# Patient Record
Sex: MALE | Race: WHITE | NOT HISPANIC OR LATINO | Employment: STUDENT | URBAN - METROPOLITAN AREA
[De-identification: names, ages, dates, MRNs, and addresses within clinical notes are randomized per-mention and may not be internally consistent; named-entity substitution may affect disease eponyms.]

---
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
## 2021-04-11 ENCOUNTER — HOSPITAL ENCOUNTER (EMERGENCY)
Facility: HOSPITAL | Age: 21
End: 2021-04-13
Attending: EMERGENCY MEDICINE | Admitting: EMERGENCY MEDICINE
Payer: COMMERCIAL

## 2021-04-11 DIAGNOSIS — F32.A DEPRESSION: Primary | ICD-10-CM

## 2021-04-11 LAB
ALBUMIN SERPL BCP-MCNC: 4.5 G/DL (ref 3.5–5)
ALP SERPL-CCNC: 91 U/L (ref 46–484)
ALT SERPL W P-5'-P-CCNC: 47 U/L (ref 12–78)
AMPHETAMINES SERPL QL SCN: POSITIVE
ANION GAP SERPL CALCULATED.3IONS-SCNC: 8 MMOL/L (ref 4–13)
AST SERPL W P-5'-P-CCNC: 22 U/L (ref 5–45)
BARBITURATES UR QL: NEGATIVE
BASOPHILS # BLD AUTO: 0.04 THOUSANDS/ΜL (ref 0–0.1)
BASOPHILS NFR BLD AUTO: 0 % (ref 0–1)
BENZODIAZ UR QL: NEGATIVE
BILIRUB SERPL-MCNC: 1.77 MG/DL (ref 0.2–1)
BILIRUB UR QL STRIP: ABNORMAL
BUN SERPL-MCNC: 18 MG/DL (ref 5–25)
CALCIUM SERPL-MCNC: 9.3 MG/DL (ref 8.3–10.1)
CHLORIDE SERPL-SCNC: 102 MMOL/L (ref 100–108)
CLARITY UR: CLEAR
CO2 SERPL-SCNC: 27 MMOL/L (ref 21–32)
COCAINE UR QL: NEGATIVE
COLOR UR: YELLOW
CREAT SERPL-MCNC: 1.05 MG/DL (ref 0.6–1.3)
EOSINOPHIL # BLD AUTO: 0.47 THOUSAND/ΜL (ref 0–0.61)
EOSINOPHIL NFR BLD AUTO: 4 % (ref 0–6)
ERYTHROCYTE [DISTWIDTH] IN BLOOD BY AUTOMATED COUNT: 11.9 % (ref 11.6–15.1)
ETHANOL SERPL-MCNC: <3 MG/DL (ref 0–3)
FLUAV RNA RESP QL NAA+PROBE: NEGATIVE
FLUBV RNA RESP QL NAA+PROBE: NEGATIVE
GFR SERPL CREATININE-BSD FRML MDRD: 102 ML/MIN/1.73SQ M
GLUCOSE SERPL-MCNC: 80 MG/DL (ref 65–140)
GLUCOSE UR STRIP-MCNC: NEGATIVE MG/DL
HCT VFR BLD AUTO: 45.5 % (ref 36.5–49.3)
HGB BLD-MCNC: 16.1 G/DL (ref 12–17)
HGB UR QL STRIP.AUTO: NEGATIVE
IMM GRANULOCYTES # BLD AUTO: 0.04 THOUSAND/UL (ref 0–0.2)
IMM GRANULOCYTES NFR BLD AUTO: 0 % (ref 0–2)
KETONES UR STRIP-MCNC: ABNORMAL MG/DL
LEUKOCYTE ESTERASE UR QL STRIP: NEGATIVE
LYMPHOCYTES # BLD AUTO: 2.69 THOUSANDS/ΜL (ref 0.6–4.47)
LYMPHOCYTES NFR BLD AUTO: 24 % (ref 14–44)
MCH RBC QN AUTO: 32 PG (ref 26.8–34.3)
MCHC RBC AUTO-ENTMCNC: 35.4 G/DL (ref 31.4–37.4)
MCV RBC AUTO: 91 FL (ref 82–98)
METHADONE UR QL: NEGATIVE
MONOCYTES # BLD AUTO: 0.74 THOUSAND/ΜL (ref 0.17–1.22)
MONOCYTES NFR BLD AUTO: 7 % (ref 4–12)
NEUTROPHILS # BLD AUTO: 7.34 THOUSANDS/ΜL (ref 1.85–7.62)
NEUTS SEG NFR BLD AUTO: 65 % (ref 43–75)
NITRITE UR QL STRIP: NEGATIVE
NRBC BLD AUTO-RTO: 0 /100 WBCS
OPIATES UR QL SCN: NEGATIVE
OXYCODONE+OXYMORPHONE UR QL SCN: NEGATIVE
PCP UR QL: NEGATIVE
PH UR STRIP.AUTO: 6 [PH]
PLATELET # BLD AUTO: 269 THOUSANDS/UL (ref 149–390)
PMV BLD AUTO: 8.9 FL (ref 8.9–12.7)
POTASSIUM SERPL-SCNC: 3.6 MMOL/L (ref 3.5–5.3)
PROT SERPL-MCNC: 7.5 G/DL (ref 6.4–8.2)
PROT UR STRIP-MCNC: NEGATIVE MG/DL
RBC # BLD AUTO: 5.03 MILLION/UL (ref 3.88–5.62)
RSV RNA RESP QL NAA+PROBE: NEGATIVE
SARS-COV-2 RNA RESP QL NAA+PROBE: NEGATIVE
SODIUM SERPL-SCNC: 137 MMOL/L (ref 136–145)
SP GR UR STRIP.AUTO: >=1.03 (ref 1–1.03)
THC UR QL: POSITIVE
UROBILINOGEN UR QL STRIP.AUTO: 0.2 E.U./DL
WBC # BLD AUTO: 11.32 THOUSAND/UL (ref 4.31–10.16)

## 2021-04-11 PROCEDURE — 85025 COMPLETE CBC W/AUTO DIFF WBC: CPT | Performed by: EMERGENCY MEDICINE

## 2021-04-11 PROCEDURE — 81003 URINALYSIS AUTO W/O SCOPE: CPT | Performed by: EMERGENCY MEDICINE

## 2021-04-11 PROCEDURE — 93005 ELECTROCARDIOGRAM TRACING: CPT

## 2021-04-11 PROCEDURE — 80053 COMPREHEN METABOLIC PANEL: CPT | Performed by: EMERGENCY MEDICINE

## 2021-04-11 PROCEDURE — 99285 EMERGENCY DEPT VISIT HI MDM: CPT | Performed by: EMERGENCY MEDICINE

## 2021-04-11 PROCEDURE — 99285 EMERGENCY DEPT VISIT HI MDM: CPT

## 2021-04-11 PROCEDURE — 82077 ASSAY SPEC XCP UR&BREATH IA: CPT | Performed by: EMERGENCY MEDICINE

## 2021-04-11 PROCEDURE — 36415 COLL VENOUS BLD VENIPUNCTURE: CPT | Performed by: EMERGENCY MEDICINE

## 2021-04-11 PROCEDURE — 0241U HB NFCT DS VIR RESP RNA 4 TRGT: CPT | Performed by: EMERGENCY MEDICINE

## 2021-04-11 PROCEDURE — 80307 DRUG TEST PRSMV CHEM ANLYZR: CPT | Performed by: EMERGENCY MEDICINE

## 2021-04-11 RX ORDER — DEXTROAMPHETAMINE SACCHARATE, AMPHETAMINE ASPARTATE MONOHYDRATE, DEXTROAMPHETAMINE SULFATE AND AMPHETAMINE SULFATE 7.5; 7.5; 7.5; 7.5 MG/1; MG/1; MG/1; MG/1
30 CAPSULE, EXTENDED RELEASE ORAL EVERY MORNING
Status: DISCONTINUED | OUTPATIENT
Start: 2021-04-12 | End: 2021-04-13 | Stop reason: HOSPADM

## 2021-04-11 RX ORDER — ARIPIPRAZOLE 2 MG/1
2 TABLET ORAL
Status: DISCONTINUED | OUTPATIENT
Start: 2021-04-11 | End: 2021-04-13 | Stop reason: HOSPADM

## 2021-04-11 RX ORDER — FLUVOXAMINE MALEATE 50 MG/1
50 TABLET, COATED ORAL
Status: DISCONTINUED | OUTPATIENT
Start: 2021-04-11 | End: 2021-04-13 | Stop reason: HOSPADM

## 2021-04-11 RX ADMIN — ARIPIPRAZOLE 2 MG: 2 TABLET ORAL at 21:49

## 2021-04-11 RX ADMIN — FLUVOXAMINE MALEATE 50 MG: 50 TABLET ORAL at 21:49
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
## 2021-04-11 NOTE — ED NOTES
Roberto Nguyen came with police  Requested to have all lab work done  Pt to be screened and telepsych      Lori Lee

## 2021-04-11 NOTE — ED NOTES
Received report from previous nurse  Patient to be medically cleared then screened by crisis or FG  Patient denied SI/HI at arrival   Patient currently in bed, no distress noted  Will continue to monitor  Continual observation maintained  a     Chico Murphy RN  04/11/21 1930

## 2021-04-12 LAB
ATRIAL RATE: 63 BPM
P AXIS: 14 DEGREES
PR INTERVAL: 136 MS
QRS AXIS: 49 DEGREES
QRSD INTERVAL: 90 MS
QT INTERVAL: 362 MS
QTC INTERVAL: 370 MS
T WAVE AXIS: 26 DEGREES
VENTRICULAR RATE: 63 BPM

## 2021-04-12 PROCEDURE — 93010 ELECTROCARDIOGRAM REPORT: CPT | Performed by: INTERNAL MEDICINE

## 2021-04-12 RX ORDER — LANOLIN ALCOHOL/MO/W.PET/CERES
3 CREAM (GRAM) TOPICAL
Status: DISCONTINUED | OUTPATIENT
Start: 2021-04-12 | End: 2021-04-13 | Stop reason: HOSPADM

## 2021-04-12 RX ADMIN — ARIPIPRAZOLE 2 MG: 2 TABLET ORAL at 23:15

## 2021-04-12 RX ADMIN — MELATONIN TAB 3 MG 3 MG: 3 TAB at 23:15

## 2021-04-12 NOTE — ED NOTES
MARGARITA faxed all the lab to [facility] except UDS  Charge nurse advised to fax UDS when available and then request for screening  PES explained the screening process to pt and he verbalized understanding       Reji Blackman

## 2021-04-12 NOTE — ED NOTES
Pt reports adderall only taken when pt needs it to focus, "like at school"    Pt reports not taking zoloft" for a long time now"     Jyothi Powers, DANY  04/12/21 3761

## 2021-04-12 NOTE — ED NOTES
Patient observed sleeping and repositioning self without difficulty  Will continue to monitor       Earl Pacheco RN  04/12/21 3243

## 2021-04-12 NOTE — ED NOTES
Patient resting comfortably  No distress noted at this time  Continual observation maintained       Chico Murphy RN  04/12/21 9701

## 2021-04-12 NOTE — ED NOTES
[place]mo covid form faxed to [facility] @ 14:30 - original placed on chart  PC; screening document and [facility] assessment all received for Epic and placed on the chart  [facility] notified PES patient was accepted @ [facility] about 15:45 by Dr Jovan Sprague; Rn erport to call [phone] [facility])  Mustapha Nuñez / Claudene Hopes called for transport about 15:50 - they will call back with a time  Called [facility] @ 20:30 - they are still working on it

## 2021-04-12 NOTE — ED NOTES
Pt noted tearful at times  Anxious, walking in room  No distress noted        Ezio Concepcion RN  04/12/21 3859

## 2021-04-12 NOTE — ED NOTES
Pt resting quietly  No distress noted  Resp easy and unlabored         Santana Baron RN  04/12/21 1958

## 2021-04-12 NOTE — ED NOTES
As per FG, patient to be tele-psyched  Will continue to monitor       Rex Alvarado, DANY  04/11/21 0029

## 2021-04-12 NOTE — ED NOTES
Pt resting quietly  No distress noted  Pharmacy cvalled for Adderall med  Pharmacy does not carry XR of med    Will notify MD Amber Salmeron, RN  04/12/21 5779

## 2021-04-12 NOTE — ED NOTES
Pt noted standing in room, talking on phone, tearful with person on phone at times         Noa Hooks, DANY  04/12/21 1300

## 2021-04-12 NOTE — EMTALA/ACUTE CARE TRANSFER
[address] [address]  Dept: [phone]      EMTALA TRANSFER CONSENT    NAME Fredis Steele                                         2000                              MRN [id number]    I have been informed of my rights regarding examination, treatment, and transfer   by Dr Josiah Frias DO    Benefits: Specialized equipment and/or services available at the receiving facility (Include comment)________________________    Risks: Potential for delay in receiving treatment, Potential deterioration of medical condition, Increased discomfort during transfer, Possible worsening of condition or death during transfer      Consent for Transfer:  I acknowledge that my medical condition has been evaluated and explained to me by the emergency department physician or other qualified medical person and/or my attending physician, who has recommended that I be transferred to the service of  Accepting Physician: Dr Nikolai Lundberg at [facility] Name, [address] : [place]  The above potential benefits of such transfer, the potential risks associated with such transfer, and the probable risks of not being transferred have been explained to me, and I fully understand them  The doctor has explained that, in my case, the benefits of transfer outweigh the risks  I agree to be transferred  I authorize the performance of emergency medical procedures and treatments upon me in both transit and upon arrival at the receiving facility  Additionally, I authorize the release of any and all medical records to the receiving facility and request they be transported with me, if possible  I understand that the safest mode of transportation during a medical emergency is an ambulance and that the Hospital advocates the use of this mode of transport   Risks of traveling to the receiving facility by car, including absence of medical control, life sustaining equipment, such as oxygen, and medical personnel has been explained to me and I fully understand them  (LISANDRA CORRECT BOX BELOW)  [  ]  I consent to the stated transfer and to be transported by ambulance/helicopter  [  ]  I consent to the stated transfer, but refuse transportation by ambulance and accept full responsibility for my transportation by car  I understand the risks of non-ambulance transfers and I exonerate the Hospital and its staff from any deterioration in my condition that results from this refusal     X___________________________________________    DATE  21  TIME________  Signature of patient or legally responsible individual signing on patient behalf           RELATIONSHIP TO PATIENT_________________________          Provider Certification    NAME Elizabeth Lima                                         2000                              MRN [id number]    A medical screening exam was performed on the above named patient  Based on the examination:    Condition Necessitating Transfer There were no encounter diagnoses      Patient Condition: The patient has been stabilized such that within reasonable medical probability, no material deterioration of the patient condition or the condition of the unborn child(sebas) is likely to result from the transfer    Reason for Transfer: Level of Care needed not available at this facility    Transfer Requirements: [address]   · Space available and qualified personnel available for treatment as acknowledged by    · Agreed to accept transfer and to provide appropriate medical treatment as acknowledged by       Dr Joaquim Pinto  · Appropriate medical records of the examination and treatment of the patient are provided at the time of transfer   [address], [address] _______  · Transfer will be performed by qualified personnel from    and appropriate transfer equipment as required, including the use of necessary and appropriate life support measures  Provider Certification: I have examined the patient and explained the following risks and benefits of being transferred/refusing transfer to the patient/family:  General risk, such as traffic hazards, adverse weather conditions, rough terrain or turbulence, possible failure of equipment (including vehicle or aircraft), or consequences of actions of persons outside the control of the transport personnel      Based on these reasonable risks and benefits to the patient and/or the unborn child(sebas), and based upon the information available at the time of the patients examination, I certify that the medical benefits reasonably to be expected from the provision of appropriate medical treatments at another medical facility outweigh the increasing risks, if any, to the individuals medical condition, and in the case of labor to the unborn child, from effecting the transfer      X____________________________________________ DATE 04/12/21        TIME_______      ORIGINAL - SEND TO MEDICAL RECORDS   COPY - SEND WITH PATIENT DURING TRANSFER

## 2021-04-12 NOTE — ED CARE HANDOFF
Emergency Department Sign Out Note        Sign out and transfer of care from Dr Izabella Solorzano  See Separate Emergency Department note  The patient, Thomas Burn, was evaluated by the previous provider for psychiatric evaluation  ED Course as of Apr 12 1849   Mon Apr 12, 2021   6500 Patient care signed out from Dr Izabella Solorzano  Clary Lee is a  24year old M who presented with HI  Patient has been medically cleared by prior provider  Is awaiting psychiatric evaluation  1711 Patient care signed out to Dr Reynold Jean  Pending placement  Procedures  MDM    Disposition  Final diagnoses:   None     ED Disposition     ED Disposition Condition Date/Time Comment    Transfer to [facility] Apr 11, 2021  6:29 PM Thomas Burn should be transferred out to [facility] and has been medically cleared  Follow-up Information    None       Patient's Medications   Discharge Prescriptions    No medications on file     No discharge procedures on file         ED Provider  Electronically Signed by     Madison Cha DO  04/12/21 9811

## 2021-04-12 NOTE — ED NOTES
Patient on hospital portable phone speaking loudly and using profanity  This nurse asked patient to keep voice down or phone would be taken away  Patient verbalized understanding  Will continue to monitor       Claudy Brennan RN  04/11/21 8213

## 2021-04-12 NOTE — ED NOTES
4/12/21 @ 0728:  [facility] reports that telepsych is set up for 0740; PES set up computer  [address] [facility], [address] [address]: Telepsych completed with Monique Hayes and psychiatrist; patient is committed  Patient didn't take news very well and became extremely anxious and agitated, asking for his mother  Patient was crying and screaming  PES will continue to monitor  MS Rona    0915: PES met with patient , who was tearful and agitated, but was easily redirected  Patient is presenting extremely anxious and desperate  He has been calling his mother, trying to "get out of this; is there any way?"  PES discussed situation with patient in an attempt to calm him, and he did calm down significantly  As soon as PES left the room, patient became increasingly anxious, pacing and deep breathing  Patient says, "I can't miss anymore school; I was out due to [place], and I'm trying to make up the work; If I miss more time, I will have to drop out and start over; this is going to make me worse; I need to go home!!"  Patient has been begging his mother to visit  PES will continue to monitor  [facility], [address]: Patient's mother came to visit and was updated  PES will continue to monitor  [facility], [address]: Patient's mother left; visit was productive and positive  PES provided positive reinforcement    [address], [place]

## 2021-04-13 VITALS
BODY MASS INDEX: 30.38 KG/M2 | RESPIRATION RATE: 18 BRPM | TEMPERATURE: 98.7 F | OXYGEN SATURATION: 97 % | HEART RATE: 76 BPM | WEIGHT: 182.32 LBS | SYSTOLIC BLOOD PRESSURE: 126 MMHG | HEIGHT: 65 IN | DIASTOLIC BLOOD PRESSURE: 61 MMHG

## 2021-04-13 RX ADMIN — FLUVOXAMINE MALEATE 50 MG: 50 TABLET ORAL at 08:44

## 2021-04-13 NOTE — ED NOTES
4/13/21 @ 24 [id number]:  PES received call from Kevin Leon at [facility]; still trying to set up transport; earliest through [facility] is 1430; PES confirmed that [facility] would be appropriate; will call back  [facility] [address]:  PES met with patient and provided phone to call his mother  PES provided positive reinforcement  While talking to patient, RN notified PES that patient was being picked up by [facility] NOW  Patient calling his mother, and PES provided RN with nurse to nurse information  MS Rona    0945: [facility] transported patient to [place]; patient was calm and cooperative; belongings went with him    [address] [place]

## 2021-04-13 NOTE — ED NOTES
Patient sleeping respirations even and unlabored, constant observation maintained     Michelle Mane RN  04/13/21 0725

## 2021-04-13 NOTE — ED NOTES
Patient resting on stretcher, no distress at this time, constant observation maintained     Zaid Rocha, DANY  04/13/21 0121

## 2023-04-25 ENCOUNTER — HOSPITAL ENCOUNTER (EMERGENCY)
Facility: HOSPITAL | Age: 23
Discharge: HOME | End: 2023-04-25
Attending: EMERGENCY MEDICINE | Admitting: EMERGENCY MEDICINE

## 2023-04-25 VITALS
DIASTOLIC BLOOD PRESSURE: 65 MMHG | OXYGEN SATURATION: 98 % | TEMPERATURE: 98.7 F | RESPIRATION RATE: 18 BRPM | HEART RATE: 68 BPM | SYSTOLIC BLOOD PRESSURE: 122 MMHG

## 2023-04-25 DIAGNOSIS — G43.909 MIGRAINE HEADACHE: Primary | ICD-10-CM

## 2023-04-25 RX ORDER — DIPHENHYDRAMINE HYDROCHLORIDE 50 MG/ML
50 INJECTION INTRAMUSCULAR; INTRAVENOUS ONCE
Status: COMPLETED | OUTPATIENT
Start: 2023-04-25 | End: 2023-04-25

## 2023-04-25 RX ORDER — METOCLOPRAMIDE HYDROCHLORIDE 5 MG/ML
10 INJECTION INTRAMUSCULAR; INTRAVENOUS ONCE
Status: COMPLETED | OUTPATIENT
Start: 2023-04-25 | End: 2023-04-25

## 2023-04-25 RX ORDER — MAGNESIUM SULFATE HEPTAHYDRATE 40 MG/ML
2 INJECTION, SOLUTION INTRAVENOUS ONCE
Status: COMPLETED | OUTPATIENT
Start: 2023-04-25 | End: 2023-04-25

## 2023-04-25 RX ORDER — IBUPROFEN 600 MG/1
600 TABLET ORAL EVERY 6 HOURS PRN
Qty: 30 TABLET | Refills: 0 | Status: SHIPPED | OUTPATIENT
Start: 2023-04-25

## 2023-04-25 RX ORDER — DEXAMETHASONE SODIUM PHOSPHATE 4 MG/ML
10 INJECTION, SOLUTION INTRA-ARTICULAR; INTRALESIONAL; INTRAMUSCULAR; INTRAVENOUS; SOFT TISSUE ONCE
Status: COMPLETED | OUTPATIENT
Start: 2023-04-25 | End: 2023-04-25

## 2023-04-25 RX ADMIN — DIPHENHYDRAMINE HYDROCHLORIDE 50 MG: 50 INJECTION, SOLUTION INTRAMUSCULAR; INTRAVENOUS at 01:35

## 2023-04-25 RX ADMIN — METOCLOPRAMIDE 10 MG: 5 INJECTION, SOLUTION INTRAMUSCULAR; INTRAVENOUS at 01:40

## 2023-04-25 RX ADMIN — DEXAMETHASONE SODIUM PHOSPHATE 10 MG: 4 INJECTION, SOLUTION INTRAMUSCULAR; INTRAVENOUS at 01:42

## 2023-04-25 RX ADMIN — MAGNESIUM SULFATE HEPTAHYDRATE 2 G: 40 INJECTION, SOLUTION INTRAVENOUS at 01:46

## 2023-04-25 RX ADMIN — SODIUM CHLORIDE 1000 ML: 0.9 INJECTION, SOLUTION INTRAVENOUS at 01:33

## 2023-04-25 NOTE — ED NOTES
Pt  Ambulated to bathroom, pt  reports feeling better, no pain at this time       Carlo Randolph RN  04/25/23 0208

## 2023-04-25 NOTE — ED PROVIDER NOTES
History  Chief Complaint   Patient presents with   • Migraine     Pt  C/o painful migraine, and lots of pressure in head  Started getting migraines since march  Took exced  at 9pm last     24 yo M with past history of migraine presents to the ED for evaluation of migraine headache  Patient states that he started to have headaches on a recurrent basis since the beginning of this year  Patient was evaluated by an outside hospital last month where he underwent CT head that did not show any acute abnormalities  Patient was treated with migraine cocktail and discharged home with migraine medication  Patient states that he cannot recall the name of the migraine medication  Patient started to have headache again last week  Patient tried taking the migraine medicine that was prescribed to him however it did not help much  Patient continued to have intermittent headache that has increased in intensity over the past day  Patient now has associated nausea however denies any vomiting  Patient came to the ED for further evaluation  Patient denies any other focal neurodeficits  Patient states that he does get intermittent blurry vision with his headaches  History provided by:  Patient  Migraine  Associated symptoms: headaches    Associated symptoms: no abdominal pain, no chest pain, no cough, no ear pain, no fever, no rash, no shortness of breath, no sore throat and no vomiting        Prior to Admission Medications   Prescriptions Last Dose Informant Patient Reported? Taking?    ARIPiprazole (ABILIFY) 2 mg tablet   Yes No   Sig: Take 2 mg by mouth daily at bedtime   acetaminophen (TYLENOL) 325 mg tablet   No No   Sig: Take 3 tablets (975 mg total) by mouth every 8 (eight) hours   amphetamine-dextroamphetamine (ADDERALL XR) 30 MG 24 hr capsule   Yes No   Sig: Take 30 mg by mouth every morning   fluvoxaMINE (LUVOX) 50 mg tablet   Yes No   Sig: Take 50 mg by mouth daily at bedtime At bedtime   gabapentin (NEURONTIN) 100 mg capsule   No No   Sig: Take 1 capsule (100 mg total) by mouth 3 (three) times a day   methocarbamol (ROBAXIN) 500 mg tablet   No No   Sig: Take 1 tablet (500 mg total) by mouth every 6 (six) hours   sertraline (ZOLOFT) 100 mg tablet   Yes No   Sig: Take 100 mg by mouth daily  Facility-Administered Medications: None       Past Medical History:   Diagnosis Date   • ADHD    • ADHD (attention deficit hyperactivity disorder)    • Anxiety    • Depression    • Depression        Past Surgical History:   Procedure Laterality Date   • ADENOIDECTOMY     • TONSILLECTOMY     • TYMPANOSTOMY TUBE PLACEMENT      removed       Family History   Problem Relation Age of Onset   • No Known Problems Mother    • No Known Problems Father      I have reviewed and agree with the history as documented  E-Cigarette/Vaping   • E-Cigarette Use Never User      E-Cigarette/Vaping Substances     Social History     Tobacco Use   • Smoking status: Never   • Smokeless tobacco: Never   Vaping Use   • Vaping Use: Never used   Substance Use Topics   • Alcohol use: Not Currently   • Drug use: Not Currently     Types: Marijuana       Review of Systems   Constitutional: Negative for chills and fever  HENT: Negative for ear pain and sore throat  Eyes: Negative for pain and visual disturbance  Respiratory: Negative for cough and shortness of breath  Cardiovascular: Negative for chest pain and palpitations  Gastrointestinal: Negative for abdominal pain and vomiting  Genitourinary: Negative for dysuria and hematuria  Musculoskeletal: Negative for arthralgias and back pain  Skin: Negative for color change and rash  Neurological: Positive for headaches  Negative for seizures and syncope  All other systems reviewed and are negative  Physical Exam  Physical Exam  Vitals and nursing note reviewed  Constitutional:       General: He is not in acute distress  Appearance: He is well-developed     HENT:      Head: Normocephalic and atraumatic  Eyes:      Conjunctiva/sclera: Conjunctivae normal    Cardiovascular:      Rate and Rhythm: Normal rate and regular rhythm  Heart sounds: No murmur heard  Pulmonary:      Effort: Pulmonary effort is normal  No respiratory distress  Breath sounds: Normal breath sounds  Abdominal:      Palpations: Abdomen is soft  Tenderness: There is no abdominal tenderness  Musculoskeletal:         General: No swelling  Cervical back: Neck supple  Skin:     General: Skin is warm and dry  Capillary Refill: Capillary refill takes less than 2 seconds  Neurological:      General: No focal deficit present  Mental Status: He is alert and oriented to person, place, and time     Psychiatric:         Mood and Affect: Mood normal          Vital Signs  ED Triage Vitals   Temperature Pulse Respirations Blood Pressure SpO2   04/25/23 0114 04/25/23 0114 04/25/23 0114 04/25/23 0118 04/25/23 0114   98 7 °F (37 1 °C) 60 20 122/65 97 %      Temp Source Heart Rate Source Patient Position - Orthostatic VS BP Location FiO2 (%)   04/25/23 0114 04/25/23 0114 04/25/23 0114 04/25/23 0114 --   Oral Monitor Sitting Left arm       Pain Score       04/25/23 0114       6           Vitals:    04/25/23 0118 04/25/23 0200 04/25/23 0215 04/25/23 0230   BP: 122/65      Pulse:  66 70 68   Patient Position - Orthostatic VS:             Visual Acuity      ED Medications  Medications   sodium chloride 0 9 % bolus 1,000 mL (0 mL Intravenous Stopped 4/25/23 0233)   dexamethasone (DECADRON) injection 10 mg (10 mg Intravenous Given 4/25/23 0142)   diphenhydrAMINE (BENADRYL) injection 50 mg (50 mg Intravenous Given 4/25/23 0135)   metoclopramide (REGLAN) injection 10 mg (10 mg Intravenous Given 4/25/23 0140)   magnesium sulfate 2 g/50 mL IVPB (premix) 2 g (0 g Intravenous Stopped 4/25/23 0216)       Diagnostic Studies  Results Reviewed     None                 No orders to display Procedures  Procedures         ED Course  ED Course as of 04/25/23 0435   Tue Apr 25, 2023   0251 Patient reexamined at bedside  Headache is resolved  Patient is requesting to go home  SBIRT 20yo+    Flowsheet Row Most Recent Value   Initial Alcohol Screen: US AUDIT-C     1  How often do you have a drink containing alcohol? 0 Filed at: 04/25/2023 0117   Audit-C Score 0 Filed at: 04/25/2023 0117   USMAN: How many times in the past year have you    Used an illegal drug or used a prescription medication for non-medical reasons? Never Filed at: 04/25/2023 0117                    Medical Decision Making  Give IV fluids, migraine cocktail and continue to monitor patient for any worsening symptoms  Patient's headache almost completely resolved with migraine cocktail  Patient no longer has blurry vision  Patient feels significantly better  At this time patient symptoms today is most consistent with exacerbation of his migraine headache  Patient is discharged home with ibuprofen as needed headache and follow-up to neurology for further evaluation and management  Close return instructions given to return to the ER for any worsening symptoms  Patient agrees with discharge plan  Patient well appearing at time of discharge  Please Note: Fluency Direct voice recognition software may have been used in the creation of this document  Wrong words or sound a like substitutions may have occurred due to the inherent limitations of the voice software  Risk  Prescription drug management            Disposition  Final diagnoses:   Migraine headache     Time reflects when diagnosis was documented in both MDM as applicable and the Disposition within this note     Time User Action Codes Description Comment    4/25/2023  3:13 AM Rajan Taylor Add [G43 [id number]] Migraine headache       ED Disposition     ED Disposition   Discharge    Condition   Stable    Date/Time   Tue Apr 25, 2023  3:13 AM    Comment   [address] discharge to home/self care  Follow-up Information     Follow up With Specialties Details Why Contact Jl Watson MD Neurology Schedule an appointment as soon as possible for a visit   [address]  [phone]            Discharge Medication List as of 4/25/2023  3:13 AM      START taking these medications    Details   ibuprofen (MOTRIN) 600 mg tablet Take 1 tablet (600 mg total) by mouth every 6 (six) hours as needed for headaches, Starting Tue 4/25/2023, Normal         CONTINUE these medications which have NOT CHANGED    Details   acetaminophen (TYLENOL) 325 mg tablet Take 3 tablets (975 mg total) by mouth every 8 (eight) hours, Starting Thu 7/16/2020, Normal      amphetamine-dextroamphetamine (ADDERALL XR) 30 MG 24 hr capsule Take 30 mg by mouth every morning, Historical Med      ARIPiprazole (ABILIFY) 2 mg tablet Take 2 mg by mouth daily at bedtime, Historical Med      fluvoxaMINE (LUVOX) 50 mg tablet Take 50 mg by mouth daily at bedtime At bedtime, Historical Med      gabapentin (NEURONTIN) 100 mg capsule Take 1 capsule (100 mg total) by mouth 3 (three) times a day, Starting Mon 6/29/2020, Normal      methocarbamol (ROBAXIN) 500 mg tablet Take 1 tablet (500 mg total) by mouth every 6 (six) hours, Starting Thu 7/16/2020, Normal      sertraline (ZOLOFT) 100 mg tablet Take 100 mg by mouth daily  , Until Discontinued, Historical Med             No discharge procedures on file      PDMP Review     None          ED Provider  Electronically Signed by           Leslie Guevara DO  04/25/23 0737

## 2023-04-25 NOTE — DISCHARGE INSTRUCTIONS
[facility]  Dermatology Progress Note      S:    +pruritus       Past Medical History   has a past medical history of Anemia, Atrial fibrillation (CMS/[facility]), Blood clot associated with vein wall inflammation, Charcot's joint, Chronic kidney disease, Congestive cardiac failure (CMS/[facility]), COPD (chronic obstructive pulmonary disease) (CMS/[facility]), Diabetes (CMS/[facility]), ESRD (end stage renal disease) on dialysis (CMS/[facility]), Gout, Heart disease, Hyperlipidemia, Hypertension, Hypothyroid, Knee pain, Long term (current) use of anticoagulants (12/7/2021), Long term (current) use of anticoagulants (12/7/2021), Migraines, Myocardial infarction (CMS/[facility]), Neuropathy, On home oxygen therapy, Open wound of left great toe, Peripheral arterial disease (CMS/[facility]), Shoulder pain, Sleep apnea, and Thyroid condition.    He has no past medical history of Difficult intubation, Failed moderate sedation during procedure, Malignant hyperthermia, Motion sickness, PONV (postoperative nausea and vomiting), or Spinal headache.    Surgical History   has a past surgical history that includes Coronary artery bypass graft (2016); AV fistula placement (Left); AV fistula repair; and Permacath (Right).     Social History   reports that he quit smoking about 7 years ago. He has never used smokeless tobacco. He reports that he does not drink alcohol and does not use drugs.    Immunizations    Most Recent Immunizations   Administered Date(s) Administered   • COVID-19 18Y+ [employer] 03/23/2021   • Dtap, Unspecified Formulation 12/01/2019   • Influenza, Unspecified Formulation 09/28/2020   • Influenza, injectable, quadrivalent, preservative-free 10/21/2021   • Influenza, seasonal, injectable, trivalent 10/25/2020   • Pneumococcal polysaccharide, adult, 23 valent 02/06/2016           Allergies  ALLERGIES:  Adhesive   (environmental) and Codeine    Medications  Medications Prior to Admission   Medication Sig Dispense Refill  Please take a list of all of your medications and discharge paperwork with you to all of your follow-up medical visits  Please take all of your medications as directed  Please call your family doctor or return to the ER if you have increased shortness of breath, chest pain, fevers, chills, nausea, vomiting, diarrhea, or any other worsening symptoms    • oxycodone (ROXICODONE) 30 MG immediate release tablet Take 1 tablet by mouth every 6 hours as needed (Knee and shoulder pain). 120 tablet 0   • pregabalin (LYRICA) 75 MG capsule Take 1 capsule by mouth daily, take 1 additional dose on dialysis days. 90 capsule 1   • topiramate (TOPAMAX) 25 MG tablet TAKE 1 TABLET BY MOUTH TWICE DAILY 60 tablet 3   • midodrine (PROAMATINE) 5 MG tablet Take 2 tablets by mouth 3 days a week. 12 tablet 0   • levothyroxine 50 MCG tablet Take Levothyroxine 50 micrograms  one pill every day except on Saturday and Sunday take 2 pills a day 36 tablet 3   • budesonide-formoterol (SYMBICORT) 160-4.5 MCG/ACT inhaler Inhale 2 puffs into the lungs two times daily. 10.2 g 3   • warfarin (COUMADIN) 5 MG tablet      • fexofenadine (ALLEGRA) 60 MG tablet TAKE 1 TABLET BY MOUTH DAILY 30 tablet 3   • sertraline (ZOLOFT) 50 MG tablet Take 1 tablet by mouth daily. 30 tablet 3   • insulin glargine (Basaglar KwikPen) 100 UNIT/ML pen-injector Inject 40 Units into the skin nightly. Prime 2 units before each dose. 45 mL 1   • OneTouch Verio test strip TEST THREE TIMES DAILY AS DIRECTED 300 strip 11   • clopidogrel (Plavix) 75 MG tablet Take 1 tablet by mouth daily. 90 tablet 3   • Insulin Lispro, 1 Unit Dial, (Admelog SoloStar) 100 UNIT/ML pen-injector Inject into the skin 3 times daily as needed for High Blood Sugar (before each meal). Prime 2 units before each dose. Sliding scale     • cinacalcet (SENSIPAR) 30 MG tablet Take 30 mg by mouth daily.     • warfarin (COUMADIN) 10 MG tablet Take 12.5 mg by mouth daily.      • ASPIRIN PO Take 81 mg by mouth daily.     • torsemide (DEMADEX) 100 MG tablet Take 100 mg by mouth 2 times daily.      • triamcinolone (ARISTOCORT) 0.1 % cream Apply topically 2 times daily. 30 g 0   • Wound Dressings ([facility] Wound/Burn Dressing) Gel as directed     • simvastatin (Zocor) 20 MG tablet Take 0.5 tablets by mouth nightly. 45 tablet 3   • Incruse Ellipta 62.5 MCG/INH  inhaler Inhale 1 puff into the lungs daily. 30 each 3   • MAGnesium-Oxide 400 (241.3 Mg) MG Tab Take 1 tablet by mouth daily.     • famotidine (PEPCID) 40 MG tablet Take 1 tablet by mouth daily. 30 tablet 3   • dexamethasone (DECADRON) 1 MG tablet Take 1 mg once at 11 pm 1 tablet 0   • hydroCORTisone (CORTIZONE) 1 % cream Apply topically 3 times daily. 85.2 g 2   • ketoconazole (NIZORAL) 2 % shampoo As needed for dandruff 120 mL 0   • allopurinol (ZYLOPRIM) 300 MG tablet TAKE 1 TABLET BY MOUTH TWICE DAILY 180 tablet 3   • Promethazine HCl 6.25 MG/5ML Solution TAKE 5ML BY MOUTH AT BEDTIME 560 mL 3   • simethicone (MYLICON) 125 MG chewable tablet Chew 1 tablet by mouth every 6 hours as needed for Flatulence. 20 tablet 0   • rOPINIRole (REQUIP) 0.5 MG tablet Take 1 tablet by mouth daily. 60 tablet 2   • naLOXone (NARCAN) 4 MG/0.1ML nasal spray Spray the content of 1 device into 1 nostril. Call 911. May repeat with 2nd device in alternate nostril if no response in 2-3 minutes. Use in case of suspected opioid overdose. 2 each 1   • B complex-vitamin C-folic acid (NEPHRO-SUN) 0.8 MG Tab Take 0.8 mg by mouth daily.     • docusate sodium (COLACE) 100 MG capsule Take 100 mg by mouth daily as needed.      • betamethasone dipropionate augmented (DIPROLENE AF) 0.05 % cream Apply 0.05 application topically 2 times daily.     • Velphoro 500 MG chewable tablet 1,000 mg by Other route 3 times daily (with meals).      • nitroGLYcerin (NITROSTAT) 0.4 MG sublingual tablet Place under the tongue every 5 minutes as needed.           Physical Exam    General: no acute distress  Head: normocephalic  Eyes: no scleral icterus  Nose: +bleeding   Neck: supple  CV: symmetrical pulses  Lungs: normal pulmonary effort  GI: soft abdomen  Extremities: no peripheral edema  Neuro: no focal deficits  Skin: + linear excoriations, pink plaques and nodules     Last Recorded Vitals    VITAL SIGNS:     Vital Last Value 24 Hour Range   Temperature 97.9 °F  (36.6 °C) (02/20/22 0844) Temp  Min: 97.9 °F (36.6 °C)  Max: 97.9 °F (36.6 °C)   Pulse 78 (02/20/22 1540) Pulse  Min: 64  Max: 88   Respiratory 16 (02/20/22 1540) Resp  Min: 16  Max: 16   Non-Invasive  Blood Pressure 103/69 (02/20/22 1540) BP  Min: 102/52  Max: 128/72   Pulse Oximetry 96 % (02/20/22 1540) SpO2  Min: 96 %  Max: 100 %     Vital Today Admitted   Weight 135.1 kg (297 lb 13.5 oz) (02/20/22 0700) Weight: (!) 139.6 kg (307 lb 12.2 oz) (02/18/22 0300)   Height N/A Height: 5' 11\" (180.3 cm) (02/18/22 0214)   Body Mass Index N/A BMI (Calculated): 42.94 (02/18/22 0300)     INTAKE/OUTPUT:      Intake/Output Summary (Last 24 hours) at 2/20/2022 1649  Last data filed at 2/20/2022 0600  Gross per 24 hour   Intake --   Output 1 ml   Net -1 ml         LABORATORY DATA:      WBC (K/mcL)   Date Value   02/20/2022 9.2     RBC (mil/mcL)   Date Value   02/20/2022 3.20 (L)     HCT (%)   Date Value   02/20/2022 32.4 (L)     HGB (g/dL)   Date Value   02/20/2022 10.1 (L)     PLT (K/mcL)   Date Value   02/20/2022 162       Assessment & Plan     Principal Problem:    Epistaxis      (R04.0) Epistaxis     64 year old male PMH A. fib on warfarin, coronary artery disease (totally occluded LAD, circumflex with 80% stenosis,) hypertension, ESRD (Monday Wednesday Friday), congestive heart failure, COPD on 4 L nasal cannula at baseline, diabetes mellitus, hyperlipidemia and hypothyroidism here with epistaxis     Skin lesions - favor LSC/prurigo nodularis - the ddx includes less likely perforating collagenosis  Triamcinolone 0.1% BID     Pruritus  Already on lyrica  gabapentin 100mg TID - can increase to 200mg TID if still bothersome     Epistaxis due to warfarin for afib/supratherapeutic INR   Primary/ENT/cardiology  Rate control  Anticoagulation     Chronic diastolic heart failure/HTN/pulm HTN/PAD/CAD s/p CABG 2016  Cardiology  Simvastatin  Midodrine  torsemide  Warfarin/plavix/aspirin pending bleeding     COPD  Ellipta   -Budesonide  formoterol      IDDM  -Lispro   -Lantus     neuropathy  -Pregabalin      #ESRD on HD  -Cinalcet 30 mg once daily  -nephrology on consult      #Depressive disorder  -Sertraline      #Hypothyroidism  -levothyroxine     #Migraines  -Topiramate     #Pituitary Adenoma  monitor     Kingsley Thoams MD

## 2023-04-26 ENCOUNTER — OFFICE VISIT (OUTPATIENT)
Dept: URGENT CARE | Facility: CLINIC | Age: 23
End: 2023-04-26

## 2023-04-26 VITALS
SYSTOLIC BLOOD PRESSURE: 115 MMHG | RESPIRATION RATE: 18 BRPM | OXYGEN SATURATION: 97 % | HEART RATE: 64 BPM | DIASTOLIC BLOOD PRESSURE: 56 MMHG | BODY MASS INDEX: 31.59 KG/M2 | WEIGHT: 189.6 LBS | HEIGHT: 65 IN | TEMPERATURE: 98.2 F

## 2023-04-26 DIAGNOSIS — R51.9 PRESSURE IN HEAD: Primary | ICD-10-CM

## 2023-04-26 NOTE — PROGRESS NOTES
"  [facility]        NAME: Christy Murray is a 25 y o  male  : 2000    MRN: [id number]  DATE: 2023  TIME: 3:22 PM    Assessment and Plan   Pressure in head [R51 9]  1  Pressure in head  Ambulatory Referral to Neurology        Discussed strict return to care precautions as well as red flag symptoms which should prompt immediate ED referral  Pt verbalized understanding and is in agreement with plan  Please follow up with your primary care provider within the next week  Please remember that your visit today was with an urgent care provider and should not replace follow up with your primary care provider for chronic medical issues or annual physicals  Patient Instructions       Follow up with PCP in 3-5 days  Proceed to  ER if symptoms worsen  Chief Complaint     Chief Complaint   Patient presents with   • Headache     2 months- head pressure- double vision, confusion, chest tightness, sob, neck and back pain, skin is irritated, constipation  OTC Excedrin helps with the headache but not the head pressure  PCP diagnosis was migraines but it has become worse          History of Present Illness       Patient is a 52-year-old male with past medical history of migraines, depression presenting with head pressure and chest tightness x 2 months  Seen in ER yesterday and PCP earlier, both think they are related to migraines  Also had full work up 2 months ago in ER when symptoms began with CXR, CT head which was reportedly all normal  He states it is different from migraines because it is \"pressure\" rather than pain and is associated with double vision, confusion, forgetfulness, dizziness  Pressure is bitemporal  Normally excedrin works for his migraines but neither this nor ibuprofen have helped with the pressure  Given migraine cocktail in ED yesterday and he stated that it took away the pain but not the pressure   Also endorses nausea, fatigue, constipation, dry itchy skin, and neck " soreness intermittently  Denies fevers or URI symptoms  Endorses intermittent difficulty urinating but no pain with urination, urgency, or frequency  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  Had neuro referral placed in ER yesterday but he did not schedule the appointment yet  Mom states that the reason they are here today because he woke up with an erythematous rash which resolved on its own promptly after developing  Does have psych history and had previously been on Abilify, Adderall, Luvox, Neurontin, and Zoloft  He is not taking any daily medications currently  Review of Systems   Review of Systems   Constitutional: Positive for fatigue  Eyes: Positive for visual disturbance  Respiratory: Positive for chest tightness  Gastrointestinal: Positive for constipation and nausea  Genitourinary: Positive for difficulty urinating  Skin: Positive for rash  Neurological: Positive for dizziness and headaches  Psychiatric/Behavioral: Positive for confusion           Current Medications       Current Outpatient Medications:   •  acetaminophen (TYLENOL) 325 mg tablet, Take 3 tablets (975 mg total) by mouth every 8 (eight) hours (Patient not taking: Reported on 4/26/2023), Disp: 60 tablet, Rfl: 0  •  amphetamine-dextroamphetamine (ADDERALL XR) 30 MG 24 hr capsule, Take 30 mg by mouth every morning (Patient not taking: Reported on 4/26/2023), Disp: , Rfl:   •  ARIPiprazole (ABILIFY) 2 mg tablet, Take 2 mg by mouth daily at bedtime (Patient not taking: Reported on 4/26/2023), Disp: , Rfl:   •  fluvoxaMINE (LUVOX) 50 mg tablet, Take 50 mg by mouth daily at bedtime At bedtime (Patient not taking: Reported on 4/26/2023), Disp: , Rfl:   •  gabapentin (NEURONTIN) 100 mg capsule, Take 1 capsule (100 mg total) by mouth 3 (three) times a day (Patient not taking: Reported on 4/26/2023), Disp: 40 capsule, Rfl: 0  •  ibuprofen (MOTRIN) 600 mg tablet, Take 1 tablet (600 mg total) by mouth every 6 (six) hours as needed for "headaches (Patient not taking: Reported on 4/26/2023), Disp: 30 tablet, Rfl: 0  •  methocarbamol (ROBAXIN) 500 mg tablet, Take 1 tablet (500 mg total) by mouth every 6 (six) hours (Patient not taking: Reported on 4/26/2023), Disp: 40 tablet, Rfl: 0  •  sertraline (ZOLOFT) 100 mg tablet, Take 100 mg by mouth daily  (Patient not taking: Reported on 4/26/2023), Disp: , Rfl:     Current Allergies     Allergies as of 04/26/2023   • (No Known Allergies)            The following portions of the patient's history were reviewed and updated as appropriate: allergies, current medications, past family history, past medical history, past social history, past surgical history and problem list      Past Medical History:   Diagnosis Date   • ADHD    • ADHD (attention deficit hyperactivity disorder)    • Anxiety    • Depression    • Depression        Past Surgical History:   Procedure Laterality Date   • ADENOIDECTOMY     • TONSILLECTOMY     • TYMPANOSTOMY TUBE PLACEMENT      removed       Family History   Problem Relation Age of Onset   • No Known Problems Mother    • No Known Problems Father          Medications have been verified  Objective   /56   Pulse 64   Temp 98 2 °F (36 8 °C)   Resp 18   Ht 5' 5\" (1 651 m)   Wt 86 kg (189 lb 9 6 oz)   SpO2 97%   BMI 31 55 kg/m²        Physical Exam     Physical Exam  Vitals and nursing note reviewed  Constitutional:       General: He is not in acute distress  Appearance: Normal appearance  He is not ill-appearing, toxic-appearing or diaphoretic  HENT:      Head: Normocephalic and atraumatic  Right Ear: Tympanic membrane, ear canal and external ear normal       Left Ear: Tympanic membrane, ear canal and external ear normal       Nose: Nose normal       Mouth/Throat:      Mouth: Mucous membranes are moist       Pharynx: Oropharynx is clear  Eyes:      Extraocular Movements: Extraocular movements intact        Conjunctiva/sclera: Conjunctivae normal       " Pupils: Pupils are equal, round, and reactive to light  Cardiovascular:      Rate and Rhythm: Normal rate and regular rhythm  Heart sounds: Normal heart sounds  Pulmonary:      Effort: Pulmonary effort is normal       Breath sounds: Normal breath sounds  Abdominal:      General: Abdomen is flat  Palpations: Abdomen is soft  Musculoskeletal:         General: Normal range of motion  Cervical back: Normal range of motion and neck supple  Skin:     General: Skin is warm and dry  Capillary Refill: Capillary refill takes less than 2 seconds  Neurological:      General: No focal deficit present  Mental Status: He is alert and oriented to person, place, and time  Mental status is at baseline  Psychiatric:         Mood and Affect: Affect is flat

## 2023-09-05 ENCOUNTER — OFFICE VISIT (OUTPATIENT)
Dept: URGENT CARE | Facility: CLINIC | Age: 23
End: 2023-09-05
Payer: COMMERCIAL

## 2023-09-05 VITALS
DIASTOLIC BLOOD PRESSURE: 64 MMHG | TEMPERATURE: 97.7 F | SYSTOLIC BLOOD PRESSURE: 102 MMHG | BODY MASS INDEX: 28 KG/M2 | HEIGHT: 64 IN | WEIGHT: 164 LBS | RESPIRATION RATE: 18 BRPM | OXYGEN SATURATION: 100 % | HEART RATE: 73 BPM

## 2023-09-05 DIAGNOSIS — J06.9 ACUTE URI: Primary | ICD-10-CM

## 2023-09-05 PROBLEM — R91.1 PULMONARY NODULE: Status: ACTIVE | Noted: 2018-07-09

## 2023-09-05 LAB
SARS-COV-2 AG UPPER RESP QL IA: NEGATIVE
VALID CONTROL: NORMAL

## 2023-09-05 PROCEDURE — 87811 SARS-COV-2 COVID19 W/OPTIC: CPT | Performed by: FAMILY MEDICINE

## 2023-09-05 PROCEDURE — 99213 OFFICE O/P EST LOW 20 MIN: CPT | Performed by: FAMILY MEDICINE

## 2023-09-05 RX ORDER — FLUTICASONE PROPIONATE 50 MCG
1 SPRAY, SUSPENSION (ML) NASAL DAILY
Qty: 9.9 ML | Refills: 0 | Status: SHIPPED | OUTPATIENT
Start: 2023-09-05

## 2023-09-05 RX ORDER — BENZONATATE 200 MG/1
200 CAPSULE ORAL 3 TIMES DAILY PRN
Qty: 20 CAPSULE | Refills: 0 | Status: SHIPPED | OUTPATIENT
Start: 2023-09-05

## 2023-09-05 NOTE — PATIENT INSTRUCTIONS
Acute viral URI (common cold)  - COVID-19 test performed in office is negative   - rest and drink plenty of fluids  - take Tylenol or Motrin as needed   - advised warm salt water gargles and throat lozenges as needed   - drink warm tea w/ lemon and honey   - run a humidifier at home   - Flonase nasal spray prescribed, use as directed   - Tessalon pearls prescribed to be taken as needed for cough  - if symptoms persist despite treatment, worsen, or any new symptoms present, should be seen by PCP for re-check.

## 2023-09-05 NOTE — PROGRESS NOTES
[facility]        NAME: Carlos Rome is a 26 y.o. male  : 2000    MRN: [id number]  DATE: 2023  TIME: 10:29 AM    Assessment and Plan   Acute URI [J06.9]  1. Acute URI  Poct Covid 19 Rapid Antigen Test    benzonatate (TESSALON) 200 MG capsule    fluticasone (FLONASE) 50 mcg/act nasal spray            Patient Instructions     Patient Instructions   Acute viral URI (common cold)  - COVID-19 test performed in office is negative   - rest and drink plenty of fluids  - take Tylenol or Motrin as needed   - advised warm salt water gargles and throat lozenges as needed   - drink warm tea w/ lemon and honey   - run a humidifier at home   - Flonase nasal spray prescribed, use as directed   - Tessalon pearls prescribed to be taken as needed for cough  - if symptoms persist despite treatment, worsen, or any new symptoms present, should be seen by PCP for re-check. Follow up with PCP in 3-5 days. Proceed to  ER if symptoms worsen. Chief Complaint     Chief Complaint   Patient presents with   • Sore Throat     Pt here ill x 2 days, pt states sore throat, congestion, cough, fever 101 on . Pt used Advil. Pt is vaxed. No Home Covid test done. History of Present Illness       27 yo male presents c/o headache, nasal congestion, rhinorrhea, post-nasal drip, sore throat, and cough. He has been ill x 3 days. He had a fever of 101 two days ago, no fever since. No chest pain, SOB, or wheezing. Non-smoker. No GI sx. No skin rashes. No loss of taste or smell. No recent travel or known exposure to anyone with COVID-19. Patient has received the COVID-19 vaccine. He has no known allergies. He has taken Advil for the symptoms. Rapid COVID-19 test performed in office is negative. Review of Systems   Review of Systems   Constitutional:        As noted in HPI   HENT:        As noted in HPI   Eyes: Negative. Respiratory:        As noted in HPI   Cardiovascular: Negative. Gastrointestinal: Negative. Musculoskeletal: Negative. Skin: Negative. Allergic/Immunologic: Negative. Neurological: Negative. Hematological: Negative. Current Medications       Current Outpatient Medications:   •  benzonatate (TESSALON) 200 MG capsule, Take 1 capsule (200 mg total) by mouth 3 (three) times a day as needed for cough, Disp: 20 capsule, Rfl: 0  •  fluticasone (FLONASE) 50 mcg/act nasal spray, 1 spray into each nostril daily, Disp: 9.9 mL, Rfl: 0    Current Allergies     Allergies as of 09/05/2023   • (No Known Allergies)            The following portions of the patient's history were reviewed and updated as appropriate: allergies, current medications, past family history, past medical history, past social history, past surgical history and problem list.     Past Medical History:   Diagnosis Date   • ADHD    • ADHD (attention deficit hyperactivity disorder)    • Anxiety    • Depression    • Depression        Past Surgical History:   Procedure Laterality Date   • ADENOIDECTOMY     • TONSILLECTOMY     • TYMPANOSTOMY TUBE PLACEMENT      removed       Family History   Problem Relation Age of Onset   • No Known Problems Mother    • Diabetes Father          Medications have been verified. Objective   /64   Pulse 73   Temp 97.7 °F (36.5 °C)   Resp 18   Ht 5' 4" (1.626 m)   Wt 74.4 kg (164 lb)   SpO2 100%   BMI 28.15 kg/m²   No LMP for male patient. Physical Exam     Physical Exam  Vitals and nursing note reviewed. Constitutional:       General: He is awake. He is not in acute distress. Appearance: Normal appearance. He is well-developed and well-groomed. He is not ill-appearing, toxic-appearing or diaphoretic. HENT:      Head: Normocephalic and atraumatic. Right Ear: Tympanic membrane, ear canal and external ear normal.      Left Ear: Tympanic membrane, ear canal and external ear normal.      Nose: Mucosal edema and congestion present. Mouth/Throat:      Lips: Pink. No lesions. Mouth: Mucous membranes are moist.      Pharynx: Uvula midline. Posterior oropharyngeal erythema present. No pharyngeal swelling, oropharyngeal exudate or uvula swelling. Tonsils: No tonsillar exudate or tonsillar abscesses. Eyes:      General: Lids are normal.      Conjunctiva/sclera: Conjunctivae normal.   Neck:      Trachea: Trachea and phonation normal.   Cardiovascular:      Rate and Rhythm: Normal rate and regular rhythm. Pulses: Normal pulses. Heart sounds: Normal heart sounds. Pulmonary:      Effort: Pulmonary effort is normal. No tachypnea, accessory muscle usage or respiratory distress. Breath sounds: Normal breath sounds and air entry. Musculoskeletal:      Cervical back: Neck supple. No edema, erythema, rigidity or tenderness. Lymphadenopathy:      Cervical: No cervical adenopathy. Skin:     General: Skin is warm and dry. Capillary Refill: Capillary refill takes less than 2 seconds. Coloration: Skin is not pale. Neurological:      Mental Status: He is alert and oriented to person, place, and time. Mental status is at baseline. Psychiatric:         Mood and Affect: Mood normal.         Behavior: Behavior normal. Behavior is cooperative. Thought Content:  Thought content normal.         Judgment: Judgment normal.

## 2023-10-10 ENCOUNTER — TELEPHONE (OUTPATIENT)
Dept: NEUROLOGY | Facility: CLINIC | Age: 23
End: 2023-10-10

## 2023-10-23 ENCOUNTER — CONSULT (OUTPATIENT)
Dept: NEUROLOGY | Facility: CLINIC | Age: 23
End: 2023-10-23
Payer: COMMERCIAL

## 2023-10-23 VITALS
HEIGHT: 64 IN | SYSTOLIC BLOOD PRESSURE: 110 MMHG | OXYGEN SATURATION: 98 % | BODY MASS INDEX: 26.8 KG/M2 | DIASTOLIC BLOOD PRESSURE: 66 MMHG | WEIGHT: 157 LBS | HEART RATE: 74 BPM | TEMPERATURE: 97.4 F

## 2023-10-23 DIAGNOSIS — R51.9 PRESSURE IN HEAD: ICD-10-CM

## 2023-10-23 DIAGNOSIS — K90.41 GLUTEN INTOLERANCE: ICD-10-CM

## 2023-10-23 DIAGNOSIS — R51.9 NEW ONSET OF HEADACHES: Primary | ICD-10-CM

## 2023-10-23 PROCEDURE — 99205 OFFICE O/P NEW HI 60 MIN: CPT | Performed by: PSYCHIATRY & NEUROLOGY

## 2023-10-23 RX ORDER — MAGNESIUM OXIDE 400 MG/1
400 TABLET ORAL DAILY
Qty: 30 TABLET | Refills: 3 | Status: SHIPPED | OUTPATIENT
Start: 2023-10-23

## 2023-10-23 NOTE — PROGRESS NOTES
Outpatient Neurology History and Physical  Carlos Rome  [id number]  26 y.o.  2000          Consult: Yes    Lizeth Mckeon MD      Chief Complaint   Patient presents with   • Pressure in head           History Obtained from: Patient    HPI:     Carlos Rome is a 25 yo M that presents to discuss his headaches. He states that sometime 1.5 years ago, he started getting headaches. It starts off as odd feeling in head, then intense pressure, disoriented, difficulty focusing, sharp pain in head. Location is mainly left parietal region and left occipital region. Denies associated nausea, vomiting. Bright lights can affect them. He doesn't think noise worsens his headache. He reports associated brain fog, blurred vision during period of headache. He can't focus for 2-3 hours. At times it can last until he can go to bed. He describes pulsating and sharp type of pain. Lately, his headache is almost daily. He thinks that high carb food can trigger them. Denies any h/o head trauma. Denies any recreation drug use. He drinks alcohol only socially. He's had normal CT brain. He was adopted so doesn't know. His sister who is twin used to get migraines. He works at 2 separate liquor stores. Past Medical History:   Diagnosis Date   • ADHD    • ADHD (attention deficit hyperactivity disorder)    • Anxiety    • Anxiety    • Depression    • Depression                Current Outpatient Medications on File Prior to Visit   Medication Sig Dispense Refill   • benzonatate (TESSALON) 200 MG capsule Take 1 capsule (200 mg total) by mouth 3 (three) times a day as needed for cough (Patient not taking: Reported on 10/23/2023) 20 capsule 0   • fluticasone (FLONASE) 50 mcg/act nasal spray 1 spray into each nostril daily (Patient not taking: Reported on 10/23/2023) 9.9 mL 0     No current facility-administered medications on file prior to visit.        No Known Allergies      Family History   Problem Relation Age of Onset • No Known Problems Mother    • Diabetes Father                 Past Surgical History:   Procedure Laterality Date   • ADENOIDECTOMY     • TONSILLECTOMY     • TYMPANOSTOMY TUBE PLACEMENT      removed           Social History     Socioeconomic History   • Marital status: Single     Spouse name: Not on file   • Number of children: Not on file   • Years of education: Not on file   • Highest education level: Not on file   Occupational History   • Not on file   Tobacco Use   • Smoking status: Never   • Smokeless tobacco: Never   Vaping Use   • Vaping Use: Never used   Substance and Sexual Activity   • Alcohol use:  Yes   • Drug use: Not Currently     Types: Marijuana   • Sexual activity: Not on file   Other Topics Concern   • Not on file   Social History Narrative    ** Merged History Encounter **          Social Determinants of Health     Financial Resource Strain: Not on file   Food Insecurity: Not on file   Transportation Needs: Not on file   Physical Activity: Not on file   Stress: Not on file   Social Connections: Not on file   Intimate Partner Violence: Not on file   Housing Stability: Not on file       Review of Systems  Refer to positive review of systems in HPI  Constitutional- No fever  Eyes- No visual change  ENT- Hearing normal  CV- No chest pain  Resp- No Shortness of breath  GI- No diarrhea  - Bladder normal  MS- No Arthritis   Skin- No rash  Psych- No depression  Endo- No DM  Heme- No nodes    PHYSICAL EXAM:    Vitals:    10/23/23 1320   BP: 110/66   BP Location: Left arm   Patient Position: Sitting   Cuff Size: Standard   Pulse: 74   Temp: (!) 97.4 °F (36.3 °C)   TempSrc: Tympanic   SpO2: 98%   Weight: 71.2 kg (157 lb)   Height: 5' 4" (1.626 m)         Appearance: No Acute Distress  Ophthalmoscopic: Disc Flat, Normal fundus  Carotid/Heart/Peripheral Vascular: No Bruits, RRR  Orientation: Awake, Alert, and Oriented x 3  Mental status:  Memory: Registation 3/3 Recall 3/3  Attention: Normal  Knowledge: Appropriate  Language: No aphasia  Speech: No dysarthria  Cranial Nerves:  2 No Visual Defect on Confrontation; Pupils round, equal, reactive to light  3,4,6 Extraocular Movements Intact; no nystagmus  5 Facial Sensation Intact  7 No facial asymmetry  8 Intact hearing  9,10 Palate symmetric, normal gag  11 Good shoulder shrug  12 Tongue Midline  Gait: Stable, No ataxia, can perform tandem walking  Coordination: No ataxia with finger to nose testing and heel to shin testing  Sensory: Intact, Symmetric to Pinprick, Light Touch, Vibration, and Joint Position  Muscle Tone: Normal  Muscle exam  Arm Right Left Leg Right Left   Deltoid 5/5 5/5 Iliopsoas 5/5 5/5   Biceps 5/5 5/5 Quads 5/5 5/5   Triceps 5/5 5/5 Hamstrings 5/5 5/5   Wrist Extension 5/5 5/5 Ankle Dorsi Flexion 5/5 5/5   Wrist Flexion 5/5 5/5 Ankle Plantar Flexion 5/5 5/5   Interossei 5/5 5/5 Ankle Eversion 5/5 5/5   APB 5/5 5/5 Ankle Inversion 5/5 5/5       Reflexes   RJ BJ TJ KJ AJ Plantars Wheeler's   Right 2+ 2+ 2+ 2+  Downgoing Not present   Left 2+ 2+ 2+ 2+  Downgoing Not present           Personal review of         Ct brain:   Negative    Assessment/Plan:     1. New onset of headaches  MRI brain with and without contrast    magnesium oxide (MAG-OX) 400 mg tablet      2. Pressure in head  Ambulatory Referral to Neurology    magnesium oxide (MAG-OX) 400 mg tablet      3. Gluten intolerance  Gluten Sensitivity Screen    Gluten Sensitivity Screen        Patient has been getting headaches for past 1.5 years. He will need one time MRI brain to r/o any underlying structural etiology. Asked him to avoid gluten containing diet to and keep a diary to see if it makes a difference. Will screen for it as well. Will place him on mag ox for prevention. If no improvement in next 2 months, then we will place him on a prescription preventive agent.                  Counseling Documentation:  The patient and/or patient's family were  counseled regarding diagnostic results. Instructions for management,risk factor reductions,prognosis of disease were discussed. Patient and family were educated regarding impressions,risks and benefits of treatment options,importance of compliance with treatment. Total time of encounter: 60 min   More than 50% of time was spent in counseling and coordination of care of patient. Rafita Gabriel M.D.   [facility]  [address] [place] [address]

## 2023-10-23 NOTE — LETTER
October 23, 2023     AY[place] [id number]    Patient: Eder Oneill   YOB: 2000   Date of Visit: 10/23/2023       Dear Dr. Yary Sen: Thank you for referring Gurinder Rojas to me for evaluation. Below are my notes for this consultation. If you have questions, please do not hesitate to call me. I look forward to following your patient along with you. Sincerely,        Carlyle Noel MD        CC: No Recipients    Carlyle Noel MD  10/23/2023  2:06 PM  Incomplete  Outpatient Neurology History and Physical  Eder Oneill  [id number]  26 y.o.  2000          Consult: Yes    Jericho Berman MD      Chief Complaint   Patient presents with   • Pressure in head           History Obtained from: Patient    HPI:     Eder Oneill is a 27 yo M that presents to discuss his headaches. He states that sometime 1.5 years ago, he started getting headaches. It starts off as odd feeling in head, then intense pressure, disoriented, difficulty focusing, sharp pain in head. Location is mainly left parietal region and left occipital region. Denies associated nausea, vomiting. Bright lights can affect them. He doesn't think noise worsens his headache. He reports associated brain fog, blurred vision during period of headache. He can't focus for 2-3 hours. At times it can last until he can go to bed. He describes pulsating and sharp type of pain. Lately, his headache is almost daily. He thinks that high carb food can trigger them. Denies any h/o head trauma. Denies any recreation drug use. He drinks alcohol only socially. He's had normal CT brain. He was adopted so doesn't know. His sister who is twin used to get migraines. He works at 2 separate liquor stores.        Past Medical History:   Diagnosis Date   • ADHD    • ADHD (attention deficit hyperactivity disorder)    • Anxiety    • Anxiety    • Depression    • Depression                Current Outpatient Medications on File Prior to Visit   Medication Sig Dispense Refill   • benzonatate (TESSALON) 200 MG capsule Take 1 capsule (200 mg total) by mouth 3 (three) times a day as needed for cough (Patient not taking: Reported on 10/23/2023) 20 capsule 0   • fluticasone (FLONASE) 50 mcg/act nasal spray 1 spray into each nostril daily (Patient not taking: Reported on 10/23/2023) 9.9 mL 0     No current facility-administered medications on file prior to visit. No Known Allergies      Family History   Problem Relation Age of Onset   • No Known Problems Mother    • Diabetes Father                 Past Surgical History:   Procedure Laterality Date   • ADENOIDECTOMY     • TONSILLECTOMY     • TYMPANOSTOMY TUBE PLACEMENT      removed           Social History     Socioeconomic History   • Marital status: Single     Spouse name: Not on file   • Number of children: Not on file   • Years of education: Not on file   • Highest education level: Not on file   Occupational History   • Not on file   Tobacco Use   • Smoking status: Never   • Smokeless tobacco: Never   Vaping Use   • Vaping Use: Never used   Substance and Sexual Activity   • Alcohol use:  Yes   • Drug use: Not Currently     Types: Marijuana   • Sexual activity: Not on file   Other Topics Concern   • Not on file   Social History Narrative    ** Merged History Encounter **          Social Determinants of Health     Financial Resource Strain: Not on file   Food Insecurity: Not on file   Transportation Needs: Not on file   Physical Activity: Not on file   Stress: Not on file   Social Connections: Not on file   Intimate Partner Violence: Not on file   Housing Stability: Not on file       Review of Systems  Refer to positive review of systems in HPI  Constitutional- No fever  Eyes- No visual change  ENT- Hearing normal  CV- No chest pain  Resp- No Shortness of breath  GI- No diarrhea  - Bladder normal  MS- No Arthritis   Skin- No rash  Psych- No depression  Endo- No DM  Heme- No nodes    PHYSICAL EXAM:    Vitals:    10/23/23 1320   BP: 110/66   BP Location: Left arm   Patient Position: Sitting   Cuff Size: Standard   Pulse: 74   Temp: (!) 97.4 °F (36.3 °C)   TempSrc: Tympanic   SpO2: 98%   Weight: 71.2 kg (157 lb)   Height: 5' 4" (1.626 m)         Appearance: No Acute Distress  Ophthalmoscopic: Disc Flat, Normal fundus  Carotid/Heart/Peripheral Vascular: No Bruits, RRR  Orientation: Awake, Alert, and Oriented x 3  Mental status:  Memory: Registation 3/3 Recall 3/3  Attention: Normal  Knowledge: Appropriate  Language: No aphasia  Speech: No dysarthria  Cranial Nerves:  2 No Visual Defect on Confrontation; Pupils round, equal, reactive to light  3,4,6 Extraocular Movements Intact; no nystagmus  5 Facial Sensation Intact  7 No facial asymmetry  8 Intact hearing  9,10 Palate symmetric, normal gag  11 Good shoulder shrug  12 Tongue Midline  Gait: Stable, No ataxia, can perform tandem walking  Coordination: No ataxia with finger to nose testing and heel to shin testing  Sensory: Intact, Symmetric to Pinprick, Light Touch, Vibration, and Joint Position  Muscle Tone: Normal  Muscle exam  Arm Right Left Leg Right Left   Deltoid 5/5 5/5 Iliopsoas 5/5 5/5   Biceps 5/5 5/5 Quads 5/5 5/5   Triceps 5/5 5/5 Hamstrings 5/5 5/5   Wrist Extension 5/5 5/5 Ankle Dorsi Flexion 5/5 5/5   Wrist Flexion 5/5 5/5 Ankle Plantar Flexion 5/5 5/5   Interossei 5/5 5/5 Ankle Eversion 5/5 5/5   APB 5/5 5/5 Ankle Inversion 5/5 5/5       Reflexes   RJ BJ TJ KJ AJ Plantars Wheeler's   Right 2+ 2+ 2+ 2+  Downgoing Not present   Left 2+ 2+ 2+ 2+  Downgoing Not present           Personal review of         Ct brain:   Negative    Assessment/Plan:     1. New onset of headaches  MRI brain with and without contrast    magnesium oxide (MAG-OX) 400 mg tablet      2. Pressure in head  Ambulatory Referral to Neurology    magnesium oxide (MAG-OX) 400 mg tablet      3.  Gluten intolerance  Gluten Sensitivity Screen Gluten Sensitivity Screen        Patient has been getting headaches for past 1.5 years. He will need one time MRI brain to r/o any underlying structural etiology. Asked him to avoid gluten containing diet to and keep a diary to see if it makes a difference. Will screen for it as well. Will place him on mag ox for prevention. If no improvement in next 2 months, then we will place him on a prescription preventive agent. Counseling Documentation:  The patient and/or patient's family were  counseled regarding diagnostic results. Instructions for management,risk factor reductions,prognosis of disease were discussed. Patient and family were educated regarding impressions,risks and benefits of treatment options,importance of compliance with treatment. Total time of encounter: 60 min   More than 50% of time was spent in counseling and coordination of care of patient. Saad Woodard M.D.   [facility]  [address]

## 2024-02-07 ENCOUNTER — HOSPITAL ENCOUNTER (OUTPATIENT)
Dept: RADIOLOGY | Facility: HOSPITAL | Age: 24
Discharge: HOME | End: 2024-02-07
Attending: PSYCHIATRY & NEUROLOGY
Payer: COMMERCIAL

## 2024-02-07 DIAGNOSIS — R51.9 NEW ONSET OF HEADACHES: ICD-10-CM

## 2024-02-07 PROCEDURE — G1004 CDSM NDSC: HCPCS

## 2024-02-07 PROCEDURE — A9585 GADOBUTROL INJECTION: HCPCS | Performed by: PSYCHIATRY & NEUROLOGY

## 2024-02-07 PROCEDURE — 70553 MRI BRAIN STEM W/O & W/DYE: CPT

## 2024-02-07 RX ORDER — GADOBUTROL 604.72 MG/ML
7 INJECTION INTRAVENOUS
Status: COMPLETED | OUTPATIENT
Start: 2024-02-07 | End: 2024-02-07

## 2024-02-07 RX ADMIN — GADOBUTROL 7 ML: 604.72 INJECTION INTRAVENOUS at 16:51
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
## 2024-02-26 ENCOUNTER — TELEPHONE (OUTPATIENT)
Dept: NEUROLOGY | Facility: CLINIC | Age: 24
End: 2024-02-26

## 2024-03-08 ENCOUNTER — TELEPHONE (OUTPATIENT)
Dept: NEUROLOGY | Facility: CLINIC | Age: 24
End: 2024-03-08

## 2024-03-11 ENCOUNTER — OFFICE VISIT (OUTPATIENT)
Dept: NEUROLOGY | Facility: CLINIC | Age: 24
End: 2024-03-11
Payer: COMMERCIAL

## 2024-03-11 VITALS
HEART RATE: 80 BPM | SYSTOLIC BLOOD PRESSURE: 106 MMHG | OXYGEN SATURATION: 98 % | DIASTOLIC BLOOD PRESSURE: 72 MMHG | HEIGHT: 65 IN | TEMPERATURE: 97.1 F | WEIGHT: 154 LBS | BODY MASS INDEX: 25.66 KG/M2

## 2024-03-11 DIAGNOSIS — R51.9 EPISODIC HEADACHE: Primary | ICD-10-CM

## 2024-03-11 DIAGNOSIS — K90.0 TRANSIENT GLUTEN SENSITIVITY: ICD-10-CM

## 2024-03-11 PROCEDURE — 99214 OFFICE O/P EST MOD 30 MIN: CPT | Performed by: PSYCHIATRY & NEUROLOGY

## 2024-03-11 NOTE — PROGRESS NOTES
Return NeuroOutpatient Note        Jay Ortiz  [id number]  23 y.o.  2000       Gluten intolerance , Pressure in head , and New onset of headaches         History obtained from:  Patient     HPI/Subjective:    Jay Ortiz is a 24 yo M with PMH of headaches presents as f/u. Per my previous history, he had reported headaches for past 1.5 yrs. It would start off  as odd feeling in head, then intense pressure, disoriented, difficulty focusing, sharp pain in head. Location is mainly left parietal region and left occipital region. Denies associated nausea, vomiting. Bright lights can affect them. He doesn't think noise worsens his headache. He reports associated brain fog, blurred vision during period of headache. He can't focus for 2-3 hours.   We had placed him on mag supplements. His MRI brain ordered was normal.   There was concern for gluten sensitivity and he has been avoiding it per instruction and now he only reports 2-3 headaches a month.   He says excedrin works as abortive well.     He was adopted so doesn't know. His sister who is twin used to get migraines.   He works at 2 separate liquor stores.       Past Medical History:   Diagnosis Date   • ADHD    • ADHD (attention deficit hyperactivity disorder)    • Anxiety    • Anxiety    • Depression    • Depression      Social History     Socioeconomic History   • Marital status: Single     Spouse name: Not on file   • Number of children: Not on file   • Years of education: Not on file   • Highest education level: Not on file   Occupational History   • Not on file   Tobacco Use   • Smoking status: Never   • Smokeless tobacco: Never   Vaping Use   • Vaping status: Never Used   Substance and Sexual Activity   • Alcohol use: Yes   • Drug use: Not Currently     Types: Marijuana   • Sexual activity: Not on file   Other Topics Concern   • Not on file   Social History Narrative    ** Merged History Encounter **          Social Determinants of Health  "    Financial Resource Strain: Not on file   Food Insecurity: Not on file   Transportation Needs: Not on file   Physical Activity: Not on file   Stress: Not on file   Social Connections: Not on file   Intimate Partner Violence: Not on file   Housing Stability: Not on file     Family History   Problem Relation Age of Onset   • No Known Problems Mother    • Diabetes Father      No Known Allergies  Current Outpatient Medications on File Prior to Visit   Medication Sig Dispense Refill   • magnesium oxide (MAG-OX) 400 mg tablet Take 1 tablet (400 mg total) by mouth daily 30 tablet 3   • benzonatate (TESSALON) 200 MG capsule Take 1 capsule (200 mg total) by mouth 3 (three) times a day as needed for cough (Patient not taking: Reported on 10/23/2023) 20 capsule 0   • fluticasone (FLONASE) 50 mcg/act nasal spray 1 spray into each nostril daily (Patient not taking: Reported on 10/23/2023) 9.9 mL 0     No current facility-administered medications on file prior to visit.         Review of Systems   Refer to positive review of systems in HPI.   Review of Systems    Constitutional- No fever  Eyes- No visual change  ENT- Hearing normal  CV- No chest pain  Resp- No Shortness of breath  GI- No diarrhea  - Bladder normal  MS- No Arthritis   Skin- No rash  Psych- No depression  Endo- No DM  Heme- No nodes    Vitals:    03/11/24 1017   BP: 106/72   BP Location: Left arm   Patient Position: Sitting   Cuff Size: Standard   Pulse: 80   Temp: (!) 97.1 °F (36.2 °C)   TempSrc: Tympanic   SpO2: 98%   Weight: 69.9 kg (154 lb)   Height: 5' 5\" (1.651 m)       PHYSICAL EXAM:  Appearance: No Acute Distress  Ophthalmoscopic: Disc Flat, Normal fundus  Mental status:  Orientation: Awake, Alert, and Orientedx3  Memory: Registation 3/3 Recall 3/3  Attention: normal  Knowledge: good  Language: No aphasia  Speech: No dysarthria  Cranial Nerves:  2 No Visual Defect on Confrontation, Pupils round, equal, reactive to light  3,4,6 Extraocular Movements " Intact, no nystagmus  5 Facial Sensation Intact  7 No facial asymmetry  8 Intact hearing  9,10 Palate symmetric, normal gag  11 Good shoulder shrug  12 Tongue Midline  Gait: Stable  Coordination: No ataxia with finger to nose testing, and heel to shin  Sensory: Intact, Symmetric to pinprick, light touch, vibration, and joint position  Muscle Tone: Normal              Muscle exam:  Arm Right Left Leg Right Left   Deltoid 5/5 5/5 Iliopsoas 5/5 5/5   Biceps 5/5 5/5 Quads 5/5 5/5   Triceps 5/5 5/5 Hamstrings 5/5 5/5   Wrist Extension 5/5 5/5 Ankle Dorsi Flexion 5/5 5/5   Wrist Flexion 5/5 5/5 Ankle Plantar Flexion 5/5 5/5   Interossei 5/5 5/5 Ankle Eversion 5/5 5/5   APB 5/5 5/5 Ankle Inversion 5/5 5/5       Reflexes   RJ BJ TJ KJ AJ Plantars Wheeler's   Right 2+ 2+ 2+ 2+ 2+ Downgoing Not present   Left 2+ 2+ 2+ 2+ 2+ Downgoing Not present     Personal review of  Labs:                  Diagnoses and all orders for this visit:      1. Episodic headache        2. Transient gluten sensitivity  Gluten Sensitivity Screen    Gluten IgE          Patient has been getting better since last visit. Now he only gets episodic pressure type headaches 2-3x/month for which he take excedrin.   Will continue mag supplements.   He will resume avoiding gluten containing food. He hasn't gone for lab testing yet.               Total time of encounter:  30 min  More than 50% of the time was used in counseling and/or coordination of care  Extent of counseling and/or coordination of care        Hemarcia Pittman MD  [facility]  [address] [place] [id number] [phone]

## 2024-06-07 ENCOUNTER — OFFICE VISIT (OUTPATIENT)
Dept: FAMILY MEDICINE CLINIC | Facility: CLINIC | Age: 24
End: 2024-06-07
Payer: COMMERCIAL

## 2024-06-07 VITALS
HEIGHT: 66 IN | WEIGHT: 146 LBS | HEART RATE: 84 BPM | RESPIRATION RATE: 16 BRPM | BODY MASS INDEX: 23.46 KG/M2 | DIASTOLIC BLOOD PRESSURE: 80 MMHG | SYSTOLIC BLOOD PRESSURE: 102 MMHG | TEMPERATURE: 98.4 F | OXYGEN SATURATION: 97 %

## 2024-06-07 DIAGNOSIS — Z13.29 THYROID DISORDER SCREENING: ICD-10-CM

## 2024-06-07 DIAGNOSIS — R29.898 UPPER EXTREMITY WEAKNESS: ICD-10-CM

## 2024-06-07 DIAGNOSIS — Z13.1 DIABETES MELLITUS SCREENING: ICD-10-CM

## 2024-06-07 DIAGNOSIS — Z13.0 SCREENING, IRON DEFICIENCY ANEMIA: ICD-10-CM

## 2024-06-07 DIAGNOSIS — K59.00 CONSTIPATION, UNSPECIFIED CONSTIPATION TYPE: Primary | ICD-10-CM

## 2024-06-07 DIAGNOSIS — Z13.220 SCREENING CHOLESTEROL LEVEL: ICD-10-CM

## 2024-06-07 DIAGNOSIS — K58.1 IRRITABLE BOWEL SYNDROME WITH CONSTIPATION: ICD-10-CM

## 2024-06-07 DIAGNOSIS — R51.9 NONINTRACTABLE EPISODIC HEADACHE, UNSPECIFIED HEADACHE TYPE: ICD-10-CM

## 2024-06-07 DIAGNOSIS — J93.9 PNEUMOTHORAX ON RIGHT: ICD-10-CM

## 2024-06-07 DIAGNOSIS — E87.8 ELECTROLYTE ABNORMALITY: ICD-10-CM

## 2024-06-07 PROBLEM — R91.1 PULMONARY NODULE: Status: RESOLVED | Noted: 2018-07-09 | Resolved: 2024-06-07

## 2024-06-07 PROBLEM — S27.321A RIGHT PULMONARY CONTUSION: Status: RESOLVED | Noted: 2020-06-29 | Resolved: 2024-06-07

## 2024-06-07 PROCEDURE — 99204 OFFICE O/P NEW MOD 45 MIN: CPT | Performed by: STUDENT IN AN ORGANIZED HEALTH CARE EDUCATION/TRAINING PROGRAM

## 2024-06-07 RX ORDER — POLYETHYLENE GLYCOL 3350 17 G/17G
17 POWDER, FOR SOLUTION ORAL 2 TIMES DAILY
Qty: 100 EACH | Refills: 1 | Status: SHIPPED | OUTPATIENT
Start: 2024-06-07

## 2024-06-07 NOTE — PROGRESS NOTES
Clinic Visit Note  Jay Ortiz 23 y.o. male   MRN: [id number]    Assessment and Plan      Diagnoses and all orders for this visit:    Irritable bowel syndrome with constipation  Constipation, unspecified constipation type  Constipation symptoms, concern for IBS, recommend MiraLAX twice daily for appropriate bowel regimen, follow-up with gastroenterology, likely will benefit from endoscopic evaluation.  -     polyethylene glycol (MIRALAX) 17 g packet; Take 17 g by mouth 2 (two) times a day  -     Ambulatory Referral to Gastroenterology; Future    Pneumothorax on right  Lungs clear to auscultation bilaterally, history of small pneumothorax reviewed on imaging    Nonintractable episodic headache, unspecified headache type  Tension headache component, continue stretching/strengthening exercises, heat to area as tolerated, reviewed previous MRI unremarkable with neurology    Thyroid disorder screening  -     TSH, 3rd generation with Free T4 reflex; Future  -     TSH, 3rd generation with Free T4 reflex    Screening cholesterol level  -     Lipid panel; Future  -     Lipid panel    Electrolyte abnormality  -     Comprehensive metabolic panel; Future  -     Comprehensive metabolic panel    Diabetes mellitus screening  -     Hemoglobin A1C; Future  -     Hemoglobin A1C    Screening, iron deficiency anemia  -     CBC and differential; Future  -     CBC and differential    Upper extremity weakness  Upper extremity weakness especially with full abduction, concern for thoracic outlet syndrome, patient notes of facial redness with upper extremity elevation, follow-up imaging thoracic outlet arterial duplex assessment for next visit.  -      VAS THORACIC OUTLET ARTERIAL DUPLEX ASSESSMENT; Future    Follow-up in 3 weeks recommended.  Annual physical.    My impressions and treatment recommendations were discussed in detail with the patient who verbalized understanding and had no further questions.  Discharge instructions were  provided.    Subjective     Chief Complaint: ACV    History of Present Illness:    Patient is a pleasant 23-year-old male coming in for new patient evaluation, medications reviewed, medical history reviewed, please see assessment/plan above for further evaluation.    The following portions of the patient's history were reviewed and updated as appropriate: allergies, current medications, past family history, past medical history, past social history, past surgical history and problem list.    REVIEW OF SYSTEMS:  A complete 12-point review of systems is negative other than that noted in the HPI.    Review of Systems   Constitutional:  Negative for chills, fatigue and fever.   HENT:  Negative for congestion and sore throat.    Eyes:  Negative for pain and visual disturbance.   Respiratory:  Negative for shortness of breath and wheezing.    Cardiovascular:  Negative for chest pain and palpitations.   Gastrointestinal:  Positive for constipation. Negative for abdominal pain, diarrhea, nausea and vomiting.   Genitourinary:  Negative for dysuria and frequency.   Musculoskeletal:  Negative for back pain and neck pain.   Skin:  Negative for color change and rash.   Neurological:  Negative for dizziness and headaches.   Psychiatric/Behavioral:  Negative for agitation and confusion.    All other systems reviewed and are negative.        Current Outpatient Medications:   •  polyethylene glycol (MIRALAX) 17 g packet, Take 17 g by mouth 2 (two) times a day, Disp: 100 each, Rfl: 1  Past Medical History:   Diagnosis Date   • ADHD    • ADHD (attention deficit hyperactivity disorder)    • Anxiety    • Anxiety    • Depression    • Depression      Past Surgical History:   Procedure Laterality Date   • ADENOIDECTOMY     • TONSILLECTOMY     • TYMPANOSTOMY TUBE PLACEMENT      removed     Social History     Socioeconomic History   • Marital status: Single     Spouse name: Not on file   • Number of children: Not on file   • Years of  "education: Not on file   • Highest education level: Not on file   Occupational History   • Not on file   Tobacco Use   • Smoking status: Never   • Smokeless tobacco: Never   Vaping Use   • Vaping status: Never Used   Substance and Sexual Activity   • Alcohol use: Yes   • Drug use: Not Currently     Types: Marijuana   • Sexual activity: Not on file   Other Topics Concern   • Not on file   Social History Narrative    ** Merged History Encounter **          Social Determinants of Health     Financial Resource Strain: Not on file   Food Insecurity: Not on file   Transportation Needs: Not on file   Physical Activity: Not on file   Stress: Not on file   Social Connections: Not on file   Intimate Partner Violence: Not on file   Housing Stability: Not on file     Family History   Problem Relation Age of Onset   • No Known Problems Mother    • Diabetes Father      No Known Allergies    Objective     Vitals:    06/07/24 1005   BP: 102/80   BP Location: Left arm   Patient Position: Sitting   Cuff Size: Standard   Pulse: 84   Resp: 16   Temp: 98.4 °F (36.9 °C)   SpO2: 97%   Weight: 66.2 kg (146 lb)   Height: 5' 5.5\" (1.664 m)       Physical Exam:     GENERAL: NAD, pleasant   HEENT:  NC/AT, PERRL, EOMI, no scleral icterus  CARDIAC:  RRR, +S1/S2, no S3/S4 appreciated, no m/g/r  PULMONARY:  CTA B/L, no wheezing/rales/rhonci, non-labored breathing  ABDOMEN:  Soft, NT/ND, no rebound/guarding/rigidity  Extremities:. No edema, cyanosis, or clubbing  Musculoskeletal:  Full range of motion intact in all extremities   NEUROLOGIC: Grossly intact, no focal deficits  SKIN:  No rashes or erythema noted on exposed skin  Psych: Normal affect, mood stable    ==  PLEASE NOTE:  This encounter was completed utilizing the [employer] Direct Speech Voice Recognition Software. Grammatical errors, random word insertions, pronoun errors and incomplete sentences are occasional consequences of the system due to software limitations, ambient noise " and hardware issues.These may be missed by proof reading prior to affixing electronic signature. Any questions or concerns about the content, text or information contained within the body of this dictation should be directly addressed to the physician for clarification. Please do not hesitate to call me directly if you have any any questions or concerns.    Low Zaidi,   [facility]   [facility]

## 2024-06-24 ENCOUNTER — OFFICE VISIT (OUTPATIENT)
Dept: FAMILY MEDICINE CLINIC | Facility: CLINIC | Age: 24
End: 2024-06-24
Payer: COMMERCIAL

## 2024-06-24 VITALS
DIASTOLIC BLOOD PRESSURE: 70 MMHG | OXYGEN SATURATION: 98 % | SYSTOLIC BLOOD PRESSURE: 120 MMHG | RESPIRATION RATE: 14 BRPM | HEART RATE: 100 BPM | BODY MASS INDEX: 22.82 KG/M2 | WEIGHT: 142 LBS | HEIGHT: 66 IN | TEMPERATURE: 98.7 F

## 2024-06-24 DIAGNOSIS — J93.9 PNEUMOTHORAX ON RIGHT: ICD-10-CM

## 2024-06-24 DIAGNOSIS — Z00.00 ANNUAL PHYSICAL EXAM: Primary | ICD-10-CM

## 2024-06-24 DIAGNOSIS — R29.898 UPPER EXTREMITY WEAKNESS: ICD-10-CM

## 2024-06-24 DIAGNOSIS — G44.219 EPISODIC TENSION-TYPE HEADACHE, NOT INTRACTABLE: ICD-10-CM

## 2024-06-24 DIAGNOSIS — K58.1 IRRITABLE BOWEL SYNDROME WITH CONSTIPATION: ICD-10-CM

## 2024-06-24 PROBLEM — K59.00 CONSTIPATION: Status: RESOLVED | Noted: 2024-06-07 | Resolved: 2024-06-24

## 2024-06-24 PROCEDURE — 99395 PREV VISIT EST AGE 18-39: CPT | Performed by: STUDENT IN AN ORGANIZED HEALTH CARE EDUCATION/TRAINING PROGRAM

## 2024-06-24 PROCEDURE — 99214 OFFICE O/P EST MOD 30 MIN: CPT | Performed by: STUDENT IN AN ORGANIZED HEALTH CARE EDUCATION/TRAINING PROGRAM

## 2024-06-24 NOTE — PROGRESS NOTES
Clinic Visit Note  Jay Ortiz 23 y.o. male   MRN: [id number]    Assessment and Plan      Diagnoses and all orders for this visit:    Annual physical exam  Physical exam questions up-to-date in chart, no visual, hearing, dental concerns at this time.  Patient will follow-up with yearly blood work, recommend fall follow-up.    Irritable bowel syndrome with constipation  Patient will follow-up with gastroenterology for colonoscopy, continue to monitor trigger foods, likely has gluten sensitivity    Episodic tension-type headache, not intractable  Stretching/strengthening cervical spine    Pneumothorax on right  History right pneumothorax, asymptomatic on exam    Upper extremity weakness  Follow-up thoracic outlet syndrome study planned for Thursday    My impressions and treatment recommendations were discussed in detail with the patient who verbalized understanding and had no further questions.  Discharge instructions were provided.    Subjective     Chief Complaint: AP, F/U    History of Present Illness:    Patient is a pleasant 23-year-old male coming in for follow-up after emergency room, follow-up chronic disease management.    The following portions of the patient's history were reviewed and updated as appropriate: allergies, current medications, past family history, past medical history, past social history, past surgical history and problem list.    REVIEW OF SYSTEMS:  A complete 12-point review of systems is negative other than that noted in the HPI.    Review of Systems   Constitutional:  Negative for chills, fatigue and fever.   HENT:  Negative for congestion and sore throat.    Eyes:  Negative for pain and visual disturbance.   Respiratory:  Negative for shortness of breath and wheezing.    Cardiovascular:  Negative for chest pain and palpitations.   Gastrointestinal:  Positive for constipation and diarrhea. Negative for abdominal pain, nausea and vomiting.   Genitourinary:  Negative for dysuria and  frequency.   Musculoskeletal:  Negative for back pain and neck pain.   Skin:  Negative for color change and rash.   Neurological:  Negative for dizziness and headaches.   Psychiatric/Behavioral:  Negative for agitation and confusion.    All other systems reviewed and are negative.        Current Outpatient Medications:   •  polyethylene glycol (MIRALAX) 17 g packet, Take 17 g by mouth 2 (two) times a day, Disp: 100 each, Rfl: 1  Past Medical History:   Diagnosis Date   • ADHD    • ADHD (attention deficit hyperactivity disorder)    • Anxiety    • Anxiety    • Depression    • Depression      Past Surgical History:   Procedure Laterality Date   • ADENOIDECTOMY     • TONSILLECTOMY     • TYMPANOSTOMY TUBE PLACEMENT      removed     Social History     Socioeconomic History   • Marital status: Single     Spouse name: Not on file   • Number of children: Not on file   • Years of education: Not on file   • Highest education level: Not on file   Occupational History   • Not on file   Tobacco Use   • Smoking status: Never   • Smokeless tobacco: Never   Vaping Use   • Vaping status: Never Used   Substance and Sexual Activity   • Alcohol use: Yes   • Drug use: Not Currently     Types: Marijuana   • Sexual activity: Not on file   Other Topics Concern   • Not on file   Social History Narrative    ** Merged History Encounter **          Social Determinants of Health     Financial Resource Strain: Not on file   Food Insecurity: Not on file   Transportation Needs: Not on file   Physical Activity: Not on file   Stress: Not on file   Social Connections: Not on file   Intimate Partner Violence: Not on file   Housing Stability: Not on file     Family History   Problem Relation Age of Onset   • No Known Problems Mother    • Diabetes Father      No Known Allergies    Objective     Vitals:    06/24/24 1232   BP: 120/70   BP Location: Left arm   Patient Position: Sitting   Cuff Size: Standard   Pulse: 100   Resp: 14   Temp: 98.7 °F (37.1 °C)  "  TempSrc: Temporal   SpO2: 98%   Weight: 64.4 kg (142 lb)   Height: 5' 5.5\" (1.664 m)       Physical Exam:     GENERAL: NAD, pleasant   HEENT:  NC/AT, PERRL, EOMI, no scleral icterus  CARDIAC:  RRR, +S1/S2, no S3/S4 appreciated, no m/g/r  PULMONARY:  CTA B/L, no wheezing/rales/rhonci, non-labored breathing  ABDOMEN:  Soft, NT/ND, no rebound/guarding/rigidity  Extremities:. No edema, cyanosis, or clubbing  Musculoskeletal:  Full range of motion intact in all extremities   NEUROLOGIC: Grossly intact, no focal deficits  SKIN:  No rashes or erythema noted on exposed skin  Psych: Normal affect, mood stable    ==  PLEASE NOTE:  This encounter was completed utilizing the [employer] Direct Speech Voice Recognition Software. Grammatical errors, random word insertions, pronoun errors and incomplete sentences are occasional consequences of the system due to software limitations, ambient noise and hardware issues.These may be missed by proof reading prior to affixing electronic signature. Any questions or concerns about the content, text or information contained within the body of this dictation should be directly addressed to the physician for clarification. Please do not hesitate to call me directly if you have any any questions or concerns.    Low Zaidi, DO  [facility]   [facility]     "

## 2024-06-27 ENCOUNTER — HOSPITAL ENCOUNTER (OUTPATIENT)
Dept: RADIOLOGY | Facility: HOSPITAL | Age: 24
Discharge: HOME | End: 2024-06-27
Attending: STUDENT IN AN ORGANIZED HEALTH CARE EDUCATION/TRAINING PROGRAM
Payer: COMMERCIAL

## 2024-06-27 DIAGNOSIS — R29.898 UPPER EXTREMITY WEAKNESS: ICD-10-CM

## 2024-06-27 PROCEDURE — 93923 UPR/LXTR ART STDY 3+ LVLS: CPT

## 2024-06-27 PROCEDURE — 93923 UPR/LXTR ART STDY 3+ LVLS: CPT | Performed by: SURGERY

## 2024-06-28 ENCOUNTER — TELEPHONE (OUTPATIENT)
Dept: FAMILY MEDICINE CLINIC | Facility: CLINIC | Age: 24
End: 2024-06-28

## 2024-06-28 DIAGNOSIS — G54.0 THORACIC OUTLET SYNDROME: Primary | ICD-10-CM

## 2024-06-28 NOTE — TELEPHONE ENCOUNTER
Dr. Zaidi:    Patient called asking for his results vascular study.  He received a call from surgeon to schedule an appointment but would like to know his results?  Please call back to discuss further.

## 2024-07-02 ENCOUNTER — TELEPHONE (OUTPATIENT)
Dept: FAMILY MEDICINE CLINIC | Facility: CLINIC | Age: 24
End: 2024-07-02

## 2024-07-02 NOTE — TELEPHONE ENCOUNTER
----- Message from Low Zaidi DO sent at 6/28/2024  6:43 AM EDT -----  Patient is tested positive for thoracic outlet surgery, recommend following up with thoracic surgery team to see if there is a procedure needed to help with symptoms.
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
## 2024-07-15 ENCOUNTER — OFFICE VISIT (OUTPATIENT)
Dept: CARDIAC SURGERY | Facility: CLINIC | Age: 24
End: 2024-07-15
Payer: COMMERCIAL

## 2024-07-15 VITALS
RESPIRATION RATE: 14 BRPM | TEMPERATURE: 97.9 F | WEIGHT: 137.35 LBS | BODY MASS INDEX: 22.07 KG/M2 | OXYGEN SATURATION: 98 % | HEIGHT: 66 IN | DIASTOLIC BLOOD PRESSURE: 62 MMHG | SYSTOLIC BLOOD PRESSURE: 105 MMHG | HEART RATE: 67 BPM

## 2024-07-15 DIAGNOSIS — G54.0 THORACIC OUTLET SYNDROME: ICD-10-CM

## 2024-07-15 PROCEDURE — 99245 OFF/OP CONSLTJ NEW/EST HI 55: CPT | Performed by: SURGERY
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
## 2024-07-26 ENCOUNTER — OFFICE VISIT (OUTPATIENT)
Dept: FAMILY MEDICINE CLINIC | Facility: CLINIC | Age: 24
End: 2024-07-26
Payer: COMMERCIAL

## 2024-07-26 VITALS
SYSTOLIC BLOOD PRESSURE: 92 MMHG | DIASTOLIC BLOOD PRESSURE: 60 MMHG | WEIGHT: 136 LBS | HEIGHT: 66 IN | HEART RATE: 78 BPM | RESPIRATION RATE: 14 BRPM | BODY MASS INDEX: 21.86 KG/M2 | TEMPERATURE: 98 F

## 2024-07-26 DIAGNOSIS — G44.219 EPISODIC TENSION-TYPE HEADACHE, NOT INTRACTABLE: ICD-10-CM

## 2024-07-26 DIAGNOSIS — G54.0 THORACIC OUTLET SYNDROME: ICD-10-CM

## 2024-07-26 DIAGNOSIS — R29.898 UPPER EXTREMITY WEAKNESS: ICD-10-CM

## 2024-07-26 DIAGNOSIS — R17 ELEVATED BILIRUBIN: ICD-10-CM
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
## 2024-07-26 DIAGNOSIS — S70.361A TICK BITE OF RIGHT THIGH, INITIAL ENCOUNTER: ICD-10-CM

## 2024-07-26 DIAGNOSIS — K58.1 IRRITABLE BOWEL SYNDROME WITH CONSTIPATION: Primary | ICD-10-CM

## 2024-07-26 PROCEDURE — 99214 OFFICE O/P EST MOD 30 MIN: CPT | Performed by: STUDENT IN AN ORGANIZED HEALTH CARE EDUCATION/TRAINING PROGRAM

## 2024-07-26 NOTE — PROGRESS NOTES
Clinic Visit Note  Jay Ortiz 23 y.o. male   MRN: [id number]    Assessment and Plan      Diagnoses and all orders for this visit:    Irritable bowel syndrome with constipation  Patient will follow-up with gastroenterology on August 2, appreciate recommendations, continue to monitor triggers    Thoracic outlet syndrome  Patient will be starting physical therapy, reevaluation with thoracic surgery afterwards    Episodic tension-type headache, not intractable    Upper extremity weakness    Elevated bilirubin  -     US right upper quadrant; Future    Tick bite of right thigh, initial encounter  -     Lyme Total AB W Reflex to IGM/IGG; Future  -     Lyme Total AB W Reflex to IGM/IGG    My impressions and treatment recommendations were discussed in detail with the patient who verbalized understanding and had no further questions.  Discharge instructions were provided.    Subjective     Chief Complaint: F/U    History of Present Illness:    Patient is a pleasant 23-year-old male coming in for follow-up chronic disease management.    The following portions of the patient's history were reviewed and updated as appropriate: allergies, current medications, past family history, past medical history, past social history, past surgical history and problem list.    REVIEW OF SYSTEMS:  A complete 12-point review of systems is negative other than that noted in the HPI.    Review of Systems   Constitutional:  Negative for chills, fatigue and fever.   HENT:  Negative for congestion and sore throat.    Eyes:  Negative for pain and visual disturbance.   Respiratory:  Negative for shortness of breath and wheezing.    Cardiovascular:  Negative for chest pain and palpitations.   Gastrointestinal:  Positive for constipation and diarrhea. Negative for abdominal pain, nausea and vomiting.   Genitourinary:  Negative for dysuria and frequency.   Musculoskeletal:  Negative for back pain and neck pain.   Skin:  Negative for color change  "and rash.   Neurological:  Negative for dizziness and headaches.   Psychiatric/Behavioral:  Negative for agitation and confusion.    All other systems reviewed and are negative.      No current outpatient medications on file.  Past Medical History:   Diagnosis Date   • ADHD    • ADHD (attention deficit hyperactivity disorder)    • Anxiety    • Anxiety    • Depression    • Depression      Past Surgical History:   Procedure Laterality Date   • ADENOIDECTOMY     • TONSILLECTOMY     • TYMPANOSTOMY TUBE PLACEMENT      removed     Social History     Socioeconomic History   • Marital status: Single     Spouse name: Not on file   • Number of children: Not on file   • Years of education: Not on file   • Highest education level: Not on file   Occupational History   • Not on file   Tobacco Use   • Smoking status: Never   • Smokeless tobacco: Never   Vaping Use   • Vaping status: Never Used   Substance and Sexual Activity   • Alcohol use: Yes   • Drug use: Not Currently     Types: Marijuana   • Sexual activity: Not on file   Other Topics Concern   • Not on file   Social History Narrative    ** Merged History Encounter **          Social Determinants of Health     Financial Resource Strain: Not on file   Food Insecurity: Not on file   Transportation Needs: Not on file   Physical Activity: Not on file   Stress: Not on file   Social Connections: Not on file   Intimate Partner Violence: Not on file   Housing Stability: Not on file     Family History   Problem Relation Age of Onset   • No Known Problems Mother    • Diabetes Father      No Known Allergies    Objective     Vitals:    07/26/24 0812   BP: 92/60   BP Location: Left arm   Patient Position: Sitting   Cuff Size: Standard   Pulse: 78   Resp: 14   Temp: 98 °F (36.7 °C)   TempSrc: Temporal   Weight: 61.7 kg (136 lb)   Height: 5' 5.5\" (1.664 m)       Physical Exam:     GENERAL: NAD, pleasant   HEENT:  NC/AT, PERRL, EOMI, no scleral icterus  CARDIAC:  RRR, +S1/S2, no S3/S4 " appreciated, no m/g/r  PULMONARY:  CTA B/L, no wheezing/rales/rhonci, non-labored breathing  ABDOMEN:  Soft, NT/ND, no rebound/guarding/rigidity  Extremities:. No edema, cyanosis, or clubbing  Musculoskeletal:  Full range of motion intact in all extremities   NEUROLOGIC: Grossly intact, no focal deficits  SKIN:  No rashes or erythema noted on exposed skin  Psych: Normal affect, mood stable    ==  PLEASE NOTE:  This encounter was completed utilizing the [employer] Direct Speech Voice Recognition Software. Grammatical errors, random word insertions, pronoun errors and incomplete sentences are occasional consequences of the system due to software limitations, ambient noise and hardware issues.These may be missed by proof reading prior to affixing electronic signature. Any questions or concerns about the content, text or information contained within the body of this dictation should be directly addressed to the physician for clarification. Please do not hesitate to call me directly if you have any any questions or concerns.    Low Zaidi, DO  [facility]   [facility]

## 2024-07-27 ENCOUNTER — HOSPITAL ENCOUNTER (OUTPATIENT)
Dept: RADIOLOGY | Facility: HOSPITAL | Age: 24
Discharge: HOME | End: 2024-07-27
Attending: STUDENT IN AN ORGANIZED HEALTH CARE EDUCATION/TRAINING PROGRAM
Payer: COMMERCIAL

## 2024-07-27 DIAGNOSIS — R17 ELEVATED BILIRUBIN: ICD-10-CM

## 2024-07-27 PROCEDURE — 76705 ECHO EXAM OF ABDOMEN: CPT

## 2024-07-29 ENCOUNTER — TELEPHONE (OUTPATIENT)
Age: 24
End: 2024-07-29

## 2024-07-29 ENCOUNTER — EVALUATION (OUTPATIENT)
Dept: PHYSICAL THERAPY | Facility: CLINIC | Age: 24
End: 2024-07-29
Payer: COMMERCIAL

## 2024-07-29 DIAGNOSIS — G44.86 CERVICOGENIC HEADACHE: Primary | ICD-10-CM

## 2024-07-29 DIAGNOSIS — G54.0 THORACIC OUTLET SYNDROME: ICD-10-CM

## 2024-07-29 DIAGNOSIS — R20.1 IMPAIRED SENSATION: ICD-10-CM

## 2024-07-29 PROCEDURE — 97162 PT EVAL MOD COMPLEX 30 MIN: CPT

## 2024-07-29 NOTE — TELEPHONE ENCOUNTER
Patient called asking for results of ultrasound. I explained his results were still in process. He is asking for a return phone call once results are final and PCP has advised on them please.     I routed to clerical by mistake. My apologies.
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
## 2024-08-01 ENCOUNTER — TELEPHONE (OUTPATIENT)
Dept: PHYSICAL THERAPY | Facility: CLINIC | Age: 24
End: 2024-08-01

## 2024-08-01 ENCOUNTER — APPOINTMENT (OUTPATIENT)
Dept: PHYSICAL THERAPY | Facility: CLINIC | Age: 24
End: 2024-08-01
Payer: COMMERCIAL

## 2024-08-01 NOTE — TELEPHONE ENCOUNTER
Left Message - Followed up regarding HEP, changes in symptoms, and rescheduling for earlier appointment.

## 2024-08-02 ENCOUNTER — TELEPHONE (OUTPATIENT)
Age: 24
End: 2024-08-02

## 2024-08-02 ENCOUNTER — CONSULT (OUTPATIENT)
Dept: GASTROENTEROLOGY | Facility: CLINIC | Age: 24
End: 2024-08-02
Payer: COMMERCIAL

## 2024-08-02 VITALS
SYSTOLIC BLOOD PRESSURE: 111 MMHG | WEIGHT: 135.6 LBS | DIASTOLIC BLOOD PRESSURE: 68 MMHG | BODY MASS INDEX: 21.79 KG/M2 | HEIGHT: 66 IN | HEART RATE: 78 BPM

## 2024-08-02 DIAGNOSIS — R17 ELEVATED BILIRUBIN: ICD-10-CM

## 2024-08-02 DIAGNOSIS — K59.00 CONSTIPATION, UNSPECIFIED CONSTIPATION TYPE: Primary | ICD-10-CM

## 2024-08-02 DIAGNOSIS — K62.5 BRIGHT RED BLOOD PER RECTUM: ICD-10-CM

## 2024-08-02 PROCEDURE — 99204 OFFICE O/P NEW MOD 45 MIN: CPT | Performed by: PHYSICIAN ASSISTANT

## 2024-08-02 RX ORDER — HYDROCORTISONE ACETATE PRAMOXINE HCL 2.5; 1 G/100G; G/100G
CREAM TOPICAL 3 TIMES DAILY
Qty: 28 G | Refills: 1 | Status: SHIPPED | OUTPATIENT
Start: 2024-08-02

## 2024-08-02 RX ORDER — AMOXICILLIN 500 MG/1
500 CAPSULE ORAL EVERY 8 HOURS SCHEDULED
COMMUNITY

## 2024-08-02 NOTE — TELEPHONE ENCOUNTER
PA for analpram pa SUBMITTED     via    []CMM-KEY   [x]Inez-Case ID # [id number]:[facility] DELIVERYPhone:[phone]:[phone]   []Faxed to plan   []Other website   []Phone call Case ID #     Office notes sent, clinical questions answered. Awaiting determination    Turnaround time for your insurance to make a decision on your Prior Authorization can take 7-21 business days.            LMP

## 2024-08-02 NOTE — LETTER
August 2, 2024       No Recipients    Patient: Jay Ortiz   YOB: 2000   Date of Visit: 8/2/2024       Dear Dr. Zaidi:    Thank you for referring Jay Ortiz to me for evaluation. Below are my notes for this consultation.    If you have questions, please do not hesitate to call me. I look forward to following your patient along with you.         Sincerely,        Yoseph Gupta PA-C        CC:   No Recipients    Yoseph Gupta PA-C  8/2/2024  2:08 PM  Incomplete  [facility] - Outpatient Consultation  Jay Ortiz 23 y.o. male MRN: [id number]  Encounter: [id number]          ASSESSMENT AND PLAN:      1. Constipation, unspecified constipation type  -Recommend good daily fluids and fiber  -Plan x-ray to assess stool burden  - start miralax    2. Bright red blood per rectum  -Likely hemorrhoidal however need to rule out underlying inflammatory bowel disease or neoplasm  - Plan colonoscopy  - treat for likely hemorrhoids    3. Elevated bilirubin  - 6/2024 Bilai 2.1, 5/2023 1.7  - US 7/27/24 normal liver, gall bladder and biliary tree  - likely [place] dz  - get Hepatic function panel to fractionate bilirubin  ______________________________________________________________________    Chief Complaint: Constipation and rectal bleeding    HPI: This is a 23-year-old white male with past medical history of ADHD, anxiety depression who presents with chief concerns for constipation and rectal bleeding.        Constipation  Years of issues - months  Number of bowel movements weekly - 2-3  Straining - Yes  Sense of incomplete bowel movements - Yes  History of hemorrhoids - no that he has ever been told  History of rectal bleeding - yes  Current Opioid use - No  Current medications - none  OTC meds in past - dietary fiber  Rx meds in past - None  Last colonoscopy -none previously      Endoscopy History:  EGD -none previously  Colonoscopy -none  "previously    REVIEW OF SYSTEMS:    CONSTITUTIONAL: Denies any fever, chills, rigors, and weight loss.  HEENT: Denies hearing loss or visual disturbances.  CARDIOVASCULAR: No chest pain or palpitations.   RESPIRATORY: Denies any cough, hemoptysis, shortness of breath or dyspnea on exertion.  GASTROINTESTINAL: As noted in the History of Present Illness.   GENITOURINARY: No problems with urination. Denies any hematuria or dysuria.  NEUROLOGIC: No dizziness or vertigo, denies headaches.   MUSCULOSKELETAL: Denies any muscle or joint pain.   SKIN: Denies skin rashes or itching.   ENDOCRINE: Denies excessive thirst. Denies intolerance to heat or cold.  PSYCHOSOCIAL: Denies depression or anxiety. Denies any recent memory loss.       Historical Information  Past Medical History:   Diagnosis Date   • ADHD    • ADHD (attention deficit hyperactivity disorder)    • Anxiety    • Anxiety    • Depression    • Depression      Past Surgical History:   Procedure Laterality Date   • ADENOIDECTOMY     • TONSILLECTOMY     • TYMPANOSTOMY TUBE PLACEMENT      removed     Social History  Social History     Substance and Sexual Activity   Alcohol Use Not Currently     Social History     Substance and Sexual Activity   Drug Use Not Currently   • Types: Marijuana     Social History     Tobacco Use   Smoking Status Never   Smokeless Tobacco Never     Family History   Problem Relation Age of Onset   • No Known Problems Mother    • Diabetes Father        Meds/Allergies      Current Outpatient Medications:   •  amoxicillin (AMOXIL) 500 mg capsule    No Known Allergies        Objective    Blood pressure 111/68, pulse 78, height 5' 5.5\" (1.664 m), weight 61.5 kg (135 lb 9.6 oz). Body mass index is 22.22 kg/m².        PHYSICAL EXAM:      General Appearance:   Alert, cooperative, no distress, appears stated age   HEENT:   Normocephalic, atraumatic, anicteric.     Neck:  Supple, symmetrical   Lungs:   Clear to auscultation bilaterally; no rales, " rhonchi or wheezing; respirations unlabored    Heart::   Regular rate and rhythm; no murmur, rub, or gallop.   Abdomen:   Soft, non-tender, non-distended; normal bowel sounds; no masses, no organomegaly    Genitalia:   Deferred    Rectal:   Deferred    Extremities:  No cyanosis, clubbing or edema    Pulses:  Not assessed   Skin:  No jaundice, rashes, or lesions    Lymph nodes:  Not assessed       Lab Results:   No visits with results within 1 Day(s) from this visit.   Latest known visit with results is:   Office Visit on 09/05/2023   Component Date Value   • POCT SARS-CoV-2 Ag 09/05/2023 Negative    • VALID CONTROL 09/05/2023 Valid          Radiology Results:   US right upper quadrant    Result Date: 7/29/2024  Narrative: RIGHT UPPER QUADRANT ULTRASOUND INDICATION: R17: Unspecified jaundice. Patient reports intermittent right upper quadrant pain for several months. As per review of electronic medical record, elevated bilirubin on labs from 4/11/2021 and 8/20/2020. COMPARISON: CT of the chest, abdomen and pelvis from 6/28/2020. TECHNIQUE:  Real-time ultrasound of the right upper quadrant was performed with a curvilinear transducer with both volumetric sweeps and still imaging techniques. FINDINGS: PANCREAS:  The visualized portions of the pancreas are within normal limits. AORTA AND IVC:  Visualized portions are normal for patient age. LIVER: Size:  Within normal range.  The liver measures 16.4 cm in the midclavicular line. Contour:  Surface contour is smooth. Parenchyma:  Echogenicity and echotexture are within normal limits. No evidence of suspicious mass. Limited imaging of the main portal vein shows it to be patent and demonstrating hepatopetal flow. BILIARY: No gallbladder wall thickening or pericholecystic fluid. No gallstones or sludge identified. The sonographic Perry's sign was negative. No intrahepatic biliary dilatation. CBD measures 3.0 mm, which is within normal limits. No choledocholithiasis. RIGHT  KIDNEY: The right kidney measures 11.0 x 4.5 x 5.4 cm. No hydronephrosis or shadowing calculi. ASCITES:  No right upper quadrant ascites.     Impression: Unremarkable right upper quadrant ultrasound. In particular, no evidence of biliary ductal dilatation. Workstation performed: [id number]       Yoseph Gupta PA-C  8/2/2024 12:52 PM  Sign when Signing Visit  [facility] - Outpatient Consultation  Jay Ortiz 23 y.o. male MRN: [id number]  Encounter: [id number]          ASSESSMENT AND PLAN:      1. Constipation, unspecified constipation type  -Recommend good daily fluids and fiber  -Plan x-ray to assess stool burden    2. Bright red blood per rectum  -Likely hemorrhoidal however need to rule out underlying inflammatory bowel disease or neoplasm    ______________________________________________________________________    Chief Complaint: Constipation and rectal bleeding    HPI: This is a 23-year-old white male with past medical history of ADHD, anxiety depression who presents with chief concerns for constipation and rectal bleeding.    Constipation  Years of issues -   Number of bowel movements weekly -   Straining - Yes  Sense of incomplete bowel movements - Yes  History of hemorrhoids -   History of rectal bleeding -   Current Opioid use - No  Current medications -   OTC meds in past -   Rx meds in past -   Last colonoscopy -none previously      Endoscopy History:  EGD -none previously  Colonoscopy -none previously    REVIEW OF SYSTEMS:    CONSTITUTIONAL: Denies any fever, chills, rigors, and weight loss.  HEENT: Denies hearing loss or visual disturbances.  CARDIOVASCULAR: No chest pain or palpitations.   RESPIRATORY: Denies any cough, hemoptysis, shortness of breath or dyspnea on exertion.  GASTROINTESTINAL: As noted in the History of Present Illness.   GENITOURINARY: No problems with urination. Denies any hematuria or dysuria.  NEUROLOGIC: No  dizziness or vertigo, denies headaches.   MUSCULOSKELETAL: Denies any muscle or joint pain.   SKIN: Denies skin rashes or itching.   ENDOCRINE: Denies excessive thirst. Denies intolerance to heat or cold.  PSYCHOSOCIAL: Denies depression or anxiety. Denies any recent memory loss.       Historical Information  Past Medical History:   Diagnosis Date   • ADHD    • ADHD (attention deficit hyperactivity disorder)    • Anxiety    • Anxiety    • Depression    • Depression      Past Surgical History:   Procedure Laterality Date   • ADENOIDECTOMY     • TONSILLECTOMY     • TYMPANOSTOMY TUBE PLACEMENT      removed     Social History  Social History     Substance and Sexual Activity   Alcohol Use Yes     Social History     Substance and Sexual Activity   Drug Use Not Currently   • Types: Marijuana     Social History     Tobacco Use   Smoking Status Never   Smokeless Tobacco Never     Family History   Problem Relation Age of Onset   • No Known Problems Mother    • Diabetes Father        Meds/Allergies    No current outpatient medications on file.    No Known Allergies        Objective    There were no vitals taken for this visit. There is no height or weight on file to calculate BMI.        PHYSICAL EXAM:      General Appearance:   Alert, cooperative, no distress, appears stated age   HEENT:   Normocephalic, atraumatic, anicteric.     Neck:  Supple, symmetrical   Lungs:   Clear to auscultation bilaterally; no rales, rhonchi or wheezing; respirations unlabored    Heart::   Regular rate and rhythm; no murmur, rub, or gallop.   Abdomen:   Soft, non-tender, non-distended; normal bowel sounds; no masses, no organomegaly    Genitalia:   Deferred    Rectal:   Deferred    Extremities:  No cyanosis, clubbing or edema    Pulses:  Not assessed   Skin:  No jaundice, rashes, or lesions    Lymph nodes:  Not assessed       Lab Results:   No visits with results within 1 Day(s) from this visit.   Latest known visit with results is:   Office  Visit on 09/05/2023   Component Date Value   • POCT SARS-CoV-2 Ag 09/05/2023 Negative    • VALID CONTROL 09/05/2023 Valid          Radiology Results:   US right upper quadrant    Result Date: 7/29/2024  Narrative: RIGHT UPPER QUADRANT ULTRASOUND INDICATION: R17: Unspecified jaundice. Patient reports intermittent right upper quadrant pain for several months. As per review of electronic medical record, elevated bilirubin on labs from 4/11/2021 and 8/20/2020. COMPARISON: CT of the chest, abdomen and pelvis from 6/28/2020. TECHNIQUE:  Real-time ultrasound of the right upper quadrant was performed with a curvilinear transducer with both volumetric sweeps and still imaging techniques. FINDINGS: PANCREAS:  The visualized portions of the pancreas are within normal limits. AORTA AND IVC:  Visualized portions are normal for patient age. LIVER: Size:  Within normal range.  The liver measures 16.4 cm in the midclavicular line. Contour:  Surface contour is smooth. Parenchyma:  Echogenicity and echotexture are within normal limits. No evidence of suspicious mass. Limited imaging of the main portal vein shows it to be patent and demonstrating hepatopetal flow. BILIARY: No gallbladder wall thickening or pericholecystic fluid. No gallstones or sludge identified. The sonographic Perry's sign was negative. No intrahepatic biliary dilatation. CBD measures 3.0 mm, which is within normal limits. No choledocholithiasis. RIGHT KIDNEY: The right kidney measures 11.0 x 4.5 x 5.4 cm. No hydronephrosis or shadowing calculi. ASCITES:  No right upper quadrant ascites.     Impression: Unremarkable right upper quadrant ultrasound. In particular, no evidence of biliary ductal dilatation. Workstation performed: [id number]       Yoseph Gupta PA-C

## 2024-08-02 NOTE — PROGRESS NOTES
[facility] - Outpatient Consultation  Jay Ortiz 23 y.o. male MRN: [id number]  Encounter: [id number]          ASSESSMENT AND PLAN:      1. Constipation, unspecified constipation type  -Recommend good daily fluids and fiber  -Plan x-ray to assess stool burden  - start miralax    2. Bright red blood per rectum  -Likely hemorrhoidal however need to rule out underlying inflammatory bowel disease or neoplasm  - Plan colonoscopy  - treat for likely hemorrhoids  - I have reviewed the risks of endoscopy which include but are not limited to; anesthesia complications, injury/perforation of the bowel, infection and bleeding.  Patient given the opportunity to review the full consent form.'    3. Elevated bilirubin  - 6/2024 Bilai 2.1, 5/2023 1.7  - US 7/27/24 normal liver, gall bladder and biliary tree  - likely [place] dz  - get Hepatic function panel to fractionate bilirubin  ______________________________________________________________________    Chief Complaint: Constipation and rectal bleeding    HPI: This is a 23-year-old white male with past medical history of ADHD, anxiety depression who presents with chief concerns for constipation and rectal bleeding.    Constipation  Years of issues - months  Number of bowel movements weekly - 2-3  Straining - Yes  Sense of incomplete bowel movements - Yes  History of hemorrhoids - no that he has ever been told  History of rectal bleeding - yes  Current Opioid use - No  Current medications - none  OTC meds in past - dietary fiber  Rx meds in past - None  Last colonoscopy -none previously    Symptoms have been present over the past few months.  He reports straining and incomplete bowel movements.  Has been trying dietary fiber to help.  Notices stools can be gray to dark at times.  Also reports history of elevated bilirubin in the past.  June 2024 AST mildly elevated at 39 bilirubin 2.1.  Denies any alcohol use.  Back in May 2023 bilirubin was  1.7.    Denies family history of colorectal cancer or inflammatory bowel disease.  No pacemaker or defibrillator implanted.   No chronic kidney disease or solitary kidney.  Not on any supplemental oxygen at night.  Not on any antiplatelet or anticoagulants.        Endoscopy History:  EGD -none previously  Colonoscopy -none previously    REVIEW OF SYSTEMS:    CONSTITUTIONAL: Denies any fever, chills, rigors, and weight loss.  HEENT: Denies hearing loss or visual disturbances.  CARDIOVASCULAR: No chest pain or palpitations.   RESPIRATORY: Denies any cough, hemoptysis, shortness of breath or dyspnea on exertion.  GASTROINTESTINAL: As noted in the History of Present Illness.   GENITOURINARY: No problems with urination. Denies any hematuria or dysuria.  NEUROLOGIC: No dizziness or vertigo, denies headaches.   MUSCULOSKELETAL: Denies any muscle or joint pain.   SKIN: Denies skin rashes or itching.   ENDOCRINE: Denies excessive thirst. Denies intolerance to heat or cold.  PSYCHOSOCIAL: Denies depression or anxiety. Denies any recent memory loss.       Historical Information   Past Medical History:   Diagnosis Date    ADHD     ADHD (attention deficit hyperactivity disorder)     Anxiety     Anxiety     Depression     Depression      Past Surgical History:   Procedure Laterality Date    ADENOIDECTOMY      TONSILLECTOMY      TYMPANOSTOMY TUBE PLACEMENT      removed     Social History   Social History     Substance and Sexual Activity   Alcohol Use Not Currently     Social History     Substance and Sexual Activity   Drug Use Not Currently    Types: Marijuana     Social History     Tobacco Use   Smoking Status Never   Smokeless Tobacco Never     Family History   Problem Relation Age of Onset    No Known Problems Mother     Diabetes Father        Meds/Allergies       Current Outpatient Medications:     amoxicillin (AMOXIL) 500 mg capsule    hydrocortisone-pramoxine (ANALPRAM-HC) 2.5-1 % rectal cream    polyethylene glycol  "(GOLYTELY) 4000 mL solution    No Known Allergies        Objective     Blood pressure 111/68, pulse 78, height 5' 5.5\" (1.664 m), weight 61.5 kg (135 lb 9.6 oz). Body mass index is 22.22 kg/m².        PHYSICAL EXAM:      General Appearance:   Alert, cooperative, no distress, appears stated age   HEENT:   Normocephalic, atraumatic, anicteric.     Neck:  Supple, symmetrical   Lungs:   Clear to auscultation bilaterally; no rales, rhonchi or wheezing; respirations unlabored    Heart::   Regular rate and rhythm; no murmur, rub, or gallop.   Abdomen:   Soft, non-tender, non-distended; normal bowel sounds; no masses, no organomegaly    Genitalia:   Deferred    Rectal:   Deferred    Extremities:  No cyanosis, clubbing or edema    Pulses:  Not assessed   Skin:  No jaundice, rashes, or lesions    Lymph nodes:  Not assessed       Lab Results:   No visits with results within 1 Day(s) from this visit.   Latest known visit with results is:   Office Visit on 09/05/2023   Component Date Value    POCT SARS-CoV-2 Ag 09/05/2023 Negative     VALID CONTROL 09/05/2023 Valid          Radiology Results:   US right upper quadrant    Result Date: 7/29/2024  Narrative: RIGHT UPPER QUADRANT ULTRASOUND INDICATION: R17: Unspecified jaundice. Patient reports intermittent right upper quadrant pain for several months. As per review of electronic medical record, elevated bilirubin on labs from 4/11/2021 and 8/20/2020. COMPARISON: CT of the chest, abdomen and pelvis from 6/28/2020. TECHNIQUE:  Real-time ultrasound of the right upper quadrant was performed with a curvilinear transducer with both volumetric sweeps and still imaging techniques. FINDINGS: PANCREAS:  The visualized portions of the pancreas are within normal limits. AORTA AND IVC:  Visualized portions are normal for patient age. LIVER: Size:  Within normal range.  The liver measures 16.4 cm in the midclavicular line. Contour:  Surface contour is smooth. Parenchyma:  Echogenicity and " echotexture are within normal limits. No evidence of suspicious mass. Limited imaging of the main portal vein shows it to be patent and demonstrating hepatopetal flow. BILIARY: No gallbladder wall thickening or pericholecystic fluid. No gallstones or sludge identified. The sonographic Perry's sign was negative. No intrahepatic biliary dilatation. CBD measures 3.0 mm, which is within normal limits. No choledocholithiasis. RIGHT KIDNEY: The right kidney measures 11.0 x 4.5 x 5.4 cm. No hydronephrosis or shadowing calculi. ASCITES:  No right upper quadrant ascites.     Impression: Unremarkable right upper quadrant ultrasound. In particular, no evidence of biliary ductal dilatation. Workstation performed: [id number]       Yoseph Gupta PA-C

## 2024-08-05 NOTE — TELEPHONE ENCOUNTER
Approved    Prior authorization approved  Payer: [employer] Case ID: [id number]    [phone]    [phone]  Note from payer: CaseId:[id number];Status:Approved;Review Type:Prior Auth;Coverage Start Date:07/03/2024;Coverage End Date:08/02/2025;  Approval Details    Authorized from July 3, 2024 to August 2, 2025  Electronic appeal: Not supported  View History  Medication Being Authorized    hydrocortisone-pramoxine (ANALPRAM-HC) 2.5-1 % rectal cream  Apply topically 3 (three) times a day  Dispense: 28 g Refills: 1   Start: 8/2/2024   Class: Normal Diagnoses: Bright red blood per rectum   This order has been released to its destination.  To be filled at: [facility] [address] for  hydrocortisone-pramoxine (ANALPRAM-HC) 2.5-1 % rectal cream  Approved    Date(s) approved July 3, 2024 to August 2, 2025    Case #[id number]    Patient advised by          [] MyChart Message  [x] Phone call , voiced understanding  []LMOM  []L/M to call office as no active Communication consent on file  []Unable to leave detailed message as VM not approved on Communication consent       Pharmacy advised by    [x]Fax  []Phone call    Approval letter NOT scanned into Media No letter at time of approval

## 2024-08-08 ENCOUNTER — APPOINTMENT (OUTPATIENT)
Dept: PHYSICAL THERAPY | Facility: CLINIC | Age: 24
End: 2024-08-08
Payer: COMMERCIAL

## 2024-08-13 ENCOUNTER — TELEPHONE (OUTPATIENT)
Age: 24
End: 2024-08-13

## 2024-08-13 ENCOUNTER — OFFICE VISIT (OUTPATIENT)
Dept: PHYSICAL THERAPY | Facility: CLINIC | Age: 24
End: 2024-08-13
Payer: COMMERCIAL

## 2024-08-13 DIAGNOSIS — G54.0 THORACIC OUTLET SYNDROME: Primary | ICD-10-CM

## 2024-08-13 DIAGNOSIS — R20.1 IMPAIRED SENSATION: ICD-10-CM

## 2024-08-13 DIAGNOSIS — G44.86 CERVICOGENIC HEADACHE: ICD-10-CM

## 2024-08-13 PROCEDURE — 97110 THERAPEUTIC EXERCISES: CPT

## 2024-08-13 NOTE — TELEPHONE ENCOUNTER
Ruby at [facility] # [phone]  stated she will fax a medical clearance for surgery.         Please have PCP complete for asap as pt's surgery is on Friday 8/16/24.         Thank you for your assistance.

## 2024-08-13 NOTE — PROGRESS NOTES
Daily Note     Today's date: 2024  Patient name: Jay Ortiz  : 2000  MRN: [id number]  Referring provider: Dc Gibson*  Dx:   Encounter Diagnosis     ICD-10-CM    1. Thoracic outlet syndrome  G54.0       2. Cervicogenic headache  G44.86       3. Impaired sensation  R20.1           Start Time: 1015  Stop Time: 1100  Total time in clinic (min): 45 minutes    Subjective: Jay reports he is feeling about the same overall since evaluation, however he has noticed his headaches have been improving.      Objective: See treatment diary below     Strength   L: 25, 30, 35   R: 65, 65, 50    Sensory   Light touch: diminished/altered L UE   Sharp dull: in tact       MMT  MMT       Left Right Left Right   Shoulder flex 4 4+     Shoulder abd  4 4+     Elbow flex 4 4+     Elbow ext 3+ 4+     Wrist flex 3+ 4+     Wrist ext 3+ 4+     Thumb abd  4 4+       DTRs   Left  C5/C6 Bicep: 2+  C6 Brachioradialis: 2+   C7 Tricep: 1+  L4 Patellar: 2+  S1 Achilles: 2+     Right   C5/C6 Bicep: 2+  C6 Brachioradialis: 2+  C7 Tricep: 2+  L4 Patellar: 2+  S1 Achilles: 2+    Special Tests  Adson's: R positive - pulse diminished     Orthostatic testing  Seated: 114/76 HR 84, 124/81 (after supine)  Supine: 112/68 HR 71  Standin/69, HR 94    Muscle Tone  No clonus noted   Slight increase in tone/guarding with L UE when testing bicep     Assessment: Tolerated treatment well. Completed further testing today to assess neurological impairments, special tests, and strength deficits. Upon TOS special tests, pt demonstrated significant diminishment of radial pulse on L UE. He demonstrated decreased  strength, decreased L UE strength, diminished sensation, and mild hypoactive reflex at C7 region. Completed orthostatic testing to determine if dizziness/SOB/brain fog upon change in position was due to BP changes; results did not suggest he is currently experiencing orthostatic hypotension, however will  continue to monitor and re-assess as appropriate. Discussed pursuing further imaging on cervical and thoracic spine to rule out spinal cord involvement with pt in agreement. Instructed to continue performing cervical retraction as it has helped reduce his headaches, however instructed to terminate if UE symptoms begin to worsen with repeated movement. Patient would benefit from continued PT      Plan: Continue per plan of care.  Progress treatment as tolerated.       Precautions: frequent SOB and lightheadedness with laying flat        Insurance:  AMA/CMS Eval/ Re-eval Auth #/ Referral # Total units or visits Start date  Expiration date KX? Visit limitation?  PT only or  PT+OT? Co-Insurance   CMS 7/29/24 Not required        30% coinsurance                  POC Start Date POC Expiration Date Signed POC?   7/29/2024 9/23/2024 pending      Date               Visits/Units:  Used               Authed:  Remaining                    Access Code: [email or web address]  URL: [email or web address]  Date: 07/29/2024  Prepared by: Katarzyna Tristan    Exercises  - Seated Scapular Retraction  - 1 x daily - 7 x weekly - 2 sets - 10 reps - 5sec hold   - Seated Cervical Retraction  - 1 x daily - 7 x weekly - 2 sets - 10 reps - 5sec hold  - Seated Thoracic Extension with Hands Behind Neck  - 1 x daily - 7 x weekly - 2 sets - 10 reps - 5sec hold (HOLD)    date   8/13 7/29   Visit number     1   Manuals                                   Neuro Re-Ed       Scap retraction     +postural cues 5x                                              Ther Ex       HEP   Cervical retraction w/ instruction Initiated     Education & management    Neurological testing completed, POC  Posture, TOS, possible MD referral    Cervical retraction    10x improved    Thoracic extension    5x                                Ther Activity                     Gait Training                     Modalities

## 2024-08-14 NOTE — TELEPHONE ENCOUNTER
Form received and placed in Dr Zaidi's folder. Called Florinda at their office and advised Dr Gabriel is out of office today and will address when he returns.

## 2024-08-14 NOTE — TELEPHONE ENCOUNTER
Florinda called from from oral surgeon's office. She advised patient is having 4 wisdom teeth extracted 8/16 under sedation. They are faxing pre op forms again as they had wrong #. Please advise if you need to see patient for preop appointment. Patient was seen 7/26/24 for different matter.

## 2024-08-15 ENCOUNTER — HOSPITAL ENCOUNTER (OUTPATIENT)
Dept: RADIOLOGY | Facility: HOSPITAL | Age: 24
Discharge: HOME | End: 2024-08-15
Payer: COMMERCIAL

## 2024-08-15 ENCOUNTER — APPOINTMENT (OUTPATIENT)
Dept: LAB | Facility: HOSPITAL | Age: 24
End: 2024-08-15
Attending: PSYCHIATRY & NEUROLOGY
Payer: COMMERCIAL
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
## 2024-08-15 DIAGNOSIS — Z13.0 SCREENING, IRON DEFICIENCY ANEMIA: ICD-10-CM

## 2024-08-15 DIAGNOSIS — Z13.220 SCREENING CHOLESTEROL LEVEL: ICD-10-CM

## 2024-08-15 DIAGNOSIS — R17 ELEVATED BILIRUBIN: ICD-10-CM

## 2024-08-15 DIAGNOSIS — S70.361A TICK BITE OF RIGHT THIGH, INITIAL ENCOUNTER: ICD-10-CM

## 2024-08-15 DIAGNOSIS — Z13.1 DIABETES MELLITUS SCREENING: ICD-10-CM

## 2024-08-15 DIAGNOSIS — E87.8 ELECTROLYTE ABNORMALITY: ICD-10-CM

## 2024-08-15 DIAGNOSIS — K90.0 TRANSIENT GLUTEN SENSITIVITY: ICD-10-CM

## 2024-08-15 DIAGNOSIS — Z13.29 THYROID DISORDER SCREENING: ICD-10-CM

## 2024-08-15 DIAGNOSIS — K62.5 BRIGHT RED BLOOD PER RECTUM: ICD-10-CM

## 2024-08-15 LAB
ALBUMIN SERPL BCG-MCNC: 4.7 G/DL (ref 3.5–5)
ALP SERPL-CCNC: 55 U/L (ref 34–104)
ALT SERPL W P-5'-P-CCNC: 26 U/L (ref 7–52)
ANION GAP SERPL CALCULATED.3IONS-SCNC: 6 MMOL/L (ref 4–13)
AST SERPL W P-5'-P-CCNC: 16 U/L (ref 13–39)
BASOPHILS # BLD AUTO: 0.03 THOUSANDS/ÂΜL (ref 0–0.1)
BASOPHILS NFR BLD AUTO: 1 % (ref 0–1)
BILIRUB DIRECT SERPL-MCNC: 0.44 MG/DL (ref 0–0.2)
BILIRUB SERPL-MCNC: 3.23 MG/DL (ref 0.2–1)
BUN SERPL-MCNC: 17 MG/DL (ref 5–25)
CALCIUM SERPL-MCNC: 9.8 MG/DL (ref 8.4–10.2)
CHLORIDE SERPL-SCNC: 101 MMOL/L (ref 96–108)
CHOLEST SERPL-MCNC: 133 MG/DL
CO2 SERPL-SCNC: 29 MMOL/L (ref 21–32)
CREAT SERPL-MCNC: 0.93 MG/DL (ref 0.6–1.3)
EOSINOPHIL # BLD AUTO: 0.18 THOUSAND/ÂΜL (ref 0–0.61)
EOSINOPHIL NFR BLD AUTO: 3 % (ref 0–6)
ERYTHROCYTE [DISTWIDTH] IN BLOOD BY AUTOMATED COUNT: 12.4 % (ref 11.6–15.1)
EST. AVERAGE GLUCOSE BLD GHB EST-MCNC: 85 MG/DL
GFR SERPL CREATININE-BSD FRML MDRD: 116 ML/MIN/1.73SQ M
GLUCOSE P FAST SERPL-MCNC: 85 MG/DL (ref 65–99)
HBA1C MFR BLD: 4.6 %
HCT VFR BLD AUTO: 42.3 % (ref 36.5–49.3)
HDLC SERPL-MCNC: 41 MG/DL
HGB BLD-MCNC: 15.1 G/DL (ref 12–17)
IMM GRANULOCYTES # BLD AUTO: 0.01 THOUSAND/UL (ref 0–0.2)
IMM GRANULOCYTES NFR BLD AUTO: 0 % (ref 0–2)
LDLC SERPL CALC-MCNC: 71 MG/DL (ref 0–100)
LYMPHOCYTES # BLD AUTO: 1.82 THOUSANDS/ÂΜL (ref 0.6–4.47)
LYMPHOCYTES NFR BLD AUTO: 31 % (ref 14–44)
MCH RBC QN AUTO: 31.6 PG (ref 26.8–34.3)
MCHC RBC AUTO-ENTMCNC: 35.7 G/DL (ref 31.4–37.4)
MCV RBC AUTO: 89 FL (ref 82–98)
MONOCYTES # BLD AUTO: 0.48 THOUSAND/ÂΜL (ref 0.17–1.22)
MONOCYTES NFR BLD AUTO: 8 % (ref 4–12)
NEUTROPHILS # BLD AUTO: 3.45 THOUSANDS/ÂΜL (ref 1.85–7.62)
NEUTS SEG NFR BLD AUTO: 57 % (ref 43–75)
NONHDLC SERPL-MCNC: 92 MG/DL
NRBC BLD AUTO-RTO: 0 /100 WBCS
PLATELET # BLD AUTO: 247 THOUSANDS/UL (ref 149–390)
PMV BLD AUTO: 8.6 FL (ref 8.9–12.7)
POTASSIUM SERPL-SCNC: 3.8 MMOL/L (ref 3.5–5.3)
PROT SERPL-MCNC: 7.1 G/DL (ref 6.4–8.4)
RBC # BLD AUTO: 4.78 MILLION/UL (ref 3.88–5.62)
SODIUM SERPL-SCNC: 136 MMOL/L (ref 135–147)
TRIGL SERPL-MCNC: 105 MG/DL
TSH SERPL DL<=0.05 MIU/L-ACNC: 1.34 UIU/ML (ref 0.45–4.5)
WBC # BLD AUTO: 5.97 THOUSAND/UL (ref 4.31–10.16)

## 2024-08-15 PROCEDURE — 83036 HEMOGLOBIN GLYCOSYLATED A1C: CPT

## 2024-08-15 PROCEDURE — 84443 ASSAY THYROID STIM HORMONE: CPT

## 2024-08-15 PROCEDURE — 86003 ALLG SPEC IGE CRUDE XTRC EA: CPT

## 2024-08-15 PROCEDURE — 80061 LIPID PANEL: CPT

## 2024-08-15 PROCEDURE — 80053 COMPREHEN METABOLIC PANEL: CPT

## 2024-08-15 PROCEDURE — 36415 COLL VENOUS BLD VENIPUNCTURE: CPT

## 2024-08-15 PROCEDURE — 86618 LYME DISEASE ANTIBODY: CPT

## 2024-08-15 PROCEDURE — 86258 DGP ANTIBODY EACH IG CLASS: CPT

## 2024-08-15 PROCEDURE — 74018 RADEX ABDOMEN 1 VIEW: CPT

## 2024-08-15 PROCEDURE — 85025 COMPLETE CBC W/AUTO DIFF WBC: CPT

## 2024-08-15 PROCEDURE — 82248 BILIRUBIN DIRECT: CPT

## 2024-08-15 PROCEDURE — 86364 TISS TRNSGLTMNASE EA IG CLAS: CPT

## 2024-08-15 NOTE — TELEPHONE ENCOUNTER
Ruby called to F/U on form. As of now they do not have it but she is checking to be sure. Asking if faxed to [phone]. She will call back if they do not have it . Jazzy verify fax and if needs to be resent . Thank you

## 2024-08-16 LAB
B BURGDOR IGG+IGM SER QL IA: NEGATIVE
GLUTEN IGE QN: <0.1 KUA/I

## 2024-08-21 ENCOUNTER — APPOINTMENT (OUTPATIENT)
Dept: PHYSICAL THERAPY | Facility: CLINIC | Age: 24
End: 2024-08-21
Payer: COMMERCIAL

## 2024-08-21 LAB
GLIADIN IGG SER IA-ACNC: 2 UNITS (ref 0–19)
GLIADIN PEPTIDE IGG SER-ACNC: 1 UNITS (ref 0–19)
Lab: NORMAL
NOTE: NORMAL
TEST INFORMATION: NORMAL
TTG IGA SER-ACNC: <2 U/ML (ref 0–3)
WHEAT IGE QN: <0.1 KU/L

## 2024-08-26 ENCOUNTER — OFFICE VISIT (OUTPATIENT)
Dept: FAMILY MEDICINE CLINIC | Facility: CLINIC | Age: 24
End: 2024-08-26
Payer: COMMERCIAL

## 2024-08-26 VITALS
HEART RATE: 105 BPM | SYSTOLIC BLOOD PRESSURE: 110 MMHG | HEIGHT: 66 IN | TEMPERATURE: 98 F | RESPIRATION RATE: 18 BRPM | BODY MASS INDEX: 21.15 KG/M2 | DIASTOLIC BLOOD PRESSURE: 60 MMHG | OXYGEN SATURATION: 99 % | WEIGHT: 131.6 LBS

## 2024-08-26 DIAGNOSIS — K58.1 IRRITABLE BOWEL SYNDROME WITH CONSTIPATION: ICD-10-CM

## 2024-08-26 DIAGNOSIS — G89.29 CHRONIC BILATERAL LOW BACK PAIN WITHOUT SCIATICA: ICD-10-CM

## 2024-08-26 DIAGNOSIS — R17 ELEVATED BILIRUBIN: ICD-10-CM

## 2024-08-26 DIAGNOSIS — G54.0 THORACIC OUTLET SYNDROME: Primary | ICD-10-CM

## 2024-08-26 DIAGNOSIS — F41.8 ANXIETY ASSOCIATED WITH DEPRESSION: ICD-10-CM

## 2024-08-26 DIAGNOSIS — M54.50 CHRONIC BILATERAL LOW BACK PAIN WITHOUT SCIATICA: ICD-10-CM

## 2024-08-26 DIAGNOSIS — R35.0 URINARY FREQUENCY: ICD-10-CM

## 2024-08-26 LAB
SL AMB  POCT GLUCOSE, UA: NORMAL
SL AMB LEUKOCYTE ESTERASE,UA: NORMAL
SL AMB POCT BILIRUBIN,UA: NORMAL
SL AMB POCT BLOOD,UA: NORMAL
SL AMB POCT CLARITY,UA: CLEAR
SL AMB POCT COLOR,UA: YELLOW
SL AMB POCT KETONES,UA: NORMAL
SL AMB POCT NITRITE,UA: NORMAL
SL AMB POCT PH,UA: 6.5
SL AMB POCT SPECIFIC GRAVITY,UA: 1.02
SL AMB POCT URINE PROTEIN: NORMAL
SL AMB POCT UROBILINOGEN: NORMAL

## 2024-08-26 PROCEDURE — 99214 OFFICE O/P EST MOD 30 MIN: CPT | Performed by: STUDENT IN AN ORGANIZED HEALTH CARE EDUCATION/TRAINING PROGRAM

## 2024-08-26 PROCEDURE — 81002 URINALYSIS NONAUTO W/O SCOPE: CPT | Performed by: STUDENT IN AN ORGANIZED HEALTH CARE EDUCATION/TRAINING PROGRAM

## 2024-08-26 RX ORDER — IBUPROFEN 600 MG/1
600 TABLET, FILM COATED ORAL
COMMUNITY
Start: 2024-08-16 | End: 2024-08-26

## 2024-08-26 RX ORDER — SERTRALINE HYDROCHLORIDE 25 MG/1
1 TABLET, FILM COATED ORAL DAILY
COMMUNITY
Start: 2024-08-12 | End: 2024-09-11

## 2024-08-26 RX ORDER — ACETAMINOPHEN AND CODEINE PHOSPHATE 300; 30 MG/1; MG/1
1 TABLET ORAL
COMMUNITY
Start: 2024-08-16 | End: 2024-08-26

## 2024-08-26 NOTE — PROGRESS NOTES
Clinic Visit Note  Jay Ortiz 23 y.o. male   MRN: [id number]    Assessment and Plan      Diagnoses and all orders for this visit:    Thoracic outlet syndrome  Continue follow-up with cardiothoracic, recommending physical therapy at this time, spine/pain management referral given findings at PT.  -     Ambulatory referral to Spine & Pain Management; Future    Irritable bowel syndrome with constipation  Endoscopic evaluation planned for early September, continue appropriate dietary recommendations    Elevated bilirubin  Continue to monitor, likely genetic predisposition, asymptomatic    Urinary frequency  -     POCT urine dip  -     US kidney and bladder with pvr; Future    Anxiety associated with depression  Patient is doing well on Zoloft 25 mg daily, continue    Chronic bilateral low back pain without sciatica  -     XR spine lumbar 2 or 3 views injury; Future  -     Ambulatory referral to Spine & Pain Management; Future    Other orders  -     Discontinue: acetaminophen-codeine (TYLENOL with CODEINE #3) 300-30 MG per tablet; Take 1 tablet by mouth (Patient not taking: Reported on 8/26/2024)  -     Discontinue: ibuprofen (MOTRIN) 600 mg tablet; Take 600 mg by mouth (Patient not taking: Reported on 8/26/2024)  -     sertraline (ZOLOFT) 25 mg tablet; Take 1 tablet by mouth daily    My impressions and treatment recommendations were discussed in detail with the patient who verbalized understanding and had no further questions.  Discharge instructions were provided.    Subjective     Chief Complaint: F/U    History of Present Illness:    Patient is a pleasant 23-year-old male coming in for chronic disease follow-up, patient notes increased urinary frequency.    The following portions of the patient's history were reviewed and updated as appropriate: allergies, current medications, past family history, past medical history, past social history, past surgical history and problem list.    REVIEW OF SYSTEMS:  A  complete 12-point review of systems is negative other than that noted in the HPI.    Review of Systems   Constitutional:  Negative for chills, fatigue and fever.   HENT:  Negative for congestion and sore throat.    Eyes:  Negative for pain and visual disturbance.   Respiratory:  Negative for shortness of breath and wheezing.    Cardiovascular:  Negative for chest pain and palpitations.   Gastrointestinal:  Negative for abdominal pain, constipation, diarrhea, nausea and vomiting.   Genitourinary:  Positive for frequency. Negative for dysuria.   Musculoskeletal:  Negative for back pain and neck pain.   Skin:  Negative for color change and rash.   Neurological:  Negative for dizziness and headaches.   Psychiatric/Behavioral:  Negative for agitation and confusion.    All other systems reviewed and are negative.        Current Outpatient Medications:   •  sertraline (ZOLOFT) 25 mg tablet, Take 1 tablet by mouth daily, Disp: , Rfl:   •  polyethylene glycol (GOLYTELY) 4000 mL solution, Take 4,000 mL by mouth once for 1 dose, Disp: 4000 mL, Rfl: 0  Past Medical History:   Diagnosis Date   • ADHD    • ADHD (attention deficit hyperactivity disorder)    • Anxiety    • Anxiety    • Depression    • Depression      Past Surgical History:   Procedure Laterality Date   • ADENOIDECTOMY     • TONSILLECTOMY     • TYMPANOSTOMY TUBE PLACEMENT      removed     Social History     Socioeconomic History   • Marital status: Single     Spouse name: Not on file   • Number of children: Not on file   • Years of education: Not on file   • Highest education level: Not on file   Occupational History   • Not on file   Tobacco Use   • Smoking status: Never   • Smokeless tobacco: Never   Vaping Use   • Vaping status: Never Used   Substance and Sexual Activity   • Alcohol use: Not Currently   • Drug use: Not Currently     Types: Marijuana   • Sexual activity: Not on file   Other Topics Concern   • Not on file   Social History Narrative    ** Merged  "History Encounter **          Social Determinants of Health     Financial Resource Strain: Not on file   Food Insecurity: Not on file   Transportation Needs: Not on file   Physical Activity: Not on file   Stress: Not on file   Social Connections: Not on file   Intimate Partner Violence: Not on file   Housing Stability: Not on file     Family History   Problem Relation Age of Onset   • No Known Problems Mother    • Diabetes Father      Allergies   Allergen Reactions   • Actical GI Intolerance   • Gluten Meal - Food Allergy Other (See Comments)       Objective     Vitals:    08/26/24 0939   BP: 110/60   BP Location: Left arm   Patient Position: Sitting   Cuff Size: Standard   Pulse: 105   Resp: 18   Temp: 98 °F (36.7 °C)   TempSrc: Temporal   SpO2: 99%   Weight: 59.7 kg (131 lb 9.6 oz)   Height: 5' 5.5\" (1.664 m)       Physical Exam:     GENERAL: NAD, pleasant   HEENT:  NC/AT, PERRL, EOMI, no scleral icterus  CARDIAC:  RRR, +S1/S2, no S3/S4 appreciated, no m/g/r  PULMONARY:  CTA B/L, no wheezing/rales/rhonci, non-labored breathing  ABDOMEN:  Soft, NT/ND, no rebound/guarding/rigidity  Extremities:. No edema, cyanosis, or clubbing  Musculoskeletal:  Full range of motion intact in all extremities   NEUROLOGIC: Grossly intact, no focal deficits  SKIN:  No rashes or erythema noted on exposed skin  Psych: Normal affect, mood stable    ==  PLEASE NOTE:  This encounter was completed utilizing the [employer] Direct Speech Voice Recognition Software. Grammatical errors, random word insertions, pronoun errors and incomplete sentences are occasional consequences of the system due to software limitations, ambient noise and hardware issues.These may be missed by proof reading prior to affixing electronic signature. Any questions or concerns about the content, text or information contained within the body of this dictation should be directly addressed to the physician for clarification. Please do not hesitate to call me " directly if you have any any questions or concerns.    Low Zaidi, DO  [facility]   [facility]

## 2024-08-27 ENCOUNTER — ANESTHESIA EVENT (OUTPATIENT)
Dept: ANESTHESIOLOGY | Facility: HOSPITAL | Age: 24
End: 2024-08-27

## 2024-08-27 ENCOUNTER — ANESTHESIA (OUTPATIENT)
Dept: ANESTHESIOLOGY | Facility: HOSPITAL | Age: 24
End: 2024-08-27

## 2024-08-28 ENCOUNTER — OFFICE VISIT (OUTPATIENT)
Dept: PHYSICAL THERAPY | Facility: CLINIC | Age: 24
End: 2024-08-28
Payer: COMMERCIAL

## 2024-08-28 DIAGNOSIS — G54.0 THORACIC OUTLET SYNDROME: Primary | ICD-10-CM

## 2024-08-28 DIAGNOSIS — R20.1 IMPAIRED SENSATION: ICD-10-CM

## 2024-08-28 DIAGNOSIS — G44.86 CERVICOGENIC HEADACHE: ICD-10-CM

## 2024-08-28 PROCEDURE — 97140 MANUAL THERAPY 1/> REGIONS: CPT

## 2024-08-28 PROCEDURE — 97110 THERAPEUTIC EXERCISES: CPT

## 2024-08-28 NOTE — PROGRESS NOTES
Daily Note     Today's date: 2024  Patient name: Jay Ortiz  : 2000  MRN: [id number]  Referring provider: Dc Gibson*  Dx:   Encounter Diagnosis     ICD-10-CM    1. Thoracic outlet syndrome  G54.0       2. Cervicogenic headache  G44.86       3. Impaired sensation  R20.1           Start Time: 0845  Stop Time: 0930  Total time in clinic (min): 45 minutes    Subjective: Jay reports his brain fog and headaches have seemed to almost completely resolve after getting his wisdom teeth removed. Pt reports the numbness and weakness in his hand remains the same.      Objective: See treatment diary below    Mechanical Assessment & Dynamometry   R: 45 pre assessment   R: 45 after cervical retraction  R: 45 after cervical retraction & extension       Assessment: Tolerated treatment well. Pt demonstrated minimal change in  strength or numbness with repeated cervical retraction or cervical retraction with extension. Pt demonstrated significant soft tissue restrictions throughout cervical spine, upper trap, and pec minor. Introduced median nerve glides with instruction to implement at home for low repetitions to avoid over-irritation of nerves. Patient exhibited good technique with therapeutic exercises and would benefit from continued PT      Plan: Continue per plan of care.  Progress treatment as tolerated.       Precautions: frequent SOB and lightheadedness with laying flat        Insurance:  AMA/CMS Eval/ Re-eval Auth #/ Referral # Total units or visits Start date  Expiration date KX? Visit limitation?  PT only or  PT+OT? Co-Insurance   CMS 24 Not required        30% coinsurance                  POC Start Date POC Expiration Date Signed POC?   2024 pending      Date               Visits/Units:  Used               Authed:  Remaining                    Access Code: [email or web address]  URL: [email or web address]  Date: 2024  Prepared by: Katarzyna  Stecklow    Exercises  - Seated Cervical Retraction  - 1 x daily - 7 x weekly - 2 sets - 10 reps - 5sec hold  - Seated Thoracic Extension with Hands Behind Neck  - 1 x daily - 7 x weekly - 2 sets - 10 reps - 5sec hold (HOLD)  - Standing Median Nerve Glide  - 1 x daily - 7 x weekly - 2 sets - 5 reps  - Seated Scapular Retraction  - 1 x daily - 7 x weekly - 2 sets - 10 reps - 5sec hold  - Seated Upper Trapezius Stretch  - 1 x daily - 7 x weekly - 1 sets - 4 reps - 10-20s hold  - Seated Levator Scapulae Stretch  - 1 x daily - 7 x weekly - 1 sets - 4 reps - 10-20s hold    date  8/28 8/13 7/29   Visit number     1   Manuals       STM  Upper trap, pec minor, levator scap                           Neuro Re-Ed       Scap retraction     +postural cues 5x    Nerve glides  Median n 5x                                         Ther Ex       HEP  Updated  Cervical retraction w/ instruction Initiated     Education & management   Repeated motion assessment  w/  strength  Neurological testing completed, POC  Posture, TOS, possible MD referral    Cervical retraction    10x improved    Thoracic extension    5x                                Ther Activity                     Gait Training                     Modalities

## 2024-09-04 ENCOUNTER — APPOINTMENT (OUTPATIENT)
Dept: PHYSICAL THERAPY | Facility: CLINIC | Age: 24
End: 2024-09-04
Payer: COMMERCIAL

## 2024-09-04 ENCOUNTER — TELEPHONE (OUTPATIENT)
Dept: GASTROENTEROLOGY | Facility: AMBULARY SURGERY CENTER | Age: 24
End: 2024-09-04

## 2024-09-05 ENCOUNTER — OFFICE VISIT (OUTPATIENT)
Dept: PHYSICAL THERAPY | Facility: CLINIC | Age: 24
End: 2024-09-05
Payer: COMMERCIAL

## 2024-09-05 DIAGNOSIS — G44.86 CERVICOGENIC HEADACHE: ICD-10-CM

## 2024-09-05 DIAGNOSIS — R20.1 IMPAIRED SENSATION: ICD-10-CM

## 2024-09-05 DIAGNOSIS — G54.0 THORACIC OUTLET SYNDROME: Primary | ICD-10-CM

## 2024-09-05 PROCEDURE — 97112 NEUROMUSCULAR REEDUCATION: CPT

## 2024-09-05 PROCEDURE — 97110 THERAPEUTIC EXERCISES: CPT

## 2024-09-05 PROCEDURE — 97140 MANUAL THERAPY 1/> REGIONS: CPT

## 2024-09-05 NOTE — PROGRESS NOTES
Daily Note     Today's date: 2024  Patient name: Jay Ortiz  : 2000  MRN: [id number]  Referring provider: Dc Gibson*  Dx:   Encounter Diagnosis     ICD-10-CM    1. Thoracic outlet syndrome  G54.0       2. Cervicogenic headache  G44.86       3. Impaired sensation  R20.1           Start Time: 1100  Stop Time: 1145  Total time in clinic (min): 45 minutes    Subjective: Jay reports the numbness and weakness in his arm remains the same overall, however he does notice temporary improvement after stretching and nerve glides.       Objective: See treatment diary below      Assessment: Tolerated treatment well. Jay experienced slight increase in UE symptom intensity throughout session, however experienced centralization and reduced symptoms in his hand by end of session. Continued to emphasize stretching and nerve glides to tolerance. Patient demonstrated fatigue post treatment, exhibited good technique with therapeutic exercises, and would benefit from continued PT      Plan: Continue per plan of care.  Progress treatment as tolerated.       Precautions: frequent SOB and lightheadedness with laying flat        Insurance:  A/CMS Eval/ Re-eval Auth #/ Referral # Total units or visits Start date  Expiration date KX? Visit limitation?  PT only or  PT+OT? Co-Insurance   CMS 24 Not required        30% coinsurance                  POC Start Date POC Expiration Date Signed POC?   2024 pending      Date               Visits/Units:  Used               Authed:  Remaining                    Access Code: [email or web address]  URL: [email or web address]  Date: 2024  Prepared by: Katarzyna Tristan    Exercises  - Seated Cervical Retraction  - 1 x daily - 7 x weekly - 2 sets - 10 reps - 5sec hold  - Seated Thoracic Extension with Hands Behind Neck  - 1 x daily - 7 x weekly - 2 sets - 10 reps - 5sec hold (HOLD)  - Standing Median Nerve Glide  - 1 x daily - 7 x weekly - 2  sets - 5 reps  - Seated Scapular Retraction  - 1 x daily - 7 x weekly - 2 sets - 10 reps - 5sec hold  - Seated Upper Trapezius Stretch  - 1 x daily - 7 x weekly - 1 sets - 4 reps - 10-20s hold  - Seated Levator Scapulae Stretch  - 1 x daily - 7 x weekly - 1 sets - 4 reps - 10-20s hold    date 9/5 8/28 8/13 7/29   Visit number     1   Manuals       STM Upper trap, pec minor, levator scap  Upper trap, pec minor, levator scap                           Neuro Re-Ed       Scap retraction     +postural cues 5x    Nerve glides Median 10x PT assisted  Median n 5x      Horizontal abd YTB 10x                                   Ther Ex       HEP  Updated  Cervical retraction w/ instruction Initiated     Education & management   Repeated motion assessment  w/  strength  Neurological testing completed, POC  Posture, TOS, possible MD referral    Cervical retraction 15x   10x improved    Thoracic extension 15x   5x    W's on wall  15x                            Ther Activity                     Gait Training                     Modalities

## 2024-09-10 ENCOUNTER — ANESTHESIA (OUTPATIENT)
Dept: GASTROENTEROLOGY | Facility: AMBULARY SURGERY CENTER | Age: 24
End: 2024-09-10
Payer: COMMERCIAL

## 2024-09-10 ENCOUNTER — HOSPITAL ENCOUNTER (OUTPATIENT)
Dept: GASTROENTEROLOGY | Facility: AMBULARY SURGERY CENTER | Age: 24
Setting detail: OUTPATIENT SURGERY
Discharge: HOME | End: 2024-09-10
Attending: INTERNAL MEDICINE
Payer: COMMERCIAL

## 2024-09-10 VITALS
DIASTOLIC BLOOD PRESSURE: 62 MMHG | OXYGEN SATURATION: 100 % | TEMPERATURE: 97.8 F | HEART RATE: 61 BPM | RESPIRATION RATE: 16 BRPM | SYSTOLIC BLOOD PRESSURE: 92 MMHG

## 2024-09-10 DIAGNOSIS — K59.00 CONSTIPATION, UNSPECIFIED CONSTIPATION TYPE: ICD-10-CM

## 2024-09-10 DIAGNOSIS — K62.5 BRIGHT RED BLOOD PER RECTUM: ICD-10-CM

## 2024-09-10 PROCEDURE — 45380 COLONOSCOPY AND BIOPSY: CPT | Performed by: INTERNAL MEDICINE

## 2024-09-10 PROCEDURE — 88305 TISSUE EXAM BY PATHOLOGIST: CPT | Performed by: PATHOLOGY

## 2024-09-10 RX ORDER — LIDOCAINE HYDROCHLORIDE 10 MG/ML
INJECTION, SOLUTION EPIDURAL; INFILTRATION; INTRACAUDAL; PERINEURAL AS NEEDED
Status: DISCONTINUED | OUTPATIENT
Start: 2024-09-10 | End: 2024-09-10

## 2024-09-10 RX ORDER — ONDANSETRON 2 MG/ML
4 INJECTION INTRAMUSCULAR; INTRAVENOUS ONCE AS NEEDED
Status: CANCELLED | OUTPATIENT
Start: 2024-09-10

## 2024-09-10 RX ORDER — PROPOFOL 10 MG/ML
INJECTION, EMULSION INTRAVENOUS AS NEEDED
Status: DISCONTINUED | OUTPATIENT
Start: 2024-09-10 | End: 2024-09-10

## 2024-09-10 RX ORDER — SODIUM CHLORIDE, SODIUM LACTATE, POTASSIUM CHLORIDE, CALCIUM CHLORIDE 600; 310; 30; 20 MG/100ML; MG/100ML; MG/100ML; MG/100ML
INJECTION, SOLUTION INTRAVENOUS CONTINUOUS PRN
Status: DISCONTINUED | OUTPATIENT
Start: 2024-09-10 | End: 2024-09-10

## 2024-09-10 RX ADMIN — PROPOFOL 100 MG: 10 INJECTION, EMULSION INTRAVENOUS at 08:54

## 2024-09-10 RX ADMIN — PROPOFOL 50 MG: 10 INJECTION, EMULSION INTRAVENOUS at 08:55

## 2024-09-10 RX ADMIN — PROPOFOL 100 MG: 10 INJECTION, EMULSION INTRAVENOUS at 08:50

## 2024-09-10 RX ADMIN — LIDOCAINE HYDROCHLORIDE 50 MG: 10 INJECTION, SOLUTION EPIDURAL; INFILTRATION; INTRACAUDAL; PERINEURAL at 08:50

## 2024-09-10 RX ADMIN — SODIUM CHLORIDE, SODIUM LACTATE, POTASSIUM CHLORIDE, AND CALCIUM CHLORIDE: .6; .31; .03; .02 INJECTION, SOLUTION INTRAVENOUS at 08:45

## 2024-09-10 RX ADMIN — PROPOFOL 50 MG: 10 INJECTION, EMULSION INTRAVENOUS at 08:52

## 2024-09-10 NOTE — ANESTHESIA PREPROCEDURE EVALUATION
Procedure:  COLONOSCOPY    Relevant Problems   ANESTHESIA (within normal limits)      CARDIO (within normal limits)      ENDO (within normal limits)      PULMONARY   (+) Pneumothorax on right        Physical Exam    Airway    Mallampati score: II  TM Distance: >3 FB  Neck ROM: full     Dental   No notable dental hx     Cardiovascular  Rhythm: regular, Rate: normal    Pulmonary  Pulmonary exam normal     Other Findings        Anesthesia Plan  ASA Score- 2     Anesthesia Type- IV sedation with anesthesia with ASA Monitors.         Additional Monitors:     Airway Plan:            Plan Factors-Exercise tolerance (METS): >4 METS.    Chart reviewed.    Patient summary reviewed.                  Induction-     Postoperative Plan-         Informed Consent- Anesthetic plan and risks discussed with patient.  I personally reviewed this patient with the CRNA. Discussed and agreed on the Anesthesia Plan with the CRNA..

## 2024-09-10 NOTE — ANESTHESIA POSTPROCEDURE EVALUATION
Post-Op Assessment Note    CV Status:  Stable  Pain Score: 0    Pain management: adequate       Mental Status:  Sleepy   Hydration Status:  Stable   PONV Controlled:  None   Airway Patency:  Patent     Post Op Vitals Reviewed: Yes    No anethesia notable event occurred.    Staff: CRNA               BP   90/52   Temp      Pulse  74   Resp   20   SpO2   97

## 2024-09-10 NOTE — H&P
History and Physical -  [facility]  Jay Ortiz 24 y.o. male MRN: [id number]                  HPI: Jay Ortiz is a 24 y.o. year old male who presents for rectal bleeding.      REVIEW OF SYSTEMS: Per the HPI, and otherwise unremarkable.    Historical Information   Past Medical History:   Diagnosis Date    ADHD     ADHD (attention deficit hyperactivity disorder)     Anxiety     Anxiety     Depression     Depression      Past Surgical History:   Procedure Laterality Date    ADENOIDECTOMY      TONSILLECTOMY      TYMPANOSTOMY TUBE PLACEMENT      removed     Social History   Social History     Substance and Sexual Activity   Alcohol Use Not Currently     Social History     Substance and Sexual Activity   Drug Use Not Currently    Types: Marijuana     Social History     Tobacco Use   Smoking Status Never   Smokeless Tobacco Never     Family History   Problem Relation Age of Onset    No Known Problems Mother     Diabetes Father        Meds/Allergies       Current Outpatient Medications:     sertraline (ZOLOFT) 25 mg tablet    polyethylene glycol (GOLYTELY) 4000 mL solution    Allergies   Allergen Reactions    Actical GI Intolerance    Gluten Meal - Food Allergy Other (See Comments)       Objective     /64   Pulse 70   Temp 97.8 °F (36.6 °C) (Temporal)   Resp 16   SpO2 98%       PHYSICAL EXAM    Gen: NAD  Head: NCAT  CV: RRR  CHEST: Clear  ABD: soft, NT/ND  EXT: no edema      ASSESSMENT/PLAN:  This is a 24 y.o. year old male here for colonoscopy, and he is stable and optimized for his procedure.

## 2024-09-11 ENCOUNTER — APPOINTMENT (OUTPATIENT)
Dept: PHYSICAL THERAPY | Facility: CLINIC | Age: 24
End: 2024-09-11
Payer: COMMERCIAL

## 2024-09-13 ENCOUNTER — HOSPITAL ENCOUNTER (OUTPATIENT)
Dept: RADIOLOGY | Facility: HOSPITAL | Age: 24
Discharge: HOME | End: 2024-09-13
Attending: STUDENT IN AN ORGANIZED HEALTH CARE EDUCATION/TRAINING PROGRAM
Payer: COMMERCIAL

## 2024-09-13 DIAGNOSIS — R35.0 URINARY FREQUENCY: ICD-10-CM

## 2024-09-13 PROCEDURE — 76770 US EXAM ABDO BACK WALL COMP: CPT

## 2024-09-16 PROCEDURE — 88305 TISSUE EXAM BY PATHOLOGIST: CPT | Performed by: PATHOLOGY

## 2024-09-20 ENCOUNTER — TELEPHONE (OUTPATIENT)
Dept: FAMILY MEDICINE CLINIC | Facility: CLINIC | Age: 24
End: 2024-09-20

## 2024-09-20 DIAGNOSIS — N42.9 PROSTATE DISORDER: ICD-10-CM

## 2024-09-20 DIAGNOSIS — R35.0 URINARY FREQUENCY: Primary | ICD-10-CM

## 2024-09-20 NOTE — TELEPHONE ENCOUNTER
----- Message from Low Zaidi DO sent at 9/20/2024  6:53 AM EDT -----  Please let patient know that ultrasound bladder/kidney overall unremarkable, there was a central prostatic calcification, read as benign but would still like patient to follow-up with urology for evaluation on urological symptoms.  Referral has been placed, they should be calling patient but please give him an update.
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
## 2024-09-23 ENCOUNTER — OFFICE VISIT (OUTPATIENT)
Dept: CARDIAC SURGERY | Facility: CLINIC | Age: 24
End: 2024-09-23
Payer: COMMERCIAL

## 2024-09-23 VITALS
HEART RATE: 66 BPM | BODY MASS INDEX: 21.12 KG/M2 | OXYGEN SATURATION: 99 % | DIASTOLIC BLOOD PRESSURE: 81 MMHG | WEIGHT: 131.39 LBS | HEIGHT: 66 IN | RESPIRATION RATE: 14 BRPM | SYSTOLIC BLOOD PRESSURE: 119 MMHG | TEMPERATURE: 98.1 F

## 2024-09-23 DIAGNOSIS — G54.0 THORACIC OUTLET SYNDROME: Primary | ICD-10-CM

## 2024-09-23 PROCEDURE — 99213 OFFICE O/P EST LOW 20 MIN: CPT | Performed by: SURGERY

## 2024-09-23 RX ORDER — TRAZODONE HYDROCHLORIDE 50 MG/1
50 TABLET, FILM COATED ORAL
COMMUNITY
Start: 2024-09-17 | End: 2024-12-16
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
## 2024-09-25 ENCOUNTER — APPOINTMENT (OUTPATIENT)
Dept: PHYSICAL THERAPY | Facility: CLINIC | Age: 24
End: 2024-09-25
Payer: COMMERCIAL

## 2024-09-27 ENCOUNTER — EVALUATION (OUTPATIENT)
Dept: PHYSICAL THERAPY | Facility: CLINIC | Age: 24
End: 2024-09-27
Payer: COMMERCIAL

## 2024-09-27 DIAGNOSIS — G44.86 CERVICOGENIC HEADACHE: ICD-10-CM

## 2024-09-27 DIAGNOSIS — R20.1 IMPAIRED SENSATION: ICD-10-CM

## 2024-09-27 DIAGNOSIS — G54.0 THORACIC OUTLET SYNDROME: Primary | ICD-10-CM

## 2024-09-27 PROCEDURE — 97112 NEUROMUSCULAR REEDUCATION: CPT

## 2024-09-27 PROCEDURE — 97110 THERAPEUTIC EXERCISES: CPT

## 2024-09-27 PROCEDURE — 97140 MANUAL THERAPY 1/> REGIONS: CPT
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
## 2024-10-02 ENCOUNTER — OFFICE VISIT (OUTPATIENT)
Dept: PHYSICAL THERAPY | Facility: CLINIC | Age: 24
End: 2024-10-02
Payer: COMMERCIAL

## 2024-10-02 DIAGNOSIS — R20.1 IMPAIRED SENSATION: ICD-10-CM

## 2024-10-02 DIAGNOSIS — G54.0 THORACIC OUTLET SYNDROME: Primary | ICD-10-CM

## 2024-10-02 DIAGNOSIS — G44.86 CERVICOGENIC HEADACHE: ICD-10-CM

## 2024-10-02 PROCEDURE — 97140 MANUAL THERAPY 1/> REGIONS: CPT

## 2024-10-02 NOTE — PROGRESS NOTES
Daily Note     Today's date: 10/2/2024  Patient name: Jay Ortiz  : 2000  MRN: [id number]  Referring provider: Dc Gibson*  Dx:   Encounter Diagnosis     ICD-10-CM    1. Thoracic outlet syndrome  G54.0       2. Cervicogenic headache  G44.86       3. Impaired sensation  R20.1           Start Time: 1230  Stop Time: 1315  Total time in clinic (min): 45 minutes    Subjective: Jay reports his symptoms are about the same. Pt states his upper trap region was very sore after previous session.      Objective: See treatment diary below      Assessment: Tolerated treatment well. Performed gentle suboccipital release today and cervical STM with pt tolerating well and experiencing some improvement in forearm numbness. Patient would benefit from continued PT      Plan: Continue per plan of care.      Precautions: frequent SOB and lightheadedness with laying flat        Insurance:  A/CMS Eval/ Re-eval Auth #/ Referral # Total units or visits Start date  Expiration date KX? Visit limitation?  PT only or  PT+OT? Co-Insurance   CMS 24 Not required        30% coinsurance                  POC Start Date POC Expiration Date Signed POC?   2024 pending   2024       Date               Visits/Units:  Used               Authed:  Remaining                    Access Code: [email or web address]  URL: [email or web address]  Date: 2024  Prepared by: Katarzyna Tristan    Exercises  - Seated Cervical Retraction  - 1 x daily - 7 x weekly - 2 sets - 10 reps - 5sec hold  - Seated Thoracic Extension with Hands Behind Neck  - 1 x daily - 7 x weekly - 2 sets - 10 reps - 5sec hold (HOLD)  - Standing Median Nerve Glide  - 1 x daily - 7 x weekly - 2 sets - 5 reps  - Seated Scapular Retraction  - 1 x daily - 7 x weekly - 2 sets - 10 reps - 5sec hold  - Seated Upper Trapezius Stretch  - 1 x daily - 7 x weekly - 1 sets - 4 reps - 10-20s hold  - Seated Levator Scapulae Stretch  - 1 x  daily - 7 x weekly - 1 sets - 4 reps - 10-20s hold    date 10/2 9/27 9/5 8/28 8/13 7/29   Visit number  6 5 4 3 2 1   Manuals         STM Suboccipital, UT Upper trap, levator scap Upper trap, pec minor, levator scap  Upper trap, pec minor, levator scap                                 Neuro Re-Ed         Scap retraction       +postural cues 5x    Nerve glides Median n PT assisted 2x10  Medial n PT assisted 2x10  Median 10x PT assisted  Median n 5x      Horizontal abd   YTB 10x                                           Ther Ex         HEP  Re-eval   Updated  Cervical retraction w/ instruction Initiated     Education & management   POC  Repeated motion assessment  w/  strength  Neurological testing completed, POC  Posture, TOS, possible MD referral    Cervical retraction   15x   10x improved    Thoracic extension   15x   5x    W's on wall    15x                                  Ther Activity                           Gait Training                           Modalities

## 2024-10-09 ENCOUNTER — OFFICE VISIT (OUTPATIENT)
Dept: PHYSICAL THERAPY | Facility: CLINIC | Age: 24
End: 2024-10-09
Payer: COMMERCIAL

## 2024-10-09 DIAGNOSIS — G44.86 CERVICOGENIC HEADACHE: ICD-10-CM

## 2024-10-09 DIAGNOSIS — G54.0 THORACIC OUTLET SYNDROME: Primary | ICD-10-CM

## 2024-10-09 DIAGNOSIS — R20.1 IMPAIRED SENSATION: ICD-10-CM

## 2024-10-09 PROCEDURE — 97140 MANUAL THERAPY 1/> REGIONS: CPT

## 2024-10-09 PROCEDURE — 97112 NEUROMUSCULAR REEDUCATION: CPT

## 2024-10-09 NOTE — PROGRESS NOTES
Daily Note     Today's date: 10/9/2024  Patient name: Jay Ortiz  : 2000  MRN: [id number]  Referring provider: Dc Gibson*  Dx:   Encounter Diagnosis     ICD-10-CM    1. Thoracic outlet syndrome  G54.0       2. Cervicogenic headache  G44.86       3. Impaired sensation  R20.1           Start Time: 1230  Stop Time: 1315  Total time in clinic (min): 45 minutes    Subjective: Jay reports his symptoms are the same overall and he is scheduled to follow up with thoracic surgeon on  to determine next steps leading up to surgery. Pt reports the reduction of forearm numbness after previous session only lasted a few hours.      Objective: See treatment diary below      Assessment: Tolerated treatment well.  Pt continues to demonstrate significant soft tissue tension and tenderness throughout cervical and upper thoracic region, L>R, and frequently experiences reproduction of peripheral symptoms with STM. Pt tolerated gentle 1st rib mobilizations today and showed some improvement in neural tension by end of session following manual intervention. Patient exhibited good technique with therapeutic exercises and would benefit from continued PT      Plan: Continue per plan of care.  Progress treatment as tolerated.       Precautions: frequent SOB and lightheadedness with laying flat        Insurance:  AMA/CMS Eval/ Re-eval Auth #/ Referral # Total units or visits Start date  Expiration date KX? Visit limitation?  PT only or  PT+OT? Co-Insurance   CMS 24 Not required        30% coinsurance                  POC Start Date POC Expiration Date Signed POC?   2024 pending   2024       Date               Visits/Units:  Used               Authed:  Remaining                    Access Code: [email or web address]  URL: [email or web address]  Date: 2024  Prepared by: Katarzyna Tristan    Exercises  - Seated Cervical Retraction  - 1 x daily - 7 x weekly - 2 sets -  10 reps - 5sec hold  - Seated Thoracic Extension with Hands Behind Neck  - 1 x daily - 7 x weekly - 2 sets - 10 reps - 5sec hold (HOLD)  - Standing Median Nerve Glide  - 1 x daily - 7 x weekly - 2 sets - 5 reps  - Seated Scapular Retraction  - 1 x daily - 7 x weekly - 2 sets - 10 reps - 5sec hold  - Seated Upper Trapezius Stretch  - 1 x daily - 7 x weekly - 1 sets - 4 reps - 10-20s hold  - Seated Levator Scapulae Stretch  - 1 x daily - 7 x weekly - 1 sets - 4 reps - 10-20s hold    date 10/9 10/2 9/27 9/5 8/28 8/13 7/29   Visit number  7 6 5 4 3 2 1   Manuals          STM Suboccipital, UT, levator scap, pec minor  Suboccipital, UT Upper trap, levator scap Upper trap, pec minor, levator scap  Upper trap, pec minor, levator scap      Scapular mobs Gentle posterior tilt and retraction          1st rib mob Gentle                    Neuro Re-Ed          Scap retraction        +postural cues 5x    Nerve glides Median n PT assisted 2x10  Median n PT assisted 2x10  Medial n PT assisted 2x10  Median 10x PT assisted  Median n 5x      Horizontal abd    YTB 10x                                               Ther Ex          HEP   Re-eval   Updated  Cervical retraction w/ instruction Initiated     Education & management    POC  Repeated motion assessment  w/  strength  Neurological testing completed, POC  Posture, TOS, possible MD referral    Cervical retraction 15x   15x   10x improved    Thoracic extension    15x   5x    W's on wall     15x                                     Ther Activity                              Gait Training                              Modalities

## 2024-10-16 ENCOUNTER — APPOINTMENT (OUTPATIENT)
Dept: PHYSICAL THERAPY | Facility: CLINIC | Age: 24
End: 2024-10-16
Payer: COMMERCIAL

## 2024-10-21 ENCOUNTER — TELEPHONE (OUTPATIENT)
Age: 24
End: 2024-10-21

## 2024-10-21 DIAGNOSIS — F41.8 ANXIETY ASSOCIATED WITH DEPRESSION: Primary | ICD-10-CM

## 2024-10-21 RX ORDER — TRAZODONE HYDROCHLORIDE 50 MG/1
50 TABLET, FILM COATED ORAL
Qty: 30 TABLET | Refills: 2 | Status: SHIPPED | OUTPATIENT
Start: 2024-10-21 | End: 2025-01-19

## 2024-10-21 NOTE — TELEPHONE ENCOUNTER
Patient called asking if PCP is able to prescribe his medications Zoloft and Trazodone until he is able to get in with a psychiatrist. Please advise.

## 2024-10-22 NOTE — TELEPHONE ENCOUNTER
Patient called back and was advised of 's message.    Low Zaidi, DO   to [facility]       10/21/24  2:28 PM  Please let patient know that I have sent in medication to pharmacy.

## 2024-10-23 ENCOUNTER — OFFICE VISIT (OUTPATIENT)
Dept: PHYSICAL THERAPY | Facility: CLINIC | Age: 24
End: 2024-10-23
Payer: COMMERCIAL

## 2024-10-23 DIAGNOSIS — G44.86 CERVICOGENIC HEADACHE: ICD-10-CM

## 2024-10-23 DIAGNOSIS — R20.1 IMPAIRED SENSATION: ICD-10-CM

## 2024-10-23 DIAGNOSIS — G54.0 THORACIC OUTLET SYNDROME: Primary | ICD-10-CM

## 2024-10-23 PROCEDURE — 97140 MANUAL THERAPY 1/> REGIONS: CPT

## 2024-10-23 PROCEDURE — 97110 THERAPEUTIC EXERCISES: CPT

## 2024-10-23 NOTE — PROGRESS NOTES
Daily Note     Today's date: 10/23/2024  Patient name: Jay Ortiz  : 2000  MRN: [id number]  Referring provider: Dc Gibson*  Dx:   Encounter Diagnosis     ICD-10-CM    1. Thoracic outlet syndrome  G54.0       2. Cervicogenic headache  G44.86       3. Impaired sensation  R20.1           Start Time: 1230  Stop Time: 1315  Total time in clinic (min): 45 minutes    Subjective: Jay reports his UE symptoms remain the same, however he notices his LE symptoms seem to be increasing a bit. Pt reports he continues to notice decreased sensation when urinating, however denies any retention. Pt states he has a follow-up with the thoracic surgeon on Friday.       Objective: See treatment diary below    LE DTRs   L4 Patellar: normal 2+, remained normal with repeated testing     No evidence of clonus     Assessment: Tolerated treatment well. Pt continues with significant soft tissue restrictions and tenderness throughout L UT and levator scap. Discussed pt's symptoms at length today. Given persistent bilateral symptoms of both upper and lower extremities, as well as lack of sensation with urination, expressed concern for possible spinal cord involvement and recommended pt follow up with neurologist. Advised pt to speak with thoracic surgeon during follow up on Friday regarding all symptoms to potentially be referred for spinal imaging. No evidence of hypo or hyperactive reflexes, as well as no evidence of LE clonus with testing today. Patient would benefit from continued PT      Plan: Continue per plan of care.  Progress treatment as tolerated.       Precautions: frequent SOB and lightheadedness with laying flat        Insurance:  AMA/CMS Eval/ Re-eval Auth #/ Referral # Total units or visits Start date  Expiration date KX? Visit limitation?  PT only or  PT+OT? Co-Insurance   CMS 24 Not required        30% coinsurance                  POC Start Date POC Expiration Date Signed POC?   2024  9/23/2024 pending   9/27/2024 11/22/2024       Date               Visits/Units:  Used               Authed:  Remaining                    Access Code: [email or web address]  URL: [email or web address]  Date: 08/28/2024  Prepared by: Katarzyna Tristan    Exercises  - Seated Cervical Retraction  - 1 x daily - 7 x weekly - 2 sets - 10 reps - 5sec hold  - Seated Thoracic Extension with Hands Behind Neck  - 1 x daily - 7 x weekly - 2 sets - 10 reps - 5sec hold (HOLD)  - Standing Median Nerve Glide  - 1 x daily - 7 x weekly - 2 sets - 5 reps  - Seated Scapular Retraction  - 1 x daily - 7 x weekly - 2 sets - 10 reps - 5sec hold  - Seated Upper Trapezius Stretch  - 1 x daily - 7 x weekly - 1 sets - 4 reps - 10-20s hold  - Seated Levator Scapulae Stretch  - 1 x daily - 7 x weekly - 1 sets - 4 reps - 10-20s hold    date 10/23 10/9 10/2 9/27 9/5 8/28 8/13 7/29   Visit number  8 7 6 5 4 3 2 1   Manuals           STM Suboccipital, UT, levator scap Suboccipital, UT, levator scap, pec minor  Suboccipital, UT Upper trap, levator scap Upper trap, pec minor, levator scap  Upper trap, pec minor, levator scap      Scapular mobs  Gentle posterior tilt and retraction          1st rib mob  Gentle                     Neuro Re-Ed           Scap retraction         +postural cues 5x    Nerve glides  Median n PT assisted 2x10  Median n PT assisted 2x10  Medial n PT assisted 2x10  Median 10x PT assisted  Median n 5x      Horizontal abd     YTB 10x                                                   Ther Ex           HEP    Re-eval   Updated  Cervical retraction w/ instruction Initiated     Education & management  LE reflexes, clonus testing, recommendation to follow up with neurologist    POC  Repeated motion assessment  w/  strength  Neurological testing completed, POC  Posture, TOS, possible MD referral    Cervical retraction  15x   15x   10x improved    Thoracic extension     15x   5x    W's on wall      15x                                         Ther Activity                                 Gait Training                                 Modalities

## 2024-10-25 ENCOUNTER — APPOINTMENT (OUTPATIENT)
Dept: RADIOLOGY | Facility: CLINIC | Age: 24
End: 2024-10-25
Payer: COMMERCIAL

## 2024-10-25 ENCOUNTER — OFFICE VISIT (OUTPATIENT)
Dept: FAMILY MEDICINE CLINIC | Facility: CLINIC | Age: 24
End: 2024-10-25
Payer: COMMERCIAL

## 2024-10-25 VITALS
HEIGHT: 66 IN | BODY MASS INDEX: 17.84 KG/M2 | TEMPERATURE: 98.1 F | WEIGHT: 111 LBS | OXYGEN SATURATION: 99 % | RESPIRATION RATE: 16 BRPM | DIASTOLIC BLOOD PRESSURE: 72 MMHG | SYSTOLIC BLOOD PRESSURE: 134 MMHG | HEART RATE: 97 BPM

## 2024-10-25 DIAGNOSIS — M54.50 CHRONIC BILATERAL LOW BACK PAIN WITHOUT SCIATICA: Primary | ICD-10-CM

## 2024-10-25 DIAGNOSIS — K58.1 IRRITABLE BOWEL SYNDROME WITH CONSTIPATION: ICD-10-CM

## 2024-10-25 DIAGNOSIS — F41.8 ANXIETY ASSOCIATED WITH DEPRESSION: ICD-10-CM

## 2024-10-25 DIAGNOSIS — G89.29 CHRONIC BILATERAL LOW BACK PAIN WITHOUT SCIATICA: ICD-10-CM

## 2024-10-25 DIAGNOSIS — G54.0 THORACIC OUTLET SYNDROME: ICD-10-CM

## 2024-10-25 DIAGNOSIS — M54.50 CHRONIC BILATERAL LOW BACK PAIN WITHOUT SCIATICA: ICD-10-CM

## 2024-10-25 DIAGNOSIS — N42.9 PROSTATE DISORDER: ICD-10-CM

## 2024-10-25 DIAGNOSIS — G89.29 CHRONIC BILATERAL LOW BACK PAIN WITHOUT SCIATICA: Primary | ICD-10-CM

## 2024-10-25 PROCEDURE — 72100 X-RAY EXAM L-S SPINE 2/3 VWS: CPT

## 2024-10-25 PROCEDURE — 99214 OFFICE O/P EST MOD 30 MIN: CPT | Performed by: STUDENT IN AN ORGANIZED HEALTH CARE EDUCATION/TRAINING PROGRAM

## 2024-10-25 NOTE — PROGRESS NOTES
Clinic Visit Note  Jay Ortiz 24 y.o. male   MRN: [id number]    Assessment and Plan      Diagnoses and all orders for this visit:    Chronic bilateral low back pain without sciatica    Irritable bowel syndrome with constipation    Thoracic outlet syndrome    Anxiety associated with depression    Prostate disorder      Patient is a pleasant 24-year-old male coming in for follow-up on chronic disease management, following closely with thoracic surgery team, appreciate surgical recommendations.  Patient notes that GI symptoms have improved, following closely with gastroenterology.  Chronic bilateral lower back pain without sciatica, recommend follow-up x-ray imaging, has not tolerated physical therapy appropriately, will likely need MRI future.  Patient will also be following with urology, prostate disorder.  Continue appropriate follow-up with specialist for definitive management.    My impressions and treatment recommendations were discussed in detail with the patient who verbalized understanding and had no further questions.  Discharge instructions were provided.    Subjective     Chief Complaint: F/U    History of Present Illness:    Follow up chronic disease management.     The following portions of the patient's history were reviewed and updated as appropriate: allergies, current medications, past family history, past medical history, past social history, past surgical history and problem list.    REVIEW OF SYSTEMS:  A complete 12-point review of systems is negative other than that noted in the HPI.    Review of Systems   Constitutional:  Positive for fatigue. Negative for chills and fever.   HENT:  Negative for congestion and sore throat.    Eyes:  Negative for pain and visual disturbance.   Respiratory:  Negative for cough, shortness of breath and wheezing.    Cardiovascular:  Negative for chest pain and palpitations.   Gastrointestinal:  Negative for abdominal pain, constipation, diarrhea, nausea and  vomiting.   Genitourinary:  Negative for dysuria and frequency.   Musculoskeletal:  Negative for back pain and neck pain.   Skin:  Negative for color change and rash.   Neurological:  Negative for dizziness and headaches.   Psychiatric/Behavioral:  Negative for agitation and confusion.    All other systems reviewed and are negative.        Current Outpatient Medications:     sertraline (ZOLOFT) 50 mg tablet, Take 1 tablet (50 mg total) by mouth in the morning, Disp: 30 tablet, Rfl: 2    traZODone (DESYREL) 50 mg tablet, Take 1 tablet (50 mg total) by mouth daily at bedtime, Disp: 30 tablet, Rfl: 2  Past Medical History:   Diagnosis Date    ADHD     ADHD (attention deficit hyperactivity disorder)     Anxiety     Anxiety     Depression     Depression      Past Surgical History:   Procedure Laterality Date    ADENOIDECTOMY      TONSILLECTOMY      TYMPANOSTOMY TUBE PLACEMENT      removed     Social History     Socioeconomic History    Marital status: Single     Spouse name: Not on file    Number of children: Not on file    Years of education: Not on file    Highest education level: Not on file   Occupational History    Not on file   Tobacco Use    Smoking status: Never    Smokeless tobacco: Never   Vaping Use    Vaping status: Never Used   Substance and Sexual Activity    Alcohol use: Not Currently    Drug use: Not Currently     Types: Marijuana    Sexual activity: Not on file   Other Topics Concern    Not on file   Social History Narrative    ** Merged History Encounter **          Social Determinants of Health     Financial Resource Strain: Not on file   Food Insecurity: Not on file   Transportation Needs: Not on file   Physical Activity: Not on file   Stress: Not on file   Social Connections: Not on file   Intimate Partner Violence: Not on file   Housing Stability: Not on file     Family History   Problem Relation Age of Onset    No Known Problems Mother     Diabetes Father      Allergies   Allergen Reactions     "Actical GI Intolerance    Gluten Meal - Food Allergy Other (See Comments)       Objective     Vitals:    10/25/24 0839   BP: 134/72   BP Location: Left arm   Patient Position: Sitting   Cuff Size: Standard   Pulse: 97   Resp: 16   Temp: 98.1 °F (36.7 °C)   SpO2: 99%   Weight: 50.3 kg (111 lb)   Height: 5' 5.5\" (1.664 m)       Physical Exam:     GENERAL: NAD, pleasant   HEENT:  NC/AT, PERRL, EOMI, no scleral icterus  CARDIAC:  RRR, +S1/S2, no S3/S4 appreciated, no m/g/r  PULMONARY:  CTA B/L, no wheezing/rales/rhonci, non-labored breathing  ABDOMEN:  Soft, NT/ND, no rebound/guarding/rigidity  Extremities:. No edema, cyanosis, or clubbing  Musculoskeletal:  Full range of motion intact in all extremities   NEUROLOGIC: Grossly intact, no focal deficits  SKIN:  No rashes or erythema noted on exposed skin  Psych: Normal affect, mood stable    ==  PLEASE NOTE:  This encounter was completed utilizing the [employer] Direct Speech Voice Recognition Software. Grammatical errors, random word insertions, pronoun errors and incomplete sentences are occasional consequences of the system due to software limitations, ambient noise and hardware issues.These may be missed by proof reading prior to affixing electronic signature. Any questions or concerns about the content, text or information contained within the body of this dictation should be directly addressed to the physician for clarification. Please do not hesitate to call me directly if you have any any questions or concerns.    Low Zaidi, DO  [facility]   [facility] [place]     "

## 2024-10-28 ENCOUNTER — TELEPHONE (OUTPATIENT)
Dept: FAMILY MEDICINE CLINIC | Facility: CLINIC | Age: 24
End: 2024-10-28

## 2024-10-28 DIAGNOSIS — M54.50 CHRONIC BILATERAL LOW BACK PAIN WITHOUT SCIATICA: ICD-10-CM

## 2024-10-28 DIAGNOSIS — M48.07 SPINAL STENOSIS OF LUMBOSACRAL REGION: Primary | ICD-10-CM

## 2024-10-28 DIAGNOSIS — G89.29 CHRONIC BILATERAL LOW BACK PAIN WITHOUT SCIATICA: ICD-10-CM

## 2024-10-29 ENCOUNTER — OFFICE VISIT (OUTPATIENT)
Dept: PHYSICAL THERAPY | Facility: CLINIC | Age: 24
End: 2024-10-29
Payer: COMMERCIAL

## 2024-10-29 DIAGNOSIS — G44.86 CERVICOGENIC HEADACHE: ICD-10-CM

## 2024-10-29 DIAGNOSIS — R20.1 IMPAIRED SENSATION: ICD-10-CM

## 2024-10-29 DIAGNOSIS — G54.0 THORACIC OUTLET SYNDROME: Primary | ICD-10-CM

## 2024-10-29 PROCEDURE — 97110 THERAPEUTIC EXERCISES: CPT

## 2024-10-29 PROCEDURE — 97112 NEUROMUSCULAR REEDUCATION: CPT

## 2024-10-29 NOTE — PROGRESS NOTES
Daily Note     Today's date: 10/29/2024  Patient name: Jay Ortiz  : 2000  MRN: [id number]  Referring provider: Dc Gibson*  Dx:   Encounter Diagnosis     ICD-10-CM    1. Thoracic outlet syndrome  G54.0       2. Cervicogenic headache  G44.86       3. Impaired sensation  R20.1           Start Time: 1145  Stop Time: 1230  Total time in clinic (min): 45 minutes    Subjective: Jay reports his symptoms are the same overall, however his low back pain has been a bit worse recently.       Objective: See treatment diary below      Assessment: Tolerated treatment well. Focused on postural strengthening and endurance today with pt requiring frequent rest breaks due to UE fatigue and increase in symptoms. Dicussed holding PT until after further imaging is completed and post-operatively once appropriate to resume with pt in agreement.       Plan: Hold PT until further imaging is completed. Resume post-operatively once appropriate.      Precautions: frequent SOB and lightheadedness with laying flat        Insurance:  AMA/CMS Eval/ Re-eval Auth #/ Referral # Total units or visits Start date  Expiration date KX? Visit limitation?  PT only or  PT+OT? Co-Insurance   CMS 24 Not required        30% coinsurance                  POC Start Date POC Expiration Date Signed POC?   2024 pending   2024       Date               Visits/Units:  Used               Authed:  Remaining                    Access Code: [email or web address]  URL: [email or web address]  Date: 2024  Prepared by: Katarzyna Tristan    Exercises  - Seated Cervical Retraction  - 1 x daily - 7 x weekly - 2 sets - 10 reps - 5sec hold  - Seated Thoracic Extension with Hands Behind Neck  - 1 x daily - 7 x weekly - 2 sets - 10 reps - 5sec hold (HOLD)  - Standing Median Nerve Glide  - 1 x daily - 7 x weekly - 2 sets - 5 reps  - Seated Scapular Retraction  - 1 x daily - 7 x weekly - 2 sets - 10 reps - 5sec  hold  - Seated Upper Trapezius Stretch  - 1 x daily - 7 x weekly - 1 sets - 4 reps - 10-20s hold  - Seated Levator Scapulae Stretch  - 1 x daily - 7 x weekly - 1 sets - 4 reps - 10-20s hold    date 10/29 10/23 10/9 10/2 9/27 9/5 8/28 8/13 7/29   Visit number  9 8 7 6 5 4 3 2 1   Manuals            STM  Suboccipital, UT, levator scap Suboccipital, UT, levator scap, pec minor  Suboccipital, UT Upper trap, levator scap Upper trap, pec minor, levator scap  Upper trap, pec minor, levator scap      Scapular mobs   Gentle posterior tilt and retraction          1st rib mob   Gentle                      Neuro Re-Ed            Scap retraction          +postural cues 5x    Nerve glides   Median n PT assisted 2x10  Median n PT assisted 2x10  Medial n PT assisted 2x10  Median 10x PT assisted  Median n 5x      Horizontal abd YTB against wall 10x      YTB 10x       rows Black TB 15x           Shoulder ext Green TB 2x10           Pelvic tilts  Seated 10x                        Ther Ex            HEP     Re-eval   Updated  Cervical retraction w/ instruction Initiated     Education & management   LE reflexes, clonus testing, recommendation to follow up with neurologist    POC  Repeated motion assessment  w/  strength  Neurological testing completed, POC  Posture, TOS, possible MD referral    Cervical retraction   15x   15x   10x improved    Thoracic extension      15x   5x    W's on wall       15x       Cat cow  Seated 10x            SCM str 5s hold x 10           UT str 10x5s           Levator scap str 10x5s           Ther Activity                                    Gait Training                                    Modalities
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
## 2024-11-11 ENCOUNTER — OFFICE VISIT (OUTPATIENT)
Dept: CARDIAC SURGERY | Facility: CLINIC | Age: 24
End: 2024-11-11
Payer: COMMERCIAL

## 2024-11-11 VITALS
OXYGEN SATURATION: 97 % | BODY MASS INDEX: 22.22 KG/M2 | SYSTOLIC BLOOD PRESSURE: 125 MMHG | DIASTOLIC BLOOD PRESSURE: 77 MMHG | WEIGHT: 138.23 LBS | RESPIRATION RATE: 14 BRPM | HEIGHT: 66 IN | HEART RATE: 84 BPM | TEMPERATURE: 98.2 F

## 2024-11-11 DIAGNOSIS — G54.0 THORACIC OUTLET SYNDROME: Primary | ICD-10-CM

## 2024-11-11 PROCEDURE — 99213 OFFICE O/P EST LOW 20 MIN: CPT | Performed by: SURGERY

## 2024-11-11 RX ORDER — AZITHROMYCIN 250 MG/1
TABLET, FILM COATED ORAL
COMMUNITY
Start: 2024-11-04

## 2024-11-11 RX ORDER — ALBUTEROL SULFATE 90 UG/1
INHALANT RESPIRATORY (INHALATION)
COMMUNITY
Start: 2024-11-04

## 2024-11-11 RX ORDER — CEFAZOLIN SODIUM 2 G/50ML
2000 SOLUTION INTRAVENOUS ONCE
OUTPATIENT
Start: 2024-11-11 | End: 2024-11-11

## 2024-11-12 ENCOUNTER — PREP FOR PROCEDURE (OUTPATIENT)
Dept: CARDIAC SURGERY | Facility: CLINIC | Age: 24
End: 2024-11-12

## 2024-11-12 ENCOUNTER — TELEPHONE (OUTPATIENT)
Dept: CARDIAC SURGERY | Facility: CLINIC | Age: 24
End: 2024-11-12

## 2024-11-12 NOTE — TELEPHONE ENCOUNTER
Spoke with patient's mother Michelle and informed her that we will need to reschedule surgery to a different date per provider. Patient was initially scheduled for Friday, 11/22/24 with Dr. Gibson and Dr. Hill (assisting co-surgeon) and moving case to Friday, 12/13/24. Rescheduled post op appt for 12/30/24 at 340pm as well. Patient's mother approved new surgical date. She will inform son on the new date as he was unavailable at the time of the call.

## 2024-11-20 DIAGNOSIS — F41.8 ANXIETY ASSOCIATED WITH DEPRESSION: ICD-10-CM

## 2024-11-21 RX ORDER — TRAZODONE HYDROCHLORIDE 50 MG/1
50 TABLET, FILM COATED ORAL
Qty: 30 TABLET | Refills: 5 | Status: SHIPPED | OUTPATIENT
Start: 2024-11-21 | End: 2025-05-20

## 2024-12-02 ENCOUNTER — ANESTHESIA EVENT (OUTPATIENT)
Dept: PERIOP | Facility: HOSPITAL | Age: 24
DRG: 030 | End: 2024-12-02
Payer: COMMERCIAL

## 2024-12-02 NOTE — PRE-PROCEDURE INSTRUCTIONS
Pre-Surgery Instructions:   Medication Instructions    sertraline (ZOLOFT) 50 mg tablet Take day of surgery.    traZODone (DESYREL) 50 mg tablet Take night before surgery   Medication instructions for day surgery reviewed. Please use only a sip of water to take your instructed medications. Avoid all over the counter vitamins, supplements and NSAIDS for one week prior to surgery per anesthesia guidelines. Tylenol is ok to take as needed.     You will receive a call one business day prior to surgery with an arrival time and hospital directions. If your surgery is scheduled on a Monday, the hospital will be calling you on the Friday prior to your surgery. If you have not heard from anyone by 8pm, please call the hospital supervisor through the hospital  at [phone]. ([place] [phone] or [place] [phone]).    Do not eat or drink anything after midnight the night before your surgery, including candy, mints, lifesavers, or chewing gum. Do not drink alcohol 24hrs before your surgery. Try not to smoke at least 24hrs before your surgery.       Follow the pre surgery showering instructions as listed in the “My Surgical Experience Booklet” or otherwise provided by your surgeon's office. Do not use a blade to shave the surgical area 1 week before surgery. It is okay to use a clean electric clippers up to 24 hours before surgery. Do not apply any lotions, creams, including makeup, cologne, deodorant, or perfumes after showering on the day of your surgery. Do not use dry shampoo, hair spray, hair gel, or any type of hair products.     No contact lenses, eye make-up, or artificial eyelashes. Remove nail polish, including gel polish, and any artificial, gel, or acrylic nails if possible. Remove all jewelry including rings and body piercing jewelry.     Wear causal clothing that is easy to take on and off. Consider your type of surgery.    Keep any valuables, jewelry, piercings at home. Please bring any  specially ordered equipment (sling, braces) if indicated.    Arrange for a responsible person to drive you to and from the hospital on the day of your surgery. Please confirm the visitor policy for the day of your procedure when you receive your phone call with an arrival time.     Call the surgeon's office with any new illnesses, exposures, or additional questions prior to surgery.    Please reference your “My Surgical Experience Booklet” for additional information to prepare for your upcoming surgery.

## 2024-12-05 ENCOUNTER — APPOINTMENT (OUTPATIENT)
Dept: LAB | Facility: HOSPITAL | Age: 24
End: 2024-12-05
Payer: COMMERCIAL

## 2024-12-05 ENCOUNTER — LAB REQUISITION (OUTPATIENT)
Dept: LAB | Facility: HOSPITAL | Age: 24
End: 2024-12-05
Payer: COMMERCIAL

## 2024-12-05 DIAGNOSIS — G54.0 BRACHIAL PLEXUS DISORDERS: ICD-10-CM

## 2024-12-05 DIAGNOSIS — G54.0 THORACIC OUTLET SYNDROME: Primary | ICD-10-CM

## 2024-12-05 LAB
ABO GROUP BLD: NORMAL
BLD GP AB SCN SERPL QL: NEGATIVE
RH BLD: POSITIVE
SPECIMEN EXPIRATION DATE: NORMAL

## 2024-12-05 PROCEDURE — 36415 COLL VENOUS BLD VENIPUNCTURE: CPT

## 2024-12-05 PROCEDURE — 86901 BLOOD TYPING SEROLOGIC RH(D): CPT | Performed by: SURGERY

## 2024-12-05 PROCEDURE — 86850 RBC ANTIBODY SCREEN: CPT | Performed by: SURGERY

## 2024-12-05 PROCEDURE — 86900 BLOOD TYPING SEROLOGIC ABO: CPT | Performed by: SURGERY

## 2024-12-06 ENCOUNTER — OFFICE VISIT (OUTPATIENT)
Dept: FAMILY MEDICINE CLINIC | Facility: CLINIC | Age: 24
End: 2024-12-06
Payer: COMMERCIAL

## 2024-12-06 VITALS
WEIGHT: 144.6 LBS | RESPIRATION RATE: 18 BRPM | OXYGEN SATURATION: 99 % | BODY MASS INDEX: 23.24 KG/M2 | TEMPERATURE: 98.2 F | SYSTOLIC BLOOD PRESSURE: 114 MMHG | HEART RATE: 82 BPM | DIASTOLIC BLOOD PRESSURE: 70 MMHG | HEIGHT: 66 IN

## 2024-12-06 DIAGNOSIS — F41.8 ANXIETY ASSOCIATED WITH DEPRESSION: Primary | ICD-10-CM

## 2024-12-06 DIAGNOSIS — R17 ELEVATED BILIRUBIN: ICD-10-CM

## 2024-12-06 DIAGNOSIS — K58.1 IRRITABLE BOWEL SYNDROME WITH CONSTIPATION: ICD-10-CM

## 2024-12-06 DIAGNOSIS — G89.29 CHRONIC BILATERAL LOW BACK PAIN WITHOUT SCIATICA: ICD-10-CM

## 2024-12-06 DIAGNOSIS — M54.50 CHRONIC BILATERAL LOW BACK PAIN WITHOUT SCIATICA: ICD-10-CM

## 2024-12-06 DIAGNOSIS — M48.07 SPINAL STENOSIS OF LUMBOSACRAL REGION: ICD-10-CM

## 2024-12-06 DIAGNOSIS — G54.0 THORACIC OUTLET SYNDROME: ICD-10-CM

## 2024-12-06 PROBLEM — J93.9 PNEUMOTHORAX ON RIGHT: Status: RESOLVED | Noted: 2020-06-28 | Resolved: 2024-12-06

## 2024-12-06 PROCEDURE — 99214 OFFICE O/P EST MOD 30 MIN: CPT | Performed by: STUDENT IN AN ORGANIZED HEALTH CARE EDUCATION/TRAINING PROGRAM

## 2024-12-06 NOTE — H&P (VIEW-ONLY)
Clinic Visit Note  Jay Ortiz 24 y.o. male   MRN: [id number]    Assessment and Plan      Diagnoses and all orders for this visit:    Anxiety associated with depression    Irritable bowel syndrome with constipation    Thoracic outlet syndrome  -     Ambulatory Referral to Social Work Care Management Program; Future    Spinal stenosis of lumbosacral region    Chronic bilateral low back pain without sciatica    Elevated bilirubin      Patient is a pleasant 24-year-old male coming in for follow-up visit, will be having thoracic outlet surgery next week, would like to see patient 4 weeks after surgery, appropriate physical therapy scheduled.    Anxiety associated with depression, continue sertraline/trazodone, well-controlled.    Lower back pain, MRI lumbar spine pending/scheduled.    Follow-up as needed.  Follow-up scheduled.    My impressions and treatment recommendations were discussed in detail with the patient who verbalized understanding and had no further questions.  Discharge instructions were provided.    Subjective     Chief Complaint: F/U    History of Present Illness:    Follow-up chronic disease management.    The following portions of the patient's history were reviewed and updated as appropriate: allergies, current medications, past family history, past medical history, past social history, past surgical history and problem list.    REVIEW OF SYSTEMS:  A complete 12-point review of systems is negative other than that noted in the HPI.    Review of Systems   Constitutional:  Negative for chills, fatigue and fever.   HENT:  Negative for congestion and sore throat.    Eyes:  Negative for pain and visual disturbance.   Respiratory:  Negative for shortness of breath and wheezing.    Cardiovascular:  Negative for chest pain and palpitations.   Gastrointestinal:  Negative for abdominal pain, constipation, diarrhea, nausea and vomiting.   Genitourinary:  Negative for dysuria and frequency.    Musculoskeletal:  Positive for back pain. Negative for neck pain.   Skin:  Negative for color change and rash.   Neurological:  Negative for dizziness and headaches.   Psychiatric/Behavioral:  Negative for agitation and confusion.    All other systems reviewed and are negative.        Current Outpatient Medications:     sertraline (ZOLOFT) 50 mg tablet, Take 1 tablet (50 mg total) by mouth in the morning, Disp: 30 tablet, Rfl: 2    traZODone (DESYREL) 50 mg tablet, Take 1 tablet (50 mg total) by mouth daily at bedtime, Disp: 30 tablet, Rfl: 5  Past Medical History:   Diagnosis Date    ADHD     ADHD (attention deficit hyperactivity disorder)     Anxiety     Anxiety     Depression     Depression      Past Surgical History:   Procedure Laterality Date    ADENOIDECTOMY      TONSILLECTOMY      TYMPANOSTOMY TUBE PLACEMENT      removed    WISDOM TOOTH EXTRACTION       Social History     Socioeconomic History    Marital status: Single     Spouse name: Not on file    Number of children: Not on file    Years of education: Not on file    Highest education level: Not on file   Occupational History    Not on file   Tobacco Use    Smoking status: Never    Smokeless tobacco: Never   Vaping Use    Vaping status: Never Used   Substance and Sexual Activity    Alcohol use: Not Currently    Drug use: Not Currently     Types: Marijuana    Sexual activity: Not on file   Other Topics Concern    Not on file   Social History Narrative    ** Merged History Encounter **          Social Drivers of Health     Financial Resource Strain: Not on file   Food Insecurity: Not on file   Transportation Needs: Not on file   Physical Activity: Not on file   Stress: Not on file   Social Connections: Not on file   Intimate Partner Violence: Not on file   Housing Stability: Not on file     Family History   Problem Relation Age of Onset    No Known Problems Mother     Diabetes Father      Allergies   Allergen Reactions    Actical GI Intolerance    Gluten  "Meal - Food Allergy Other (See Comments)       Objective     Vitals:    12/06/24 1424   BP: 114/70   BP Location: Left arm   Patient Position: Sitting   Cuff Size: Standard   Pulse: 82   Resp: 18   Temp: 98.2 °F (36.8 °C)   TempSrc: Temporal   SpO2: 99%   Weight: 65.6 kg (144 lb 9.6 oz)   Height: 5' 5.5\" (1.664 m)       Physical Exam:     GENERAL: NAD, pleasant   HEENT:  NC/AT, PERRL, EOMI, no scleral icterus  CARDIAC:  RRR, +S1/S2, no S3/S4 appreciated, no m/g/r  PULMONARY:  CTA B/L, no wheezing/rales/rhonci, non-labored breathing  ABDOMEN:  Soft, NT/ND, no rebound/guarding/rigidity  Extremities:. No edema, cyanosis, or clubbing  Musculoskeletal:  Full range of motion intact in all extremities   NEUROLOGIC: Grossly intact, no focal deficits  SKIN:  No rashes or erythema noted on exposed skin  Psych: Normal affect, mood stable    ==  PLEASE NOTE:  This encounter was completed utilizing the [employer] Direct Speech Voice Recognition Software. Grammatical errors, random word insertions, pronoun errors and incomplete sentences are occasional consequences of the system due to software limitations, ambient noise and hardware issues.These may be missed by proof reading prior to affixing electronic signature. Any questions or concerns about the content, text or information contained within the body of this dictation should be directly addressed to the physician for clarification. Please do not hesitate to call me directly if you have any any questions or concerns.    Low Zaidi, DO  [facility]   [facility]     "

## 2024-12-06 NOTE — PROGRESS NOTES
Clinic Visit Note  Jay Ortiz 24 y.o. male   MRN: [id number]    Assessment and Plan      Diagnoses and all orders for this visit:    Anxiety associated with depression    Irritable bowel syndrome with constipation    Thoracic outlet syndrome  -     Ambulatory Referral to Social Work Care Management Program; Future    Spinal stenosis of lumbosacral region    Chronic bilateral low back pain without sciatica    Elevated bilirubin      Patient is a pleasant 24-year-old male coming in for follow-up visit, will be having thoracic outlet surgery next week, would like to see patient 4 weeks after surgery, appropriate physical therapy scheduled.    Anxiety associated with depression, continue sertraline/trazodone, well-controlled.    Lower back pain, MRI lumbar spine pending/scheduled.    Follow-up as needed.  Follow-up scheduled.    My impressions and treatment recommendations were discussed in detail with the patient who verbalized understanding and had no further questions.  Discharge instructions were provided.    Subjective     Chief Complaint: F/U    History of Present Illness:    Follow-up chronic disease management.    The following portions of the patient's history were reviewed and updated as appropriate: allergies, current medications, past family history, past medical history, past social history, past surgical history and problem list.    REVIEW OF SYSTEMS:  A complete 12-point review of systems is negative other than that noted in the HPI.    Review of Systems   Constitutional:  Negative for chills, fatigue and fever.   HENT:  Negative for congestion and sore throat.    Eyes:  Negative for pain and visual disturbance.   Respiratory:  Negative for shortness of breath and wheezing.    Cardiovascular:  Negative for chest pain and palpitations.   Gastrointestinal:  Negative for abdominal pain, constipation, diarrhea, nausea and vomiting.   Genitourinary:  Negative for dysuria and frequency.    Musculoskeletal:  Positive for back pain. Negative for neck pain.   Skin:  Negative for color change and rash.   Neurological:  Negative for dizziness and headaches.   Psychiatric/Behavioral:  Negative for agitation and confusion.    All other systems reviewed and are negative.        Current Outpatient Medications:     sertraline (ZOLOFT) 50 mg tablet, Take 1 tablet (50 mg total) by mouth in the morning, Disp: 30 tablet, Rfl: 2    traZODone (DESYREL) 50 mg tablet, Take 1 tablet (50 mg total) by mouth daily at bedtime, Disp: 30 tablet, Rfl: 5  Past Medical History:   Diagnosis Date    ADHD     ADHD (attention deficit hyperactivity disorder)     Anxiety     Anxiety     Depression     Depression      Past Surgical History:   Procedure Laterality Date    ADENOIDECTOMY      TONSILLECTOMY      TYMPANOSTOMY TUBE PLACEMENT      removed    WISDOM TOOTH EXTRACTION       Social History     Socioeconomic History    Marital status: Single     Spouse name: Not on file    Number of children: Not on file    Years of education: Not on file    Highest education level: Not on file   Occupational History    Not on file   Tobacco Use    Smoking status: Never    Smokeless tobacco: Never   Vaping Use    Vaping status: Never Used   Substance and Sexual Activity    Alcohol use: Not Currently    Drug use: Not Currently     Types: Marijuana    Sexual activity: Not on file   Other Topics Concern    Not on file   Social History Narrative    ** Merged History Encounter **          Social Drivers of Health     Financial Resource Strain: Not on file   Food Insecurity: Not on file   Transportation Needs: Not on file   Physical Activity: Not on file   Stress: Not on file   Social Connections: Not on file   Intimate Partner Violence: Not on file   Housing Stability: Not on file     Family History   Problem Relation Age of Onset    No Known Problems Mother     Diabetes Father      Allergies   Allergen Reactions    Actical GI Intolerance    Gluten  "Meal - Food Allergy Other (See Comments)       Objective     Vitals:    12/06/24 1424   BP: 114/70   BP Location: Left arm   Patient Position: Sitting   Cuff Size: Standard   Pulse: 82   Resp: 18   Temp: 98.2 °F (36.8 °C)   TempSrc: Temporal   SpO2: 99%   Weight: 65.6 kg (144 lb 9.6 oz)   Height: 5' 5.5\" (1.664 m)       Physical Exam:     GENERAL: NAD, pleasant   HEENT:  NC/AT, PERRL, EOMI, no scleral icterus  CARDIAC:  RRR, +S1/S2, no S3/S4 appreciated, no m/g/r  PULMONARY:  CTA B/L, no wheezing/rales/rhonci, non-labored breathing  ABDOMEN:  Soft, NT/ND, no rebound/guarding/rigidity  Extremities:. No edema, cyanosis, or clubbing  Musculoskeletal:  Full range of motion intact in all extremities   NEUROLOGIC: Grossly intact, no focal deficits  SKIN:  No rashes or erythema noted on exposed skin  Psych: Normal affect, mood stable    ==  PLEASE NOTE:  This encounter was completed utilizing the [employer] Direct Speech Voice Recognition Software. Grammatical errors, random word insertions, pronoun errors and incomplete sentences are occasional consequences of the system due to software limitations, ambient noise and hardware issues.These may be missed by proof reading prior to affixing electronic signature. Any questions or concerns about the content, text or information contained within the body of this dictation should be directly addressed to the physician for clarification. Please do not hesitate to call me directly if you have any any questions or concerns.    Low Zaidi, DO  [facility]   [facility]     "

## 2024-12-09 ENCOUNTER — PREP FOR PROCEDURE (OUTPATIENT)
Dept: CARDIAC SURGERY | Facility: CLINIC | Age: 24
End: 2024-12-09

## 2024-12-09 ENCOUNTER — PATIENT OUTREACH (OUTPATIENT)
Dept: CASE MANAGEMENT | Facility: OTHER | Age: 24
End: 2024-12-09

## 2024-12-09 ENCOUNTER — TELEPHONE (OUTPATIENT)
Dept: UROLOGY | Facility: CLINIC | Age: 24
End: 2024-12-09

## 2024-12-09 ENCOUNTER — TELEPHONE (OUTPATIENT)
Age: 24
End: 2024-12-09

## 2024-12-09 DIAGNOSIS — F41.8 ANXIETY ASSOCIATED WITH DEPRESSION: ICD-10-CM

## 2024-12-09 NOTE — PROGRESS NOTES
OLI LOZA received a referral from patient's PCP asking if some one would be able to assist patient with disability forms as he will be having surgery shortly. OLI LOZA is not able to assist with completing work disability forms but will call patient to discuss. OLI LOZA reviewed patient's chart and called patient ([phone]). Patient did not answer, OLI LOZA left a message and requested a call back. OLI LOZA will attempt to outreach again at a later date.

## 2024-12-09 NOTE — TELEPHONE ENCOUNTER
Pt has an appt with Lane at 1:40 and was calling to find out if he can still be seen because he got stuck in traffic and his GPS was saying 2:10 arrival.    Before able to confirm with the office, call got disconnected.

## 2024-12-09 NOTE — TELEPHONE ENCOUNTER
Left message on voice mail  Patient running late and would not be here with in the 15 min alistair period. Being a new patient there was no available time slot.  Asked patient to call and reschedule.

## 2024-12-12 ENCOUNTER — PATIENT OUTREACH (OUTPATIENT)
Dept: CASE MANAGEMENT | Facility: OTHER | Age: 24
End: 2024-12-12

## 2024-12-12 NOTE — PROGRESS NOTES
OLI LOZA received a referral from patient's PCP asking if some one would be able to assist patient with disability forms as he will be having surgery shortly. OLI LOZA is not able to assist with completing work disability forms but will call patient to discuss. OLI LOZA reviewed patient's chart and called patient ([phone]) a second time. Patient answered and stated he needed to complete FMLA paperwork but believes with the assistance of his mother he can complete. Patient had no other questions, concerns or needs. OLI LOZA will close. Please re consult OLI LOZA as needed.

## 2024-12-13 ENCOUNTER — ANESTHESIA (OUTPATIENT)
Dept: PERIOP | Facility: HOSPITAL | Age: 24
DRG: 030 | End: 2024-12-13
Payer: COMMERCIAL

## 2024-12-13 ENCOUNTER — APPOINTMENT (INPATIENT)
Dept: RADIOLOGY | Facility: HOSPITAL | Age: 24
DRG: 030 | End: 2024-12-13
Payer: COMMERCIAL

## 2024-12-13 ENCOUNTER — HOSPITAL ENCOUNTER (INPATIENT)
Facility: HOSPITAL | Age: 24
LOS: 2 days | Discharge: HOME | DRG: 030 | End: 2024-12-15
Attending: SURGERY | Admitting: SURGERY
Payer: COMMERCIAL

## 2024-12-13 DIAGNOSIS — G54.0 THORACIC OUTLET SYNDROME: ICD-10-CM

## 2024-12-13 LAB
ABO GROUP BLD: NORMAL
ERYTHROCYTE [DISTWIDTH] IN BLOOD BY AUTOMATED COUNT: 12.7 % (ref 11.6–15.1)
HCT VFR BLD AUTO: 39.2 % (ref 36.5–49.3)
HGB BLD-MCNC: 14 G/DL (ref 12–17)
MCH RBC QN AUTO: 32.2 PG (ref 26.8–34.3)
MCHC RBC AUTO-ENTMCNC: 35.7 G/DL (ref 31.4–37.4)
MCV RBC AUTO: 90 FL (ref 82–98)
PLATELET # BLD AUTO: 242 THOUSANDS/UL (ref 149–390)
PMV BLD AUTO: 8.7 FL (ref 8.9–12.7)
RBC # BLD AUTO: 4.35 MILLION/UL (ref 3.88–5.62)
RH BLD: POSITIVE
WBC # BLD AUTO: 7.23 THOUSAND/UL (ref 4.31–10.16)

## 2024-12-13 PROCEDURE — 0PB10ZZ EXCISION OF 1 TO 2 RIBS, OPEN APPROACH: ICD-10-PCS | Performed by: SURGERY

## 2024-12-13 PROCEDURE — S2900 ROBOTIC SURGICAL SYSTEM: HCPCS | Performed by: SURGERY

## 2024-12-13 PROCEDURE — 21899 UNLISTED PX NECK/THORAX: CPT | Performed by: SURGERY

## 2024-12-13 PROCEDURE — 21899 UNLISTED PX NECK/THORAX: CPT | Performed by: THORACIC SURGERY (CARDIOTHORACIC VASCULAR SURGERY)

## 2024-12-13 PROCEDURE — 71045 X-RAY EXAM CHEST 1 VIEW: CPT

## 2024-12-13 PROCEDURE — 0BJ08ZZ INSPECTION OF TRACHEOBRONCHIAL TREE, VIA NATURAL OR ARTIFICIAL OPENING ENDOSCOPIC: ICD-10-PCS | Performed by: SURGERY

## 2024-12-13 PROCEDURE — 88311 DECALCIFY TISSUE: CPT | Performed by: PATHOLOGY

## 2024-12-13 PROCEDURE — 88304 TISSUE EXAM BY PATHOLOGIST: CPT | Performed by: PATHOLOGY

## 2024-12-13 PROCEDURE — C9290 INJ, BUPIVACAINE LIPOSOME: HCPCS | Performed by: SURGERY

## 2024-12-13 PROCEDURE — 85027 COMPLETE CBC AUTOMATED: CPT | Performed by: ANESTHESIOLOGY

## 2024-12-13 RX ORDER — SENNOSIDES 8.6 MG
1 TABLET ORAL DAILY
Status: DISCONTINUED | OUTPATIENT
Start: 2024-12-13 | End: 2024-12-15 | Stop reason: HOSPADM

## 2024-12-13 RX ORDER — METOCLOPRAMIDE HYDROCHLORIDE 5 MG/ML
10 INJECTION INTRAMUSCULAR; INTRAVENOUS ONCE AS NEEDED
Status: DISCONTINUED | OUTPATIENT
Start: 2024-12-13 | End: 2024-12-13 | Stop reason: HOSPADM

## 2024-12-13 RX ORDER — POLYETHYLENE GLYCOL 3350 17 G/17G
17 POWDER, FOR SOLUTION ORAL DAILY PRN
Status: DISCONTINUED | OUTPATIENT
Start: 2024-12-13 | End: 2024-12-15 | Stop reason: HOSPADM

## 2024-12-13 RX ORDER — PROPOFOL 10 MG/ML
INJECTION, EMULSION INTRAVENOUS AS NEEDED
Status: DISCONTINUED | OUTPATIENT
Start: 2024-12-13 | End: 2024-12-13

## 2024-12-13 RX ORDER — HYDROMORPHONE HCL IN WATER/PF 6 MG/30 ML
0.2 PATIENT CONTROLLED ANALGESIA SYRINGE INTRAVENOUS
Status: DISCONTINUED | OUTPATIENT
Start: 2024-12-13 | End: 2024-12-13 | Stop reason: HOSPADM

## 2024-12-13 RX ORDER — ENOXAPARIN SODIUM 100 MG/ML
40 INJECTION SUBCUTANEOUS DAILY
Status: DISCONTINUED | OUTPATIENT
Start: 2024-12-14 | End: 2024-12-15 | Stop reason: HOSPADM

## 2024-12-13 RX ORDER — FENTANYL CITRATE 50 UG/ML
INJECTION, SOLUTION INTRAMUSCULAR; INTRAVENOUS AS NEEDED
Status: DISCONTINUED | OUTPATIENT
Start: 2024-12-13 | End: 2024-12-13

## 2024-12-13 RX ORDER — DOCUSATE SODIUM 100 MG/1
100 CAPSULE, LIQUID FILLED ORAL 2 TIMES DAILY
Status: DISCONTINUED | OUTPATIENT
Start: 2024-12-13 | End: 2024-12-15 | Stop reason: HOSPADM

## 2024-12-13 RX ORDER — ONDANSETRON 2 MG/ML
4 INJECTION INTRAMUSCULAR; INTRAVENOUS EVERY 6 HOURS PRN
Status: DISCONTINUED | OUTPATIENT
Start: 2024-12-13 | End: 2024-12-15 | Stop reason: HOSPADM

## 2024-12-13 RX ORDER — CELECOXIB 100 MG/1
100 CAPSULE ORAL 2 TIMES DAILY
Status: DISCONTINUED | OUTPATIENT
Start: 2024-12-13 | End: 2024-12-15 | Stop reason: HOSPADM

## 2024-12-13 RX ORDER — SODIUM CHLORIDE 9 MG/ML
INJECTION, SOLUTION INTRAVENOUS CONTINUOUS PRN
Status: DISCONTINUED | OUTPATIENT
Start: 2024-12-13 | End: 2024-12-13

## 2024-12-13 RX ORDER — EPHEDRINE SULFATE 50 MG/ML
INJECTION INTRAVENOUS AS NEEDED
Status: DISCONTINUED | OUTPATIENT
Start: 2024-12-13 | End: 2024-12-13

## 2024-12-13 RX ORDER — ONDANSETRON 2 MG/ML
INJECTION INTRAMUSCULAR; INTRAVENOUS AS NEEDED
Status: DISCONTINUED | OUTPATIENT
Start: 2024-12-13 | End: 2024-12-13

## 2024-12-13 RX ORDER — LIDOCAINE HYDROCHLORIDE 10 MG/ML
INJECTION, SOLUTION EPIDURAL; INFILTRATION; INTRACAUDAL; PERINEURAL AS NEEDED
Status: DISCONTINUED | OUTPATIENT
Start: 2024-12-13 | End: 2024-12-13

## 2024-12-13 RX ORDER — SODIUM CHLORIDE, SODIUM LACTATE, POTASSIUM CHLORIDE, CALCIUM CHLORIDE 600; 310; 30; 20 MG/100ML; MG/100ML; MG/100ML; MG/100ML
60 INJECTION, SOLUTION INTRAVENOUS CONTINUOUS
Status: DISCONTINUED | OUTPATIENT
Start: 2024-12-13 | End: 2024-12-14

## 2024-12-13 RX ORDER — OXYCODONE HYDROCHLORIDE 5 MG/1
5 TABLET ORAL EVERY 4 HOURS PRN
Refills: 0 | Status: DISCONTINUED | OUTPATIENT
Start: 2024-12-13 | End: 2024-12-15 | Stop reason: HOSPADM

## 2024-12-13 RX ORDER — MIDAZOLAM HYDROCHLORIDE 2 MG/2ML
INJECTION, SOLUTION INTRAMUSCULAR; INTRAVENOUS AS NEEDED
Status: DISCONTINUED | OUTPATIENT
Start: 2024-12-13 | End: 2024-12-13

## 2024-12-13 RX ORDER — HYDROMORPHONE HCL/PF 1 MG/ML
0.5 SYRINGE (ML) INJECTION
Status: DISCONTINUED | OUTPATIENT
Start: 2024-12-13 | End: 2024-12-15 | Stop reason: HOSPADM

## 2024-12-13 RX ORDER — ALBUTEROL SULFATE 0.83 MG/ML
2.5 SOLUTION RESPIRATORY (INHALATION) ONCE
Status: DISCONTINUED | OUTPATIENT
Start: 2024-12-13 | End: 2024-12-13 | Stop reason: HOSPADM

## 2024-12-13 RX ORDER — ONDANSETRON 2 MG/ML
4 INJECTION INTRAMUSCULAR; INTRAVENOUS ONCE AS NEEDED
Status: DISCONTINUED | OUTPATIENT
Start: 2024-12-13 | End: 2024-12-13 | Stop reason: HOSPADM

## 2024-12-13 RX ORDER — GLYCOPYRROLATE 0.2 MG/ML
INJECTION INTRAMUSCULAR; INTRAVENOUS AS NEEDED
Status: DISCONTINUED | OUTPATIENT
Start: 2024-12-13 | End: 2024-12-13

## 2024-12-13 RX ORDER — FENTANYL CITRATE/PF 50 MCG/ML
25 SYRINGE (ML) INJECTION
Refills: 0 | Status: DISCONTINUED | OUTPATIENT
Start: 2024-12-13 | End: 2024-12-13 | Stop reason: HOSPADM

## 2024-12-13 RX ORDER — ALBUMIN HUMAN 50 G/1000ML
SOLUTION INTRAVENOUS CONTINUOUS PRN
Status: DISCONTINUED | OUTPATIENT
Start: 2024-12-13 | End: 2024-12-13

## 2024-12-13 RX ORDER — ROCURONIUM BROMIDE 10 MG/ML
INJECTION, SOLUTION INTRAVENOUS AS NEEDED
Status: DISCONTINUED | OUTPATIENT
Start: 2024-12-13 | End: 2024-12-13

## 2024-12-13 RX ORDER — GABAPENTIN 300 MG/1
300 CAPSULE ORAL 3 TIMES DAILY
Status: DISCONTINUED | OUTPATIENT
Start: 2024-12-13 | End: 2024-12-15 | Stop reason: HOSPADM

## 2024-12-13 RX ORDER — DEXAMETHASONE SODIUM PHOSPHATE 10 MG/ML
INJECTION, SOLUTION INTRAMUSCULAR; INTRAVENOUS AS NEEDED
Status: DISCONTINUED | OUTPATIENT
Start: 2024-12-13 | End: 2024-12-13

## 2024-12-13 RX ORDER — ACETAMINOPHEN 325 MG/1
975 TABLET ORAL EVERY 6 HOURS
Status: DISCONTINUED | OUTPATIENT
Start: 2024-12-13 | End: 2024-12-15 | Stop reason: HOSPADM

## 2024-12-13 RX ORDER — LIDOCAINE HYDROCHLORIDE 10 MG/ML
0.5 INJECTION, SOLUTION EPIDURAL; INFILTRATION; INTRACAUDAL; PERINEURAL ONCE AS NEEDED
Status: DISCONTINUED | OUTPATIENT
Start: 2024-12-13 | End: 2024-12-13 | Stop reason: HOSPADM

## 2024-12-13 RX ORDER — SODIUM CHLORIDE, SODIUM LACTATE, POTASSIUM CHLORIDE, CALCIUM CHLORIDE 600; 310; 30; 20 MG/100ML; MG/100ML; MG/100ML; MG/100ML
125 INJECTION, SOLUTION INTRAVENOUS CONTINUOUS
Status: DISCONTINUED | OUTPATIENT
Start: 2024-12-13 | End: 2024-12-13

## 2024-12-13 RX ORDER — KETAMINE HYDROCHLORIDE 50 MG/ML
INJECTION, SOLUTION INTRAMUSCULAR; INTRAVENOUS AS NEEDED
Status: DISCONTINUED | OUTPATIENT
Start: 2024-12-13 | End: 2024-12-13

## 2024-12-13 RX ORDER — OXYCODONE HYDROCHLORIDE 5 MG/1
2.5 TABLET ORAL EVERY 4 HOURS PRN
Refills: 0 | Status: DISCONTINUED | OUTPATIENT
Start: 2024-12-13 | End: 2024-12-15 | Stop reason: HOSPADM

## 2024-12-13 RX ORDER — FAMOTIDINE 20 MG/1
20 TABLET, FILM COATED ORAL 2 TIMES DAILY
Status: DISCONTINUED | OUTPATIENT
Start: 2024-12-13 | End: 2024-12-15 | Stop reason: HOSPADM

## 2024-12-13 RX ORDER — TRAZODONE HYDROCHLORIDE 50 MG/1
50 TABLET, FILM COATED ORAL
Status: DISCONTINUED | OUTPATIENT
Start: 2024-12-13 | End: 2024-12-15 | Stop reason: HOSPADM

## 2024-12-13 RX ORDER — CEFAZOLIN SODIUM 2 G/50ML
2000 SOLUTION INTRAVENOUS ONCE
Status: COMPLETED | OUTPATIENT
Start: 2024-12-13 | End: 2024-12-13

## 2024-12-13 RX ADMIN — OXYCODONE HYDROCHLORIDE 5 MG: 5 TABLET ORAL at 15:20

## 2024-12-13 RX ADMIN — FENTANYL CITRATE 25 MCG: 50 INJECTION INTRAMUSCULAR; INTRAVENOUS at 13:04

## 2024-12-13 RX ADMIN — KETAMINE HYDROCHLORIDE 10 MG: 50 INJECTION INTRAMUSCULAR; INTRAVENOUS at 12:09

## 2024-12-13 RX ADMIN — DEXMEDETOMIDINE HYDROCHLORIDE 0.2 MCG/KG/HR: 100 INJECTION, SOLUTION INTRAVENOUS at 08:25

## 2024-12-13 RX ADMIN — HYDROMORPHONE HYDROCHLORIDE 0.2 MG: 0.2 INJECTION, SOLUTION INTRAMUSCULAR; INTRAVENOUS; SUBCUTANEOUS at 13:30

## 2024-12-13 RX ADMIN — PROPOFOL 200 MG: 10 INJECTION, EMULSION INTRAVENOUS at 07:52

## 2024-12-13 RX ADMIN — MIDAZOLAM 2 MG: 1 INJECTION INTRAMUSCULAR; INTRAVENOUS at 07:41

## 2024-12-13 RX ADMIN — EPHEDRINE SULFATE 5 MG: 50 INJECTION, SOLUTION INTRAVENOUS at 08:55

## 2024-12-13 RX ADMIN — ACETAMINOPHEN 975 MG: 325 TABLET, FILM COATED ORAL at 20:00

## 2024-12-13 RX ADMIN — DEXAMETHASONE SODIUM PHOSPHATE 10 MG: 10 INJECTION, SOLUTION INTRAMUSCULAR; INTRAVENOUS at 08:00

## 2024-12-13 RX ADMIN — FENTANYL CITRATE 25 MCG: 50 INJECTION INTRAMUSCULAR; INTRAVENOUS at 13:21

## 2024-12-13 RX ADMIN — REMIFENTANIL HYDROCHLORIDE 0.1 MCG/KG/MIN: 1 INJECTION, POWDER, LYOPHILIZED, FOR SOLUTION INTRAVENOUS at 08:25

## 2024-12-13 RX ADMIN — FENTANYL CITRATE 100 MCG: 50 INJECTION INTRAMUSCULAR; INTRAVENOUS at 07:52

## 2024-12-13 RX ADMIN — ROCURONIUM BROMIDE 20 MG: 10 INJECTION, SOLUTION INTRAVENOUS at 08:36

## 2024-12-13 RX ADMIN — GABAPENTIN 300 MG: 300 CAPSULE ORAL at 20:00

## 2024-12-13 RX ADMIN — HYDROMORPHONE HYDROCHLORIDE 0.2 MG: 0.2 INJECTION, SOLUTION INTRAMUSCULAR; INTRAVENOUS; SUBCUTANEOUS at 13:37

## 2024-12-13 RX ADMIN — GLYCOPYRROLATE 0.2 MCG: 0.2 INJECTION, SOLUTION INTRAMUSCULAR; INTRAVENOUS at 09:51

## 2024-12-13 RX ADMIN — ROCURONIUM BROMIDE 10 MG: 10 INJECTION, SOLUTION INTRAVENOUS at 11:34

## 2024-12-13 RX ADMIN — ROCURONIUM BROMIDE 20 MG: 10 INJECTION, SOLUTION INTRAVENOUS at 09:25

## 2024-12-13 RX ADMIN — SUGAMMADEX 200 MG: 100 INJECTION, SOLUTION INTRAVENOUS at 12:23

## 2024-12-13 RX ADMIN — FENTANYL CITRATE 25 MCG: 50 INJECTION INTRAMUSCULAR; INTRAVENOUS at 13:16

## 2024-12-13 RX ADMIN — SODIUM CHLORIDE: 0.9 INJECTION, SOLUTION INTRAVENOUS at 08:00

## 2024-12-13 RX ADMIN — KETAMINE HYDROCHLORIDE 20 MG: 50 INJECTION INTRAMUSCULAR; INTRAVENOUS at 07:55

## 2024-12-13 RX ADMIN — KETAMINE HYDROCHLORIDE 10 MG: 50 INJECTION INTRAMUSCULAR; INTRAVENOUS at 09:25

## 2024-12-13 RX ADMIN — CEFAZOLIN SODIUM 2000 MG: 2 SOLUTION INTRAVENOUS at 07:41

## 2024-12-13 RX ADMIN — EPHEDRINE SULFATE 5 MG: 50 INJECTION, SOLUTION INTRAVENOUS at 11:58

## 2024-12-13 RX ADMIN — EPHEDRINE SULFATE 5 MG: 50 INJECTION, SOLUTION INTRAVENOUS at 11:43

## 2024-12-13 RX ADMIN — GABAPENTIN 300 MG: 300 CAPSULE ORAL at 17:11

## 2024-12-13 RX ADMIN — ALBUMIN (HUMAN): 12.5 INJECTION, SOLUTION INTRAVENOUS at 08:56

## 2024-12-13 RX ADMIN — ONDANSETRON 4 MG: 2 INJECTION INTRAMUSCULAR; INTRAVENOUS at 12:35

## 2024-12-13 RX ADMIN — LIDOCAINE HYDROCHLORIDE 50 MG: 10 INJECTION, SOLUTION EPIDURAL; INFILTRATION; INTRACAUDAL; PERINEURAL at 07:52

## 2024-12-13 RX ADMIN — ROCURONIUM BROMIDE 10 MG: 10 INJECTION, SOLUTION INTRAVENOUS at 10:51

## 2024-12-13 RX ADMIN — KETAMINE HYDROCHLORIDE 10 MG: 50 INJECTION INTRAMUSCULAR; INTRAVENOUS at 10:48

## 2024-12-13 RX ADMIN — CEFAZOLIN SODIUM 2000 MG: 2 SOLUTION INTRAVENOUS at 11:35

## 2024-12-13 RX ADMIN — ROCURONIUM BROMIDE 10 MG: 10 INJECTION, SOLUTION INTRAVENOUS at 10:12

## 2024-12-13 RX ADMIN — EPHEDRINE SULFATE 10 MG: 50 INJECTION, SOLUTION INTRAVENOUS at 08:30

## 2024-12-13 RX ADMIN — PHENYLEPHRINE HYDROCHLORIDE 20 MCG/MIN: 10 INJECTION INTRAVENOUS at 08:25

## 2024-12-13 RX ADMIN — SODIUM CHLORIDE, SODIUM LACTATE, POTASSIUM CHLORIDE, AND CALCIUM CHLORIDE: .6; .31; .03; .02 INJECTION, SOLUTION INTRAVENOUS at 07:11

## 2024-12-13 RX ADMIN — ROCURONIUM BROMIDE 50 MG: 10 INJECTION, SOLUTION INTRAVENOUS at 07:52

## 2024-12-13 RX ADMIN — EPHEDRINE SULFATE 5 MG: 50 INJECTION, SOLUTION INTRAVENOUS at 09:22

## 2024-12-13 RX ADMIN — ALBUMIN (HUMAN): 12.5 INJECTION, SOLUTION INTRAVENOUS at 08:30

## 2024-12-13 RX ADMIN — CELECOXIB 100 MG: 100 CAPSULE ORAL at 17:37

## 2024-12-13 RX ADMIN — FENTANYL CITRATE 50 MCG: 50 INJECTION INTRAMUSCULAR; INTRAVENOUS at 13:00

## 2024-12-13 NOTE — OP NOTE
OPERATIVE REPORT  PATIENT NAME: Jay Ortiz    :  2000  MRN: [id number]  Pt Location: [facility] OR ROOM 14    SURGERY DATE: 2024    Surgeons and Role:     * Dc Gibson,  - Primary     * Vaughn Hill MD - Assisting     * Brittaney Mireles PA-C - Assisting     * Dipesh Mendoza DO - Observing    Preop Diagnosis:  Thoracic outlet syndrome [G54.0]    Post-Op Diagnosis Codes:     * Thoracic outlet syndrome [G54.0]    Procedure(s):  Robotic assisted left first rib resection    Specimen(s):  ID Type Source Tests Collected by Time Destination   1 : FIRST RIB TISSUE Tissue Soft Tissue, Other TISSUE EXAM Dc Gibson,  2024 1153        Estimated Blood Loss:   Minimal    Drains:  Chest Tube 1 Left Pleural;Midaxillary 24 Fr. (Active)   Function To water seal 24 1328   Chest Tube Air Leak No 24 1328   Drainage Description Dark red 24 1328   Dressing Status Clean;Dry;Intact 24 1328   Site Assessment Unable to assess 24 1328   Surrounding Skin Dry;Intact 24 1328   Number of days: 0       Anesthesia Type:   General    Operative Indications:  Thoracic outlet syndrome [G54.0]      Operative Findings:  Inflammation overlying the first rib      Complications:   None    Procedure and Technique:  The patient is a 24 year old male with bilateral neurological thoracic outlet syndrome. Given his symptoms are worse on the left arm we decided to start with that side today.     He was taken back to the operating room and moved to the operating room table. Name and procedure were confirmed. A double lumen endotracheal tube was placed and position was confirmed with a pediatric bronchoscope. He was positioned in the standard right lateral decubitus position. He was prepped with chloraprep and draped in a sterile manner. A timeout was called and complete. I started with an 8 port in the posterior axillary line, 4 fingers breadth anterior to the tip of the  scapula. Two additional 8mm ports were placed. One anterior to this in the same rib space, and one just below the tip of the scapula. An assist 15 port was placed low and anterior. Insufflation was used with a pressure of 10. Exparel blocks were performed over 8 levels using 20 ml Exparel, 20 ml NS, and 20 ml bupivicaine.     The first rib was located and the pleura and musculature was taken off it from the mammary vessels and in a posterior direction using the hook electrocautery. Muscle fibers were brushed away with the electrocautery exposing the full width of the first rib. Once the soft tissue was removed anteriorly and posteriorly the Midas Hector drill was used at the bedside to divide the rib anteriorly and posteriorly. Rongeurs were used to finish some of the bone. Once divided the dissection was continued from the inferior aspect of the rib while rolling it away from the subclavian vessels. The scalenes were divided and the subclavian vessels and brachial plexus were protected. There was significant inflammation in this area. The rib fragment was then removed from the assist port. Surgicel was placed over the subclavian artery. The ports were made hemostatic, a 24Fr Derek drain was placed through the assist port. The lung was reinflated. Access sites were closed with 3-0 vicryl, 4-0 monocryl and glue. The tube was secured with 0 prolene.     Dr. Hill was present to assist with the drilling of the rib.   TERENCE Zamora was present and assisted at the bedside with changing instruments, suctioning and closing.        I was present for the entire procedure.    Patient Disposition:  PACU              SIGNATURE: Dc DO Paige  DATE: December 13, 2024  TIME: 1:46 PM

## 2024-12-13 NOTE — ANESTHESIA POSTPROCEDURE EVALUATION
Post-Op Assessment Note    CV Status:  Stable  Pain Score: 8    Pain management: adequate       Mental Status:  Agitated   Hydration Status:  Euvolemic   PONV Controlled:  Controlled   Airway Patency:  Patent     Post Op Vitals Reviewed: Yes    No anethesia notable event occurred.    Staff: CRNA, Anesthesiologist           Last Filed PACU Vitals:  Vitals Value Taken Time   Temp 97.4 °F (36.3 °C) 12/13/24 1258   Pulse 86 12/13/24 1305   /60 12/13/24 1300   Resp 25 12/13/24 1305   SpO2 99 % 12/13/24 1305   Vitals shown include unfiled device data.    Modified Anastasiia:  Activity: 2 (12/13/2024 12:58 PM)  Respiration: 2 (12/13/2024 12:58 PM)  Circulation: 2 (12/13/2024 12:58 PM)  Consciousness: 0 (12/13/2024 12:58 PM)  Oxygen Saturation: 1 (12/13/2024 12:58 PM)  Modified Anastasiia Score: 7 (12/13/2024 12:58 PM)

## 2024-12-13 NOTE — INTERVAL H&P NOTE
H&P reviewed. After examining the patient I find no changes in the patients condition since the H&P had been written.    OR today for robotic left first rib resection.   No changes since I last saw the patient in the office.

## 2024-12-13 NOTE — PLAN OF CARE
Problem: PAIN - ADULT  Goal: Verbalizes/displays adequate comfort level or baseline comfort level  Description: Interventions:  - Encourage patient to monitor pain and request assistance  - Assess pain using appropriate pain scale  - Administer analgesics based on type and severity of pain and evaluate response  - Implement non-pharmacological measures as appropriate and evaluate response  - Consider cultural and social influences on pain and pain management  - Notify physician/advanced practitioner if interventions unsuccessful or patient reports new pain  Outcome: Progressing     Problem: INFECTION - ADULT  Goal: Absence or prevention of progression during hospitalization  Description: INTERVENTIONS:  - Assess and monitor for signs and symptoms of infection  - Monitor lab/diagnostic results  - Monitor all insertion sites, i.e. indwelling lines, tubes, and drains  - Monitor endotracheal if appropriate and nasal secretions for changes in amount and color  - [place] appropriate cooling/warming therapies per order  - Administer medications as ordered  - Instruct and encourage patient and family to use good hand hygiene technique  - Identify and instruct in appropriate isolation precautions for identified infection/condition  Outcome: Progressing  Goal: Absence of fever/infection during neutropenic period  Description: INTERVENTIONS:  - Monitor WBC    Outcome: Progressing     Problem: SAFETY ADULT  Goal: Patient will remain free of falls  Description: INTERVENTIONS:  - Educate patient/family on patient safety including physical limitations  - Instruct patient to call for assistance with activity   - Consult OT/PT to assist with strengthening/mobility   - Keep Call bell within reach  - Keep bed low and locked with side rails adjusted as appropriate  - Keep care items and personal belongings within reach  - Initiate and maintain comfort rounds  - Make Fall Risk Sign visible to staff  - Apply yellow socks and bracelet  for high fall risk patients  - Consider moving patient to room near nurses station  Outcome: Progressing  Goal: Maintain or return to baseline ADL function  Description: INTERVENTIONS:  -  Assess patient's ability to carry out ADLs; assess patient's baseline for ADL function and identify physical deficits which impact ability to perform ADLs (bathing, care of mouth/teeth, toileting, grooming, dressing, etc.)  - Assess/evaluate cause of self-care deficits   - Assess range of motion  - Assess patient's mobility; develop plan if impaired  - Assess patient's need for assistive devices and provide as appropriate  - Encourage maximum independence but intervene and supervise when necessary  - Involve family in performance of ADLs  - Assess for home care needs following discharge   - Consider OT consult to assist with ADL evaluation and planning for discharge  - Provide patient education as appropriate  Outcome: Progressing  Goal: Maintains/Returns to pre admission functional level  Description: INTERVENTIONS:  - Perform AM-PAC 6 Click Basic Mobility/ Daily Activity assessment daily.  - Set and communicate daily mobility goal to care team and patient/family/caregiver.   - Collaborate with rehabilitation services on mobility goals if consulted  - Out of bed for toileting  - Record patient progress and toleration of activity level   Outcome: Progressing     Problem: DISCHARGE PLANNING  Goal: Discharge to home or other facility with appropriate resources  Description: INTERVENTIONS:  - Identify barriers to discharge w/patient and caregiver  - Arrange for needed discharge resources and transportation as appropriate  - Identify discharge learning needs (meds, wound care, etc.)  - Arrange for interpretive services to assist at discharge as needed  - Refer to Case Management Department for coordinating discharge planning if the patient needs post-hospital services based on physician/advanced practitioner order or complex needs  related to functional status, cognitive ability, or social support system  Outcome: Progressing     Problem: Knowledge Deficit  Goal: Patient/family/caregiver demonstrates understanding of disease process, treatment plan, medications, and discharge instructions  Description: Complete learning assessment and assess knowledge base.  Interventions:  - Provide teaching at level of understanding  - Provide teaching via preferred learning methods  Outcome: Progressing

## 2024-12-13 NOTE — RESPIRATORY THERAPY NOTE
"RT Protocol Note  Jay Ortiz 24 y.o. male MRN: [id number]  Unit/Bed#: [facility] 520-01 Encounter: [id number]    Assessment    Principal Problem:    Thoracic outlet syndrome      Home Pulmonary Medications:  none       Past Medical History:   Diagnosis Date    ADHD     ADHD (attention deficit hyperactivity disorder)     Anxiety     Anxiety     Depression     Depression      Social History     Socioeconomic History    Marital status: Single     Spouse name: None    Number of children: None    Years of education: None    Highest education level: None   Occupational History    None   Tobacco Use    Smoking status: Never    Smokeless tobacco: Never   Vaping Use    Vaping status: Never Used   Substance and Sexual Activity    Alcohol use: Not Currently    Drug use: Not Currently     Types: Marijuana    Sexual activity: None   Other Topics Concern    None   Social History Narrative    ** Merged History Encounter **          Social Drivers of Health     Financial Resource Strain: Not on file   Food Insecurity: Not on file   Transportation Needs: Not on file   Physical Activity: Not on file   Stress: Not on file   Social Connections: Not on file   Intimate Partner Violence: Not on file   Housing Stability: Not on file       Subjective         Objective    Physical Exam:   Assessment Type: (P) Assess only  General Appearance: (P) Awake  Respiratory Pattern: (P) Normal  Chest Assessment: (P) Chest expansion symmetrical  Bilateral Breath Sounds: (P) Clear, Diminished    Vitals:  Blood pressure 105/57, pulse 77, temperature 97.8 °F (36.6 °C), resp. rate 18, height 5' 5\" (1.651 m), weight 62.6 kg (138 lb), SpO2 97%.          Imaging and other studies: Results Review Statement: I reviewed radiology reports from this admission including: chest xray.          Plan       Airway Clearance Plan: (P) Incentive Spirometer, Discontinue Protocol     Resp Comments: (P) pt ordered on airay clearance protocol post surgery. bs decreased but " clear chest xray shows  Left apical chest tube is present.     Clear lungs. No pneumothorax or pleural effusion.     Normal cardiomediastinal silhouette.     Status post left first rib resection. Subcutaneous emphysema in the left chest wall.     Normal upper abdomen.   pt has IS ordered no other respirator intervention is needed at [facility] danial will d/c from protocol

## 2024-12-13 NOTE — QUICK NOTE
"Post Op Check:    Procedure(s):  Robotic assisted left first rib resection    Patient seen and examined at bedside, in no acute distress. Patient reports some pain. No nausea or vomiting. Pulling 500 on IS. Tolerated clear liquids.     Vitals:Blood pressure 105/57, pulse 82, temperature 97.8 °F (36.6 °C), resp. rate 18, height 5' 5\" (1.651 m), weight 62.6 kg (138 lb), SpO2 95%.  Temp (24hrs), Av.7 °F (36.5 °C), Min:97.4 °F (36.3 °C), Max:98.1 °F (36.7 °C)      General: NAD  HENT: NCAT MMM  Neck: supple, no JVD  CV: nl rate  Lungs: nl wob. No resp distress  Chest: L CT to waterseal, no airleak   ABD: Soft, nontender, nondistended  Extrem: No CCE  Neuro: AAOx3       I/O          07 0700  0701   0700  0701   0700    I.V. (mL/kg)   1400 (22.4)    IV Piggyback   500    Total Intake(mL/kg)   1900 (30.4)    Urine (mL/kg/hr)   0 (0)    Blood   25    Total Output   25    Net   +1875           Unmeasured Urine Occurrence   1 x            Invasive Devices       Peripheral Intravenous Line  Duration             Peripheral IV 24 Right Forearm <1 day    Peripheral IV 24 Right Hand <1 day              Drain  Duration             Chest Tube 1 Left Pleural;Midaxillary 24 Fr. <1 day                      Plan:  Diet Regular; Regular House; Gluten Free  Continue to monitor chest tube output and for airleak  Keep to chest tube to waterseal  Pain and nausea control PRN    Shahriar Angulo MD  2024 3:26 PM    "

## 2024-12-13 NOTE — ANESTHESIA PREPROCEDURE EVALUATION
Medical History    History Comments   ADHD (attention deficit hyperactivity disorder)    ADHD    Depression    Depression    Anxiety    Anxiety    Procedure:  ROBOTIC LEFT FIRST RIB RESECTION (Left: Chest)    Relevant Problems   ANESTHESIA (within normal limits)      MUSCULOSKELETAL   (+) Chronic bilateral low back pain without sciatica      NEURO/PSYCH   (+) Anxiety associated with depression   (+) Chronic bilateral low back pain without sciatica        Physical Exam    Airway    Mallampati score: II  TM Distance: >3 FB  Neck ROM: full     Dental       Cardiovascular  Rate: normal    Pulmonary  Pulmonary exam normal     Other Findings  Per pt denies anything remaining that is loose or removeable      Anesthesia Plan  ASA Score- 2     Anesthesia Type- general with ASA Monitors.         Additional Monitors:     Airway Plan: ETT.           Plan Factors-Exercise tolerance (METS): >4 METS.    Chart reviewed.    Patient summary reviewed.    Patient is not a current smoker.              Induction- intravenous.    Postoperative Plan- Plan for postoperative opioid use.         Informed Consent- Anesthetic plan and risks discussed with patient.  I personally reviewed this patient with the CRNA. Discussed and agreed on the Anesthesia Plan with the CRNA..

## 2024-12-14 ENCOUNTER — APPOINTMENT (INPATIENT)
Dept: RADIOLOGY | Facility: HOSPITAL | Age: 24
DRG: 030 | End: 2024-12-14
Payer: COMMERCIAL

## 2024-12-14 LAB
ANION GAP SERPL CALCULATED.3IONS-SCNC: 6 MMOL/L (ref 4–13)
BUN SERPL-MCNC: 12 MG/DL (ref 5–25)
CALCIUM SERPL-MCNC: 8.8 MG/DL (ref 8.4–10.2)
CHLORIDE SERPL-SCNC: 105 MMOL/L (ref 96–108)
CO2 SERPL-SCNC: 29 MMOL/L (ref 21–32)
CREAT SERPL-MCNC: 0.86 MG/DL (ref 0.6–1.3)
ERYTHROCYTE [DISTWIDTH] IN BLOOD BY AUTOMATED COUNT: 13.2 % (ref 11.6–15.1)
GFR SERPL CREATININE-BSD FRML MDRD: 122 ML/MIN/1.73SQ M
GLUCOSE SERPL-MCNC: 101 MG/DL (ref 65–140)
HCT VFR BLD AUTO: 35.6 % (ref 36.5–49.3)
HGB BLD-MCNC: 12.2 G/DL (ref 12–17)
MAGNESIUM SERPL-MCNC: 2 MG/DL (ref 1.9–2.7)
MCH RBC QN AUTO: 32.5 PG (ref 26.8–34.3)
MCHC RBC AUTO-ENTMCNC: 34.3 G/DL (ref 31.4–37.4)
MCV RBC AUTO: 95 FL (ref 82–98)
PLATELET # BLD AUTO: 200 THOUSANDS/UL (ref 149–390)
PMV BLD AUTO: 8.9 FL (ref 8.9–12.7)
POTASSIUM SERPL-SCNC: 3.8 MMOL/L (ref 3.5–5.3)
RBC # BLD AUTO: 3.75 MILLION/UL (ref 3.88–5.62)
SODIUM SERPL-SCNC: 140 MMOL/L (ref 135–147)
WBC # BLD AUTO: 11.48 THOUSAND/UL (ref 4.31–10.16)

## 2024-12-14 PROCEDURE — 71046 X-RAY EXAM CHEST 2 VIEWS: CPT

## 2024-12-14 PROCEDURE — 85027 COMPLETE CBC AUTOMATED: CPT

## 2024-12-14 PROCEDURE — 83735 ASSAY OF MAGNESIUM: CPT

## 2024-12-14 PROCEDURE — 80048 BASIC METABOLIC PNL TOTAL CA: CPT

## 2024-12-14 PROCEDURE — 99024 POSTOP FOLLOW-UP VISIT: CPT | Performed by: THORACIC SURGERY (CARDIOTHORACIC VASCULAR SURGERY)

## 2024-12-14 RX ADMIN — ENOXAPARIN SODIUM 40 MG: 40 INJECTION SUBCUTANEOUS at 08:40

## 2024-12-14 RX ADMIN — CELECOXIB 100 MG: 100 CAPSULE ORAL at 08:39

## 2024-12-14 RX ADMIN — CELECOXIB 100 MG: 100 CAPSULE ORAL at 17:25

## 2024-12-14 RX ADMIN — OXYCODONE HYDROCHLORIDE 5 MG: 5 TABLET ORAL at 06:14

## 2024-12-14 RX ADMIN — ACETAMINOPHEN 975 MG: 325 TABLET, FILM COATED ORAL at 00:34

## 2024-12-14 RX ADMIN — FAMOTIDINE 20 MG: 20 TABLET, FILM COATED ORAL at 17:25

## 2024-12-14 RX ADMIN — GABAPENTIN 300 MG: 300 CAPSULE ORAL at 08:41

## 2024-12-14 RX ADMIN — TRAZODONE HYDROCHLORIDE 50 MG: 50 TABLET ORAL at 21:51

## 2024-12-14 RX ADMIN — DOCUSATE SODIUM 100 MG: 100 CAPSULE, LIQUID FILLED ORAL at 17:25

## 2024-12-14 RX ADMIN — ACETAMINOPHEN 975 MG: 325 TABLET, FILM COATED ORAL at 12:15

## 2024-12-14 RX ADMIN — TRAZODONE HYDROCHLORIDE 50 MG: 50 TABLET ORAL at 00:33

## 2024-12-14 RX ADMIN — OXYCODONE HYDROCHLORIDE 2.5 MG: 5 TABLET ORAL at 14:03

## 2024-12-14 RX ADMIN — HYDROMORPHONE HYDROCHLORIDE 0.5 MG: 1 INJECTION, SOLUTION INTRAMUSCULAR; INTRAVENOUS; SUBCUTANEOUS at 07:05

## 2024-12-14 RX ADMIN — FAMOTIDINE 20 MG: 20 TABLET, FILM COATED ORAL at 08:40

## 2024-12-14 RX ADMIN — ACETAMINOPHEN 975 MG: 325 TABLET, FILM COATED ORAL at 06:15

## 2024-12-14 RX ADMIN — GABAPENTIN 300 MG: 300 CAPSULE ORAL at 21:51

## 2024-12-14 RX ADMIN — SERTRALINE HYDROCHLORIDE 50 MG: 50 TABLET ORAL at 08:40

## 2024-12-14 RX ADMIN — SODIUM CHLORIDE, SODIUM LACTATE, POTASSIUM CHLORIDE, AND CALCIUM CHLORIDE 60 ML/HR: .6; .31; .03; .02 INJECTION, SOLUTION INTRAVENOUS at 08:42

## 2024-12-14 RX ADMIN — GABAPENTIN 300 MG: 300 CAPSULE ORAL at 17:25

## 2024-12-14 RX ADMIN — ACETAMINOPHEN 975 MG: 325 TABLET, FILM COATED ORAL at 18:42

## 2024-12-14 NOTE — QUICK NOTE
12/14/24    Procedure: Chest tube removal    L chest tube removed in routine fashion without incident. The patient tolerated the procedure well. A dry, sterile dressing was placed. Will check a PA/lateral chest x-ray.   [place] unit 1 returned to p5 ICU soiled utility    Victorina Cornell MD

## 2024-12-14 NOTE — PLAN OF CARE
Problem: PAIN - ADULT  Goal: Verbalizes/displays adequate comfort level or baseline comfort level  Description: Interventions:  - Encourage patient to monitor pain and request assistance  - Assess pain using appropriate pain scale  - Administer analgesics based on type and severity of pain and evaluate response  - Implement non-pharmacological measures as appropriate and evaluate response  - Consider cultural and social influences on pain and pain management  - Notify physician/advanced practitioner if interventions unsuccessful or patient reports new pain  Outcome: Progressing     Problem: INFECTION - ADULT  Goal: Absence or prevention of progression during hospitalization  Description: INTERVENTIONS:  - Assess and monitor for signs and symptoms of infection  - Monitor lab/diagnostic results  - Monitor all insertion sites, i.e. indwelling lines, tubes, and drains  - Monitor endotracheal if appropriate and nasal secretions for changes in amount and color  - Chinle appropriate cooling/warming therapies per order  - Administer medications as ordered  - Instruct and encourage patient and family to use good hand hygiene technique  - Identify and instruct in appropriate isolation precautions for identified infection/condition  Outcome: Progressing  Goal: Absence of fever/infection during neutropenic period  Description: INTERVENTIONS:  - Monitor WBC    Outcome: Progressing     Problem: SAFETY ADULT  Goal: Patient will remain free of falls  Description: INTERVENTIONS:  - Educate patient/family on patient safety including physical limitations  - Instruct patient to call for assistance with activity   - Consult OT/PT to assist with strengthening/mobility   - Keep Call bell within reach  - Keep bed low and locked with side rails adjusted as appropriate  - Keep care items and personal belongings within reach  - Initiate and maintain comfort rounds  - Make Fall Risk Sign visible to staff  - Offer Toileting every  Hours,  in advance of need  - Initiate/Maintain alarm  - Obtain necessary fall risk management equipment:   - Apply yellow socks and bracelet for high fall risk patients  - Consider moving patient to room near nurses station  Outcome: Progressing  Goal: Maintain or return to baseline ADL function  Description: INTERVENTIONS:  -  Assess patient's ability to carry out ADLs; assess patient's baseline for ADL function and identify physical deficits which impact ability to perform ADLs (bathing, care of mouth/teeth, toileting, grooming, dressing, etc.)  - Assess/evaluate cause of self-care deficits   - Assess range of motion  - Assess patient's mobility; develop plan if impaired  - Assess patient's need for assistive devices and provide as appropriate  - Encourage maximum independence but intervene and supervise when necessary  - Involve family in performance of ADLs  - Assess for home care needs following discharge   - Consider OT consult to assist with ADL evaluation and planning for discharge  - Provide patient education as appropriate  Outcome: Progressing  Goal: Maintains/Returns to pre admission functional level  Description: INTERVENTIONS:  - Perform AM-PAC 6 Click Basic Mobility/ Daily Activity assessment daily.  - Set and communicate daily mobility goal to care team and patient/family/caregiver.   - Collaborate with rehabilitation services on mobility goals if consulted  - Perform Range of Motion  times a day.  - Reposition patient every hours.  - Dangle patient  times a day  - Stand patient  times a day  - Ambulate patient  times a day  - Out of bed to chair  times a day   - Out of bed for meals times a day  - Out of bed for toileting  - Record patient progress and toleration of activity level   Outcome: Progressing     Problem: DISCHARGE PLANNING  Goal: Discharge to home or other facility with appropriate resources  Description: INTERVENTIONS:  - Identify barriers to discharge w/patient and caregiver  - Arrange for  needed discharge resources and transportation as appropriate  - Identify discharge learning needs (meds, wound care, etc.)  - Arrange for interpretive services to assist at discharge as needed  - Refer to Case Management Department for coordinating discharge planning if the patient needs post-hospital services based on physician/advanced practitioner order or complex needs related to functional status, cognitive ability, or social support system  Outcome: Progressing     Problem: Knowledge Deficit  Goal: Patient/family/caregiver demonstrates understanding of disease process, treatment plan, medications, and discharge instructions  Description: Complete learning assessment and assess knowledge base.  Interventions:  - Provide teaching at level of understanding  - Provide teaching via preferred learning methods  Outcome: Progressing

## 2024-12-14 NOTE — PROGRESS NOTES
Progress Note - Surgery-General   Name: Jay Ortiz 24 y.o. male I MRN: [id number]  Unit/Bed#: [facility] 520-01 I Date of Admission: 12/13/2024   Date of Service: 12/14/2024 I Hospital Day: 1     Assessment & Plan  Thoracic outlet syndrome  12/13 status post robotic first rib resection, clinically improving    Chest tube output 0 cc serosanguineous, no airleak, -2    WBC 11.5 from 7  Hemoglobin 12.2 from 14  Creatinine 0.86    Plan  -Diet as tolerated  -Discontinue chest tube today  -Encourage incentive spirometer use  -Multimodal pain regimen  -Encourage ambulation/out of bed, 3 times daily  -Wean oxygen as tolerated    Surgery-General service will follow.    Subjective   Patient seen and examined at bedside.  Patient expressed no concerns.  Patient reports his pain is controlled at this time.  Patient tolerated diet.  Patient denies nausea vomiting.  Patient is passing flatus no bowel movements.  Patient is urinating.    Objective :  Temp:  [97.4 °F (36.3 °C)-98.1 °F (36.7 °C)] 98 °F (36.7 °C)  HR:  [] 69  BP: ()/(51-67) 101/60  Resp:  [18-27] 18  SpO2:  [95 %-98 %] 98 %  O2 Device: Nasal cannula    I/O         12/12 0701  12/13 0700 12/13 0701  12/14 0700    P.O.  720    I.V. (mL/kg)  2257 (34.8)    IV Piggyback  500    Total Intake(mL/kg)  3477 (53.6)    Urine (mL/kg/hr)  600 (0.4)    Blood  25    Chest Tube  100    Total Output  725    Net  +2752          Unmeasured Urine Occurrence  1 x          Lines/Drains/Airways       Active Status       Name Placement date Placement time Site Days    Chest Tube 1 Left Pleural;Midaxillary 24 Fr. 12/13/24  1216  Pleural;Midaxillary  less than 1                  Physical Exam  Vitals and nursing note reviewed.   Constitutional:       General: He is not in acute distress.     Appearance: Normal appearance. He is not ill-appearing or toxic-appearing.   HENT:      Head: Normocephalic and atraumatic.   Eyes:      General: No scleral icterus.     Conjunctiva/sclera:  Conjunctivae normal.   Pulmonary:      Effort: Pulmonary effort is normal.      Comments: 2L NC  Left-sided chest tube with minimal output, serosanguineous no airleak  Abdominal:      General: Abdomen is flat. There is no distension.      Palpations: Abdomen is soft.      Tenderness: There is no abdominal tenderness. There is no guarding or rebound.   Musculoskeletal:      Right lower leg: No edema.      Left lower leg: No edema.   Skin:     General: Skin is warm and dry.      Capillary Refill: Capillary refill takes less than 2 seconds.   Neurological:      Mental Status: He is alert and oriented to person, place, and time.   Psychiatric:         Mood and Affect: Mood normal.         Behavior: Behavior normal.         Thought Content: Thought content normal.           Lab Results: I have reviewed the following results:  Recent Labs     12/14/24  0435   WBC 11.48*   HGB 12.2   HCT 35.6*      SODIUM 140   K 3.8      CO2 29   BUN 12   CREATININE 0.86   GLUC 101   MG 2.0       Imaging Results Review: No pertinent imaging studies reviewed.  Other Study Results Review: No additional pertinent studies reviewed.    VTE Pharmacologic Prophylaxis: VTE covered by:  enoxaparin, Subcutaneous     VTE Mechanical Prophylaxis: sequential compression device

## 2024-12-14 NOTE — UTILIZATION REVIEW
Initial Clinical Review    Elective Inpatient surgical procedure  Age/Sex: 24 y.o. male  Surgery Date: 12/13/24  Procedure: Robotic assisted left first rib resection   Anesthesia: general   Operative Findings: Inflammation overlying the first rib     POD#1 Progress Note:  Doing okay. Pain control is moderate. On 2L O2 via NC  Left-sided chest tube with minimal output, serosanguineous no airleak. Diet as tolerated. Discontinue chest tube today. Encourage incentive spirometer use. Multimodal pain regimen. Encourage ambulation/out of bed, 3 times daily. Wean oxygen as tolerated. Daily dsg changes.     Admission Orders: Date/Time/Statement:   Admission Orders (From admission, onward)       Ordered        12/13/24 1305  Inpatient Admission  Once                          Orders Placed This Encounter   Procedures    Inpatient Admission     Standing Status:   Standing     Number of Occurrences:   1     Level of Care:   Med Surg [16]     Bed request comments:   p5     Estimated length of stay:   Inpatient Only Surgery     Diet: regular  Mobility: OOB and ambulatory  DVT Prophylaxis: lovenox, scd, ambulation    Medications/Pain Control:   Scheduled Medications:  acetaminophen, 975 mg, Oral, Q6H  celecoxib, 100 mg, Oral, BID  docusate sodium, 100 mg, Oral, BID  enoxaparin, 40 mg, Subcutaneous, Daily  famotidine, 20 mg, Oral, BID  gabapentin, 300 mg, Oral, TID  senna, 1 tablet, Oral, Daily  sertraline, 50 mg, Oral, Daily  traZODone, 50 mg, Oral, HS      Continuous IV Infusions: none     PRN Meds:  HYDROmorphone, 0.5 mg, Intravenous, Q3H PRN  ondansetron, 4 mg, Intravenous, Q6H PRN  oxyCODONE, 2.5 mg, Oral, Q4H PRN  oxyCODONE, 5 mg, Oral, Q4H PRN  polyethylene glycol, 17 g, Oral, Daily PRN      Vital Signs (last 3 days)       Date/Time Temp Pulse Resp BP MAP (mmHg) SpO2 O2 Flow Rate (L/min) O2 Device Cardiac (WDL) Calixto Coma Scale Score Pain    12/14/24 0839 -- -- -- -- -- -- -- -- -- -- No Pain    12/14/24 07:40:53 97.1 °F  (36.2 °C) 66 18 104/53 70 97 % -- -- -- -- --    12/14/24 0705 -- -- -- -- -- -- -- -- -- -- 10 - Worst Possible Pain    12/14/24 0614 -- -- -- -- -- -- -- -- -- -- 9    12/14/24 00:37:17 98 °F (36.7 °C) 69 -- -- -- 98 % -- -- -- -- --    12/14/24 0034 -- -- -- -- -- -- -- -- -- -- 5    12/13/24 2345 -- 67 -- 101/60 74 98 % -- -- -- -- --    12/13/24 2004 -- -- -- -- -- -- 2 L/min Nasal cannula -- -- 3    12/13/24 1830 98 °F (36.7 °C) -- -- -- -- -- -- -- -- -- --    12/13/24 1810 -- 77 -- 109/64 79 97 % -- -- -- -- --    12/13/24 1700 -- -- -- -- -- -- -- -- -- 15 3    12/13/24 1610 -- 74 -- 103/59 74 97 % -- -- -- -- --    12/13/24 1540 -- 86 -- 104/57 73 97 % -- -- -- -- --    12/13/24 1520 -- 77 -- 105/57 73 97 % -- -- -- -- 8    12/13/24 15:11:52 97.8 °F (36.6 °C) 82 18 105/57 73 95 % -- -- -- -- --    12/13/24 1510 97.8 °F (36.6 °C) 84 -- 105/57 73 97 % -- -- -- -- --    12/13/24 14:38:27 98.1 °F (36.7 °C) 88 -- 107/58 74 96 % -- -- -- -- --    12/13/24 1416 97.7 °F (36.5 °C) 88 21 104/51 73 98 % 2 L/min Nasal cannula -- -- --    12/13/24 1415 -- 87 22 104/51 73 98 % 2 L/min Nasal cannula WDL 15 --    12/13/24 1400 -- 92 18 97/51 70 97 % -- -- -- -- --    12/13/24 1348 -- 90 24 127/62 86 98 % 2 L/min Nasal cannula -- 15 --    12/13/24 1343 -- 86 25 136/67 92 98 % -- -- WDL 15 --    12/13/24 1339 -- 92 27 134/66 87 98 % -- -- -- -- --    12/13/24 1337 -- -- -- -- -- -- -- -- -- -- 7    12/13/24 1330 -- -- -- -- -- -- -- -- -- -- 7    12/13/24 1328 97.7 °F (36.5 °C) 97 26 134/66 87 -- 2 L/min Nasal cannula WDL 15 7    12/13/24 1321 -- -- -- -- -- -- -- -- -- -- 7    12/13/24 1316 -- -- -- -- -- -- -- -- -- -- 7    12/13/24 1313 97.4 °F (36.3 °C) 93 26 118/58 82 95 % -- None (Room air) WDL 14 7    12/13/24 1258 97.4 °F (36.3 °C) 100 25 133/62 89 97 % 6 L/min Simple mask WDL 8 --    12/13/24 0627 97.7 °F (36.5 °C) 63 18 106/69 -- 98 % -- None (Room air) -- -- No Pain          Weight (last 2 days)       Date/Time  Weight    12/14/24 0600 64.9 (143.08)    12/13/24 1830 62.6 (138)    12/13/24 0627 62.6 (138)            Pertinent Labs/Diagnostic Test Results:   Radiology:  XR chest portable   Final Interpretation by Lane Lyons MD (12/13 1432)      No appreciable pneumothorax. Surgical changes to the left chest.            Workstation performed: [id number]         XR chest pa & lateral    (Results Pending)     Cardiology:  No orders to display     GI:  No orders to display           Results from last 7 days   Lab Units 12/14/24  0435 12/13/24  0624   WBC Thousand/uL 11.48* 7.23   HEMOGLOBIN g/dL 12.2 14.0   HEMATOCRIT % 35.6* 39.2   PLATELETS Thousands/uL 200 242         Results from last 7 days   Lab Units 12/14/24  0435   SODIUM mmol/L 140   POTASSIUM mmol/L 3.8   CHLORIDE mmol/L 105   CO2 mmol/L 29   ANION GAP mmol/L 6   BUN mg/dL 12   CREATININE mg/dL 0.86   EGFR ml/min/1.73sq m 122   CALCIUM mg/dL 8.8   MAGNESIUM mg/dL 2.0             Results from last 7 days   Lab Units 12/14/24  0435   GLUCOSE RANDOM mg/dL 101     Network Utilization Review Department  ATTENTION: Please call with any questions or concerns to [phone] and carefully listen to the prompts so that you are directed to the right person. All voicemails are confidential.   For Discharge needs, contact Care Management DC Support Team at [phone] opt. 2  Send all requests for admission clinical reviews, approved or denied determinations and any other requests to dedicated fax number below belonging to the campus where the patient is receiving treatment. List of dedicated fax numbers for the Facilities:  FACILITY NAME UR FAX NUMBER   ADMISSION DENIALS (Administrative/Medical Necessity) [phone]   DISCHARGE SUPPORT TEAM (NETWORK) [phone]   PARENT CHILD HEALTH (Maternity/NICU/Pediatrics) [phone]   [facility] [phone]   [facility] [phone]   Critical access hospital  [phone]   [facility] [phone]   [facility] [phone]   [facility] [phone]   [facility] [phone]   [facility] [phone]   [facility] [phone]   [facility] [phone]   [facility] [phone]   [facility] [phone]

## 2024-12-14 NOTE — ASSESSMENT & PLAN NOTE
12/13 status post robotic first rib resection, clinically improving    Chest tube output 0 cc serosanguineous, no airleak, -2    WBC 11.5 from 7  Hemoglobin 12.2 from 14  Creatinine 0.86    Plan  -Diet as tolerated  -Discontinue chest tube today  -Encourage incentive spirometer use  -Multimodal pain regimen  -Encourage ambulation/out of bed, 3 times daily  -Wean oxygen as tolerated

## 2024-12-14 NOTE — CASE MANAGEMENT
Case Management Assessment & Discharge Planning Note    Patient name Jay Ortiz  Location [facility] MRN [id number]  : 2000 Date 2024       Current Admission Date: 2024  Current Admission Diagnosis:Thoracic outlet syndrome   Patient Active Problem List    Diagnosis Date Noted Date Diagnosed    Anxiety associated with depression 2024     Chronic bilateral low back pain without sciatica 2024     Spinal stenosis of lumbosacral region 2024     Elevated bilirubin 2024     Thoracic outlet syndrome 07/15/2024     Irritable bowel syndrome with constipation 2024       LOS (days): 1  Geometric Mean LOS (GMLOS) (days):   Days to GMLOS:     OBJECTIVE:    Risk of Unplanned Readmission Score: 4.19       Current admission status: Inpatient       Preferred Pharm[facility]:   [id number] [address] [facility] [address]  [address] [place] [id number]  Phone: [phone] Fax: [phone]    Primary Care Provider: Low Zaidi DO    Primary Insurance: BLUE CROSS  Secondary Insurance:     ASSESSMENT:  Active Health Care Proxies       Michelle Ortiz Health Care Representative - Mother, Legal Guardian   Primary Phone: [phone] (Mobile)  Home Phone: [phone]                 Readmission Root Cause  30 Day Readmission: No    Patient Information  Admitted from:: Home  Mental Status: Alert  During Assessment patient was accompanied by: Parent  Assessment information provided by:: Patient, Parent  Primary Caregiver: Self  Support Systems: Self, Spouse/significant other  County of Residence: [place]  What city do you live in?: [place]  Home entry access options. Select all that apply.: Stairs  Number of steps to enter home.: 1  Do the steps have railings?: No  Type of Current Residence: [facility]  Living Arrangements: Lives w/ Parent(s)  Is patient a ?: No    Activities of Daily Living Prior to  Admission  Functional Status: Independent  Completes ADLs independently?: Yes  Does patient use assisted devices?: No  Does patient currently own DME?: Yes  What DME does the patient currently own?: Walker  Does patient have a history of Outpatient Therapy (PT/OT)?: Yes  Does the patient have a history of Short-Term Rehab?: No  Does patient have a history of HHC?: No  Does patient currently have HHC?: No         Patient Information Continued  Income Source: Employed  Does patient have prescription coverage?: Yes  Does patient receive dialysis treatments?: No  Does patient have a history of substance abuse?: No (Pt denies)  Does patient have a history of Mental Health Diagnosis?: Yes (Anxiety)  Has patient received inpatient treatment related to mental health in the last 2 years?: No         Means of Transportation  Means of Transport to Appts:: Drives Self    DISCHARGE DETAILS:    Discharge planning discussed with:: Patient and his Mom        CM contacted family/caregiver?: Yes  Were Treatment Team discharge recommendations reviewed with patient/caregiver?: Yes  Did patient/caregiver verbalize understanding of patient care needs?: Yes       Contacts  Patient Contacts: Michelle Ortiz  Relationship to Patient:: Family  Contact Method: In Person  Reason/Outcome: Continuity of Care, Emergency Contact, Discharge Planning    Requested Home Health Care         Is the patient interested in HHC at discharge?: No    DME Referral Provided  Referral made for DME?: No    Other Referral/Resources/Interventions Provided:  Referral Comments: Pt admitted iwth thoracic outlet syndrom now s/p resection of left first rib.  Pt has PT/OT established out patient.  They want to return to OP PT/OT if recommended. Pt Mom can provide transportation on discharge. DC anticipated Sundy.

## 2024-12-15 VITALS
DIASTOLIC BLOOD PRESSURE: 61 MMHG | TEMPERATURE: 98 F | OXYGEN SATURATION: 97 % | HEART RATE: 89 BPM | WEIGHT: 143.74 LBS | RESPIRATION RATE: 19 BRPM | SYSTOLIC BLOOD PRESSURE: 109 MMHG | HEIGHT: 65 IN | BODY MASS INDEX: 23.95 KG/M2

## 2024-12-15 PROCEDURE — 99024 POSTOP FOLLOW-UP VISIT: CPT | Performed by: THORACIC SURGERY (CARDIOTHORACIC VASCULAR SURGERY)

## 2024-12-15 PROCEDURE — NC001 PR NO CHARGE: Performed by: THORACIC SURGERY (CARDIOTHORACIC VASCULAR SURGERY)

## 2024-12-15 RX ORDER — ACETAMINOPHEN 325 MG/1
650 TABLET ORAL EVERY 6 HOURS
Start: 2024-12-15 | End: 2024-12-22

## 2024-12-15 RX ORDER — SENNOSIDES 8.6 MG
8.6 TABLET ORAL DAILY
Qty: 30 TABLET | Refills: 0 | Status: SHIPPED | OUTPATIENT
Start: 2024-12-16 | End: 2024-12-19

## 2024-12-15 RX ORDER — OXYCODONE HYDROCHLORIDE 5 MG/1
5 TABLET ORAL EVERY 6 HOURS PRN
Qty: 12 TABLET | Refills: 0 | Status: SHIPPED | OUTPATIENT
Start: 2024-12-15 | End: 2024-12-18

## 2024-12-15 RX ORDER — DOCUSATE SODIUM 100 MG/1
100 CAPSULE, LIQUID FILLED ORAL 2 TIMES DAILY
Qty: 60 CAPSULE | Refills: 0 | Status: SHIPPED | OUTPATIENT
Start: 2024-12-15 | End: 2024-12-19

## 2024-12-15 RX ORDER — IBUPROFEN 600 MG/1
600 TABLET, FILM COATED ORAL EVERY 8 HOURS SCHEDULED
Start: 2024-12-15 | End: 2024-12-19

## 2024-12-15 RX ORDER — GABAPENTIN 300 MG/1
300 CAPSULE ORAL 3 TIMES DAILY
Qty: 63 CAPSULE | Refills: 0 | Status: SHIPPED | OUTPATIENT
Start: 2024-12-15 | End: 2024-12-16 | Stop reason: SDUPTHER

## 2024-12-15 RX ADMIN — SENNOSIDES 8.6 MG: 8.6 TABLET, FILM COATED ORAL at 08:26

## 2024-12-15 RX ADMIN — CELECOXIB 100 MG: 100 CAPSULE ORAL at 08:26

## 2024-12-15 RX ADMIN — SERTRALINE HYDROCHLORIDE 50 MG: 50 TABLET ORAL at 08:26

## 2024-12-15 RX ADMIN — ENOXAPARIN SODIUM 40 MG: 40 INJECTION SUBCUTANEOUS at 08:25

## 2024-12-15 RX ADMIN — ACETAMINOPHEN 975 MG: 325 TABLET, FILM COATED ORAL at 09:23

## 2024-12-15 RX ADMIN — ACETAMINOPHEN 975 MG: 325 TABLET, FILM COATED ORAL at 00:36

## 2024-12-15 RX ADMIN — FAMOTIDINE 20 MG: 20 TABLET, FILM COATED ORAL at 08:26

## 2024-12-15 RX ADMIN — DOCUSATE SODIUM 100 MG: 100 CAPSULE, LIQUID FILLED ORAL at 08:26

## 2024-12-15 RX ADMIN — GABAPENTIN 300 MG: 300 CAPSULE ORAL at 08:26

## 2024-12-15 NOTE — PROGRESS NOTES
Progress Note - Thoracic    Name: Jay Ortiz 24 y.o. male I MRN: [id number]  Unit/Bed#: [facility]-01 I Date of Admission: 12/13/2024   Date of Service: 12/15/2024 I Hospital Day: 2     Assessment & Plan  Thoracic outlet syndrome  S/p 12/13 robotic first rib resection  CT removed yesterday, post pull CXR reviewed    Plan:  Dispo  F/u outpatient  Will need interval CT prior to outpatient appointment  PRN pain control          Subjective   Patient seen and examined bedside.  Pain well controlled.  No overnight events.      Objective :  Temp:  [98 °F (36.7 °C)-99.4 °F (37.4 °C)] 98 °F (36.7 °C)  HR:  [] 89  BP: (107-109)/(60-61) 109/61  Resp:  [16-19] 19  SpO2:  [90 %-98 %] 98 %  O2 Device: None (Room air)    I/O         12/12 0701  12/13 0700 12/13 0701 12/14 0700    P.O.  720    I.V. (mL/kg)  2257 (34.8)    IV Piggyback  500    Total Intake(mL/kg)  3477 (53.6)    Urine (mL/kg/hr)  600 (0.4)    Blood  25    Chest Tube  100    Total Output  725    Net  +2752          Unmeasured Urine Occurrence  1 x          Lines/Drains/Airways       Active Status       Name Placement date Placement time Site Days    Chest Tube 1 Left Pleural;Midaxillary 24 Fr. 12/13/24  1216  Pleural;Midaxillary  1                  Physical Exam  Vitals reviewed.   Constitutional:       General: He is not in acute distress.     Appearance: Normal appearance. He is not ill-appearing or toxic-appearing.   HENT:      Head: Normocephalic and atraumatic.   Eyes:      General: No scleral icterus.     Conjunctiva/sclera: Conjunctivae normal.   Pulmonary:      Effort: Pulmonary effort is normal.      Comments: ORA, left chest wall incision c/d/i  Abdominal:      General: Abdomen is flat. There is no distension.      Palpations: Abdomen is soft.      Tenderness: There is no abdominal tenderness. There is no guarding or rebound.   Musculoskeletal:      Right lower leg: No edema.      Left lower leg: No edema.   Skin:     General: Skin is warm and  dry.      Capillary Refill: Capillary refill takes less than 2 seconds.   Neurological:      Mental Status: He is alert and oriented to person, place, and time.   Psychiatric:         Mood and Affect: Mood normal.         Behavior: Behavior normal.         Thought Content: Thought content normal.           Lab Results: I have reviewed the following results:  Recent Labs     12/14/24  0435   WBC 11.48*   HGB 12.2   HCT 35.6*      SODIUM 140   K 3.8      CO2 29   BUN 12   CREATININE 0.86   GLUC 101   MG 2.0       Imaging Results Review: No pertinent imaging studies reviewed.  Other Study Results Review: No additional pertinent studies reviewed.    VTE Pharmacologic Prophylaxis: VTE covered by:  enoxaparin, Subcutaneous, 40 mg at 12/14/24 0840      VTE Mechanical Prophylaxis: sequential compression device

## 2024-12-15 NOTE — NURSING NOTE
Patient discharged via wheelchair by RN, accompanied by his mother. AVS reviewed with patient and mother and all questions addressed. All belongings taken with patient. New prescriptions to be picked up at home pharmacy.
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
## 2024-12-15 NOTE — ASSESSMENT & PLAN NOTE
S/p 12/13 robotic first rib resection  CT removed yesterday, post pull CXR reviewed    Plan:  Dispo  F/u outpatient  Will need interval CT prior to outpatient appointment  PRN pain control

## 2024-12-15 NOTE — DISCHARGE INSTR - AVS FIRST PAGE
VATS (Video-assisted Thoracoscopic Surgery)    Robotic Thoracoscopy L first rib resection    You underwent a minimally invasive thoracic surgery. Below are your discharge instructions.     Incision Care:  - Your incisions were closed with stitches underneath of the skin which will dissolve. There is purple surgical glue on top of the incisions. The surgical glue will flake off over the next few weeks. There is a non-dissolvable stitch at your chest tube site which will be removed in the office.   - After your chest tube was removed, a gauze dressing was placed over the incision. This gauze can be removed in 24 hours. After this time you may place some gauze over your chest tube site if it is leaking some fluid.  - You do not need dressings over your other incisions.  Do not apply any cream or ointments to the incisions unless instructed by your surgeon.  - You may shower daily - no soaking in a tub bath. Wash your incisions gently with soap and water and pat dry. Do not scrub the incisions.  - Bruising at your incisions is normal. This will resolve over the next few weeks.     Activity  - No lifting greater than 10lbs until you are evaluated in the office.   - Walking and light activity is encouraged. Recommend you are active during the day and using your Incentive Spirometer when you are sitting for a prolonged period.   - No driving while you are using narcotic pain medications. If you are no longer taking narcotics and considering driving, you must make sure you can quickly react to changes on the road. This can be challenging in the first few weeks after surgery.     Pain:  - Some degree of pain or discomfort after surgery is expected. This pain may become more noticeable after discharge as you start moving around more.   - Your pain regiment includes the following:   - Tylenol 650 mg tablet. Take 1 tablet, every 6 hours for 1 week.     - Gabapentin 300 mg tablet. Take 1 tablet, 3x a day for 3 weeks.   -  Ibuprofen 600 mg tablet, every 8 hours for 1 week.    - Oxycodone (Narcotic) 5mg tablet. Take 1 tablet, every 4-6 hours as needed for pain.     Medications:  - Continue on your home medications including any blood thinner and aspirin, as instructed.     Diet:  - You should continue on your normal diet.     Bowel Regiment:  - Constipation after surgery while on narcotic pain medications is normal.  - You should continue taking a bowel regiment while on a narcotic pain medication to keep your bowel movements soft. Your discharge bowel regiment:   - Docusate (Colace) 100mg tablet. Take 1 tablet, twice a day.    - Senna 8.6mg tablet. Take 1 tablet daily.   - If your bowel movements become lose, stop taking the bowel regiment above.     Follow-up:  - You have a scheduled follow-up appointment on: 12/30  - Obtain your Chest X-ray prior to your scheduled post-operative appointment.   - A couple of days after discharge our office will call you to check in with how you are recovering at home.     Concerns:  - Call the office for any questions or concerns. Many postoperative issues can be sorted out over the phone.   - Call the office or go to the Emergency Department if you develop a fever greater than 101, persistent chills, persistent nausea/vomiting, worsening or uncontrolled pain, and/or increasing redness or foul smelling drainage from an incision.     Thoracic Surgery Office: [phone]    Dr. William Burfeind, MD Rachael Hart, PA-C Dr. Meredith Harrison, MD Amylyn Mortimer, PA-C Dr. Dustin Manchester, MD Dr. Stephen Dingley, DO

## 2024-12-15 NOTE — PLAN OF CARE
Problem: PAIN - ADULT  Goal: Verbalizes/displays adequate comfort level or baseline comfort level  Description: Interventions:  - Encourage patient to monitor pain and request assistance  - Assess pain using appropriate pain scale  - Administer analgesics based on type and severity of pain and evaluate response  - Implement non-pharmacological measures as appropriate and evaluate response  - Consider cultural and social influences on pain and pain management  - Notify physician/advanced practitioner if interventions unsuccessful or patient reports new pain  Outcome: Progressing     Problem: SAFETY ADULT  Goal: Patient will remain free of falls  Description: INTERVENTIONS:  - Educate patient/family on patient safety including physical limitations  - Instruct patient to call for assistance with activity   - Consult OT/PT to assist with strengthening/mobility   - Keep Call bell within reach  - Keep bed low and locked with side rails adjusted as appropriate  - Keep care items and personal belongings within reach  - Initiate and maintain comfort rounds  Problem: DISCHARGE PLANNING  Goal: Discharge to home or other facility with appropriate resources  Description: INTERVENTIONS:  - Identify barriers to discharge w/patient and caregiver  - Arrange for needed discharge resources and transportation as appropriate  - Identify discharge learning needs (meds, wound care, etc.)  - Arrange for interpretive services to assist at discharge as needed  - Refer to Case Management Department for coordinating discharge planning if the patient needs post-hospital services based on physician/advanced practitioner order or complex needs related to functional status, cognitive ability, or social support system  Outcome: Progressing     - Apply yellow socks and bracelet for high fall risk patients  - Consider moving patient to room near nurses station  Outcome: Progressing  Goal: Maintain or return to baseline ADL function  Description:  INTERVENTIONS:  -  Assess patient's ability to carry out ADLs; assess patient's baseline for ADL function and identify physical deficits which impact ability to perform ADLs (bathing, care of mouth/teeth, toileting, grooming, dressing, etc.)  - Assess/evaluate cause of self-care deficits   - Assess range of motion  - Assess patient's mobility; develop plan if impaired  - Assess patient's need for assistive devices and provide as appropriate  - Encourage maximum independence but intervene and supervise when necessary  - Involve family in performance of ADLs  - Assess for home care needs following discharge   - Consider OT consult to assist with ADL evaluation and planning for discharge  - Provide patient education as appropriate  Outcome: Progressing

## 2024-12-16 ENCOUNTER — NURSE TRIAGE (OUTPATIENT)
Dept: OTHER | Facility: OTHER | Age: 24
End: 2024-12-16

## 2024-12-16 ENCOUNTER — TRANSITIONAL CARE MANAGEMENT (OUTPATIENT)
Dept: FAMILY MEDICINE CLINIC | Facility: CLINIC | Age: 24
End: 2024-12-16

## 2024-12-16 DIAGNOSIS — G54.0 THORACIC OUTLET SYNDROME: ICD-10-CM

## 2024-12-16 RX ORDER — GABAPENTIN 300 MG/1
300 CAPSULE ORAL 3 TIMES DAILY
Qty: 63 CAPSULE | Refills: 0 | Status: SHIPPED | OUTPATIENT
Start: 2024-12-16 | End: 2025-01-06

## 2024-12-16 NOTE — TELEPHONE ENCOUNTER
Resent gabapentin to pt's pharmacy as pharmacy had issue filling it as is.    Amylyn Mortimer, PA-C  Thoracic Surgery

## 2024-12-16 NOTE — TELEPHONE ENCOUNTER
"Reason for Disposition   Fever > 100.4 F (38.0 C)    Answer Assessment - Initial Assessment Questions  1. SYMPTOM: \"What's the main symptom you're concerned about?\" (e.g., pain, fever, vomiting)       Fever of 100.8    2. ONSET: \"When did it   start?\"      Tonight     3. SURGERY: \"What surgery did you have?\"      Tuesday      4. DATE of SURGERY: \"When was the surgery?\"       Rib removed     6. DRAINS: \"Were any drains place in or around the wound?\" (e.g., Hemovac, Herminio-Reynolds, Penrose)      Drain was removed on Saturday morning     7. PAIN: \"Is there any pain?\" If Yes, ask: \"How bad is it?\"  (Scale 1-10; or mild, moderate, severe)      Yes     8. FEVER: \"Do you have a fever?\" If Yes, ask: \"What is your temperature, how was it measured, and when did it start?\"      100.8  Just took tylenol     11. OTHER SYMPTOMS: \"Do you have any other symptoms?\" (e.g., drainage from wound, painful urination, constipation)        Earache, headache, sore throat    Protocols used: Post-Op Symptoms and Questions-Adult-    "

## 2024-12-16 NOTE — TELEPHONE ENCOUNTER
[facility] with [facility] ([address], [phone]).  They are unable to fill as resident who prescribed does not have JACINTO# yet.  Advised that I would have either PA or Dr. Gibson send in script for patient.  Followed up with patient's mother Michelle.  Advised that we will be resending script for patient.  Patient's mother advised that patient is feeling better this morning and is afebrile. Patient reports slight sore throat but no headache or earache.  Patient reports constipation with Oxycodone.  Advised Michelle that patient can take stool softener/Mirilax to help alleviate symptoms.  Acknowledged understanding and was appreciative of return call.

## 2024-12-16 NOTE — TELEPHONE ENCOUNTER
Received a phone call from patient's mother, Michelle.  Michelle stated that she is returning Meera's voicemail message.  Reviewed note from Meera.  Michelle verbalized understanding.  Michelle also stated that pharmacy wasn't able to fill Gabapentin d/t prescriber was not authorized to prescribe this medication.  Please advise.  [facility] pharmacy # [phone].

## 2024-12-16 NOTE — TELEPHONE ENCOUNTER
LM for patient to discuss symptoms.  Dr. Gibson is aware.  Patient should follow up with PCP for ear ache, headache, fever and sore throat.

## 2024-12-16 NOTE — UTILIZATION REVIEW
NOTIFICATION OF ADMISSION DISCHARGE   This is a Notification of Discharge from [facility]. Please be advised that this patient has been discharge from our facility. Below you will find the admission and discharge date and time including the patient’s disposition.   UTILIZATION REVIEW CONTACT:  Fredis Foster  Utilization   Network Utilization Review Department  Phone: [phone] x carefully listen to the prompts. All voicemails are confidential.  Email: [email or web address]     ADMISSION INFORMATION  PRESENTATION DATE: 12/13/2024  5:38 AM  OBERVATION ADMISSION DATE: N/A  INPATIENT ADMISSION DATE: 12/13/24  1:05 PM   DISCHARGE DATE: 12/15/2024 10:43 AM   DISPOSITION:Home/Self Care    Network Utilization Review Department  ATTENTION: Please call with any questions or concerns to [phone] and carefully listen to the prompts so that you are directed to the right person. All voicemails are confidential.   For Discharge needs, contact Care Management DC Support Team at [phone] opt. 2  Send all requests for admission clinical reviews, approved or denied determinations and any other requests to dedicated fax number below belonging to the campus where the patient is receiving treatment. List of dedicated fax numbers for the Facilities:  FACILITY NAME UR FAX NUMBER   ADMISSION DENIALS (Administrative/Medical Necessity) [phone]   DISCHARGE SUPPORT TEAM ([facility]) [phone]   PARENT CHILD HEALTH (Maternity/NICU/Pediatrics) [phone]   [facility] [phone]   [facility] [phone]   [facility] [phone]   [facility] [phone]   [facility] [phone]   [facility] [phone]   [facility] [phone]   [facility] [phone]    [facility] [phone]   [facility] [phone]   [facility] [phone]   [facility] [phone]

## 2024-12-19 ENCOUNTER — OFFICE VISIT (OUTPATIENT)
Dept: FAMILY MEDICINE CLINIC | Facility: CLINIC | Age: 24
End: 2024-12-19
Payer: COMMERCIAL

## 2024-12-19 VITALS
TEMPERATURE: 98.3 F | OXYGEN SATURATION: 98 % | WEIGHT: 145 LBS | HEART RATE: 86 BPM | DIASTOLIC BLOOD PRESSURE: 80 MMHG | SYSTOLIC BLOOD PRESSURE: 120 MMHG | RESPIRATION RATE: 14 BRPM | BODY MASS INDEX: 24.16 KG/M2 | HEIGHT: 65 IN

## 2024-12-19 DIAGNOSIS — M48.07 SPINAL STENOSIS OF LUMBOSACRAL REGION: ICD-10-CM

## 2024-12-19 DIAGNOSIS — R17 ELEVATED BILIRUBIN: ICD-10-CM

## 2024-12-19 DIAGNOSIS — G54.0 THORACIC OUTLET SYNDROME: Primary | ICD-10-CM

## 2024-12-19 DIAGNOSIS — K58.1 IRRITABLE BOWEL SYNDROME WITH CONSTIPATION: ICD-10-CM

## 2024-12-19 DIAGNOSIS — F41.8 ANXIETY ASSOCIATED WITH DEPRESSION: ICD-10-CM

## 2024-12-19 DIAGNOSIS — M54.50 CHRONIC BILATERAL LOW BACK PAIN WITHOUT SCIATICA: ICD-10-CM

## 2024-12-19 DIAGNOSIS — F90.2 ATTENTION DEFICIT HYPERACTIVITY DISORDER (ADHD), COMBINED TYPE: ICD-10-CM

## 2024-12-19 DIAGNOSIS — G89.29 CHRONIC BILATERAL LOW BACK PAIN WITHOUT SCIATICA: ICD-10-CM

## 2024-12-19 PROBLEM — F90.9 ATTENTION DEFICIT HYPERACTIVITY DISORDER: Status: ACTIVE | Noted: 2024-12-19

## 2024-12-19 PROCEDURE — 88311 DECALCIFY TISSUE: CPT | Performed by: PATHOLOGY

## 2024-12-19 PROCEDURE — 99496 TRANSJ CARE MGMT HIGH F2F 7D: CPT | Performed by: STUDENT IN AN ORGANIZED HEALTH CARE EDUCATION/TRAINING PROGRAM

## 2024-12-19 PROCEDURE — 88304 TISSUE EXAM BY PATHOLOGIST: CPT | Performed by: PATHOLOGY

## 2024-12-19 NOTE — PROGRESS NOTES
Transition of Care Visit  Name: Jay Ortiz      : 2000      MRN: [id number]  Encounter Provider: Low Zaidi DO  Encounter Date: 2024   Encounter department: [facility]    Assessment & Plan  Thoracic outlet syndrome         Irritable bowel syndrome with constipation         Anxiety associated with depression         Attention deficit hyperactivity disorder (ADHD), combined type         Spinal stenosis of lumbosacral region         Elevated bilirubin         Chronic bilateral low back pain without sciatica           Patient is a pleasant 24-year-old male coming in for transition of care management after left first rib resection, excision first/cervical rib in the setting of thoracic outlet syndrome, symptoms continue to improve, patient is doing well postop 1 week.  Surgical incisions showed no signs of infection.  Breath sounds clear to auscultation bilaterally.  Patient has appropriate x-ray imaging follow-up and surgical evaluation on 2024.       History of Present Illness     Transitional Care Management Review:   Jay Ortiz is a 24 y.o. male here for TCM follow up.     During the TCM phone call patient stated:  TCM Call     Date and time call was made  2024 10:41 AM    Hospital care reviewed  Records reviewed    Patient was hospitialized at  [facility]    Date of Admission  24    Date of discharge  12/15/24    Diagnosis  Thoracic surgery    Disposition  Home    Were the patients medications reviewed and updated  Yes    Current Symptoms  Incisional pain; Cough      TCM Call     Post hospital issues  None    Should patient be enrolled in anticoag monitoring?  No    Scheduled for follow up?  Yes    Referrals needed  surgeon     Did you obtain your prescribed medications  Yes    Do you need help managing your prescriptions or medications  No    Is transportation to your appointment needed  No    I have advised the patient  "to call PCP with any new or worsening symptoms  Nasra Bob 12/16/24    Living Arrangements  parents    Are you recieving any outpatient services  No    Are you recieving home care services  No    Are you using any community resources  No    Current waiver services  No    Have you fallen in the last 12 months  No    Interperter language line needed  No        HPI  Review of Systems   Constitutional:  Negative for chills and fever.   HENT:  Negative for ear pain and sore throat.    Eyes:  Negative for pain and visual disturbance.   Respiratory:  Negative for cough and shortness of breath.    Cardiovascular:  Negative for chest pain and palpitations.   Gastrointestinal:  Negative for abdominal pain and vomiting.   Genitourinary:  Negative for dysuria and hematuria.   Musculoskeletal:  Negative for arthralgias and back pain.   Skin:  Negative for color change and rash.   Neurological:  Negative for seizures and syncope.   Psychiatric/Behavioral:  Negative for agitation, behavioral problems and confusion.    All other systems reviewed and are negative.    Objective   /80 (BP Location: Left arm, Patient Position: Sitting, Cuff Size: Standard)   Pulse 86   Temp 98.3 °F (36.8 °C) (Temporal)   Resp 14   Ht 5' 5\" (1.651 m)   Wt 65.8 kg (145 lb)   SpO2 98%   BMI 24.13 kg/m²     Physical Exam  Vitals and nursing note reviewed.   Constitutional:       General: He is not in acute distress.     Appearance: He is well-developed.   HENT:      Head: Normocephalic and atraumatic.   Eyes:      General: No scleral icterus.     Conjunctiva/sclera: Conjunctivae normal.   Cardiovascular:      Rate and Rhythm: Normal rate and regular rhythm.      Pulses: Normal pulses.      Heart sounds: No murmur heard.  Pulmonary:      Effort: Pulmonary effort is normal. No respiratory distress.      Breath sounds: Normal breath sounds.   Abdominal:      Palpations: Abdomen is soft.      Tenderness: There is no abdominal tenderness. "   Musculoskeletal:         General: No swelling. Normal range of motion.      Cervical back: Neck supple.   Skin:     General: Skin is warm and dry.      Capillary Refill: Capillary refill takes less than 2 seconds.   Neurological:      General: No focal deficit present.      Mental Status: He is alert and oriented to person, place, and time. Mental status is at baseline.   Psychiatric:         Mood and Affect: Mood normal.       Medications have been reviewed by provider in current encounter

## 2024-12-19 NOTE — ANESTHESIA POSTPROCEDURE EVALUATION
Post-Op Assessment Note    Last Filed PACU Vitals:  Vitals Value Taken Time   Temp 97.7 °F (36.5 °C) 12/13/24 1416   Pulse 93 12/13/24 1423   /51 12/13/24 1416   Resp 23 12/13/24 1422   SpO2 98 % 12/13/24 1423   Vitals shown include unfiled device data.    Modified Anastasiia:  No data recorded

## 2024-12-23 ENCOUNTER — TELEPHONE (OUTPATIENT)
Dept: CARDIAC SURGERY | Facility: CLINIC | Age: 24
End: 2024-12-23

## 2024-12-23 NOTE — TELEPHONE ENCOUNTER
Spoke with patient's mom Michelle to confirm receiving  disability forms. I informed her that it will take 5-7 days for it to be completed and faxed to employer. She verbalized her understanding on next steps.

## 2024-12-25 DIAGNOSIS — F41.8 ANXIETY ASSOCIATED WITH DEPRESSION: ICD-10-CM

## 2024-12-25 RX ORDER — TRAZODONE HYDROCHLORIDE 50 MG/1
50 TABLET, FILM COATED ORAL
Qty: 30 TABLET | Refills: 5 | Status: SHIPPED | OUTPATIENT
Start: 2024-12-25 | End: 2025-06-23

## 2024-12-26 ENCOUNTER — TELEPHONE (OUTPATIENT)
Dept: CARDIAC SURGERY | Facility: CLINIC | Age: 24
End: 2024-12-26

## 2024-12-26 NOTE — TELEPHONE ENCOUNTER
RICARDOM for pt in regards to his post op appointment on 12/30/24. I informed the pt that he will need to get a CXR prior to the appointment. I advised the pt to please call the office back if he should have any questions.

## 2024-12-27 ENCOUNTER — APPOINTMENT (OUTPATIENT)
Dept: RADIOLOGY | Facility: CLINIC | Age: 24
End: 2024-12-27
Payer: COMMERCIAL

## 2024-12-27 DIAGNOSIS — G54.0 THORACIC OUTLET SYNDROME: ICD-10-CM

## 2024-12-27 PROCEDURE — 71046 X-RAY EXAM CHEST 2 VIEWS: CPT

## 2024-12-30 ENCOUNTER — OFFICE VISIT (OUTPATIENT)
Dept: CARDIAC SURGERY | Facility: CLINIC | Age: 24
End: 2024-12-30

## 2024-12-30 VITALS
WEIGHT: 146.39 LBS | SYSTOLIC BLOOD PRESSURE: 118 MMHG | RESPIRATION RATE: 14 BRPM | OXYGEN SATURATION: 97 % | TEMPERATURE: 98.7 F | HEART RATE: 81 BPM | DIASTOLIC BLOOD PRESSURE: 76 MMHG | BODY MASS INDEX: 24.39 KG/M2 | HEIGHT: 65 IN

## 2024-12-30 DIAGNOSIS — G54.0 THORACIC OUTLET SYNDROME: Primary | ICD-10-CM

## 2024-12-30 PROCEDURE — 99024 POSTOP FOLLOW-UP VISIT: CPT | Performed by: SURGERY

## 2024-12-30 NOTE — ASSESSMENT & PLAN NOTE
24yM w/ bilateral TOS s/p Left robotic assisted first rib resection on 12/13/24.     Out-patient CXR reviewed with a small lateral opacity over the peripheral left mid lung field.     He is doing very well. Overall, many of his LUE complaints are resolved. He is now more aware of his right sided complaints. He is very happy with the results.     In 2 weeks he can return to work with a 20 lb weight restriction. I sent him with a work note today. He should return in 4 weeks for a check up. We will discuss right sided surgery at that time.

## 2024-12-30 NOTE — PROGRESS NOTES
Name: Jay Ortiz      : 2000      MRN: [id number]  Encounter Provider: Dc Gibson DO  Encounter Date: 2024   Encounter department: [facility]  :  Assessment & Plan  Thoracic outlet syndrome  24yM w/ bilateral TOS s/p Left robotic assisted first rib resection on 24.     Out-patient CXR reviewed with a small lateral opacity over the peripheral left mid lung field.     He is doing very well. Overall, many of his LUE complaints are resolved. He is now more aware of his right sided complaints. He is very happy with the results.     In 2 weeks he can return to work with a 20 lb weight restriction. I sent him with a work note today. He should return in 4 weeks for a check up. We will discuss right sided surgery at that time.              Thoracic History     Problem   Thoracic Outlet Syndrome        History of Present Illness   HPI  Jay Ortiz is a 24 y.o. male who presents for his first postoperative appointment after his robotic left first rib resection on 24.     He states that temperature sensation is better now on the left. He states overall sensation is better on the left. He feels less tightness in the left arm. No tingling in the arm anymore. Able to rotate his neck more towards the left. He is more aware of his right arm dysfunction now. He does have some paresthesia on the superior aspect of his left pec.     Review of Systems   Constitutional:  Negative for chills and fever.   HENT:  Negative for trouble swallowing and voice change.    Eyes:  Negative for photophobia and visual disturbance.   Respiratory:  Negative for chest tightness and shortness of breath.    Cardiovascular:  Negative for palpitations and leg swelling.   Gastrointestinal:  Negative for abdominal pain, nausea and vomiting.   Musculoskeletal:  Positive for back pain.        Right sided neck pain.    Skin:  Negative for rash and wound.   Neurological:  Negative  "for dizziness and light-headedness.        Per HPI   All other systems reviewed and are negative.          Objective   /76 (BP Location: Left arm, Patient Position: Sitting, Cuff Size: Standard)   Pulse 81   Temp 98.7 °F (37.1 °C) (Temporal)   Resp 14   Ht 5' 5\" (1.651 m)   Wt 66.4 kg (146 lb 6.2 oz)   SpO2 97%   BMI 24.36 kg/m²     ECOG    Physical Exam  Vitals reviewed.   Constitutional:       Appearance: Normal appearance. He is normal weight.   HENT:      Head: Normocephalic and atraumatic.   Neck:      Comments: Decreased rotation to the right.   Cardiovascular:      Rate and Rhythm: Normal rate and regular rhythm.      Heart sounds: Normal heart sounds. No murmur heard.  Pulmonary:      Effort: No respiratory distress.      Breath sounds: Normal breath sounds.   Abdominal:      General: Abdomen is flat. There is no distension.      Palpations: Abdomen is soft.   Musculoskeletal:      Right lower leg: No edema.      Left lower leg: No edema.   Lymphadenopathy:      Cervical: No cervical adenopathy.   Skin:     Comments: Well healed left chest incisions.    Neurological:      General: No focal deficit present.      Mental Status: He is alert and oriented to person, place, and time.   Psychiatric:         Mood and Affect: Mood normal.         Thought Content: Thought content normal.         Judgment: Judgment normal.         Labs:       Pathology: I have reviewed pathology reports described above.      "

## 2025-01-17 DIAGNOSIS — F41.8 ANXIETY ASSOCIATED WITH DEPRESSION: ICD-10-CM

## 2025-01-24 DIAGNOSIS — F41.8 ANXIETY ASSOCIATED WITH DEPRESSION: ICD-10-CM

## 2025-01-24 RX ORDER — TRAZODONE HYDROCHLORIDE 50 MG/1
50 TABLET, FILM COATED ORAL
Qty: 30 TABLET | Refills: 2 | Status: SHIPPED | OUTPATIENT
Start: 2025-01-24 | End: 2025-07-23

## 2025-01-27 ENCOUNTER — PREP FOR PROCEDURE (OUTPATIENT)
Dept: CARDIAC SURGERY | Facility: CLINIC | Age: 25
End: 2025-01-27

## 2025-01-27 ENCOUNTER — OFFICE VISIT (OUTPATIENT)
Dept: CARDIAC SURGERY | Facility: CLINIC | Age: 25
End: 2025-01-27
Payer: COMMERCIAL

## 2025-01-27 VITALS
DIASTOLIC BLOOD PRESSURE: 80 MMHG | HEART RATE: 78 BPM | HEIGHT: 65 IN | SYSTOLIC BLOOD PRESSURE: 122 MMHG | OXYGEN SATURATION: 98 % | WEIGHT: 150.79 LBS | TEMPERATURE: 97.5 F | BODY MASS INDEX: 25.12 KG/M2 | RESPIRATION RATE: 14 BRPM

## 2025-01-27 DIAGNOSIS — G54.0 THORACIC OUTLET SYNDROME: Primary | ICD-10-CM

## 2025-01-27 PROCEDURE — 99214 OFFICE O/P EST MOD 30 MIN: CPT | Performed by: SURGERY

## 2025-01-27 RX ORDER — GABAPENTIN 300 MG/1
600 CAPSULE ORAL ONCE
OUTPATIENT
Start: 2025-01-27 | End: 2025-01-27

## 2025-01-27 RX ORDER — CELECOXIB 200 MG/1
200 CAPSULE ORAL ONCE
OUTPATIENT
Start: 2025-01-27 | End: 2025-01-27

## 2025-01-27 RX ORDER — ACETAMINOPHEN 325 MG/1
975 TABLET ORAL ONCE
OUTPATIENT
Start: 2025-01-27 | End: 2025-01-27

## 2025-01-27 RX ORDER — HEPARIN SODIUM 5000 [USP'U]/ML
5000 INJECTION, SOLUTION INTRAVENOUS; SUBCUTANEOUS
OUTPATIENT
Start: 2025-01-28 | End: 2025-01-29

## 2025-01-27 RX ORDER — CEFAZOLIN SODIUM 2 G/50ML
2000 SOLUTION INTRAVENOUS ONCE
OUTPATIENT
Start: 2025-01-27 | End: 2025-01-27

## 2025-01-27 NOTE — ASSESSMENT & PLAN NOTE
24yM w/ bilateral TOS s/p L Robotic assisted first rib resection. He is recovering well from surgery.    Plan for Right Robotic first rib resection. Obtained consent in the office today. Discussed risks similar to his contralateral resection which include bleeding, infection, injury to surrounding structures including subclavian vessels and brachial plexus, as well as persistence of symptoms.     Orders:  •  Ambulatory Referral to Physical Therapy; Future  •  Case request operating room: RESECTION RIB,THORACOSCOPIC WITH ROBOTICS; Standing  •  Type and screen; Future

## 2025-01-27 NOTE — PROGRESS NOTES
Name: Jay Ortiz      : 2000      MRN: [id number]  Encounter Provider: Dc Gibson DO  Encounter Date: 2025   Encounter department: [facility]  :  Assessment & Plan  Thoracic outlet syndrome  24yM w/ bilateral TOS s/p L Robotic assisted first rib resection. He is recovering well from surgery.    Plan for Right Robotic first rib resection. Obtained consent in the office today. Discussed risks similar to his contralateral resection which include bleeding, infection, injury to surrounding structures including subclavian vessels and brachial plexus, as well as persistence of symptoms.     Orders:  •  Ambulatory Referral to Physical Therapy; Future  •  Case request operating room: RESECTION RIB,THORACOSCOPIC WITH ROBOTICS; Standing  •  Type and screen; Future        Thoracic History   Diagnosis: TOS  Procedures/Surgeries: L first rib resection  Pathology: No malignancy  Adjuvant Therapy: N/a  Problem   Thoracic Outlet Syndrome        History of Present Illness   HPI  Jay Ortiz is a 24 y.o. male who presents for follow-up. He has undergone a left robotic first rib resection with excellent results. We started with the left side given his symptoms were worse on the left.     Regarding the right symptoms: He states pain is worse in the morning and he has dizzines when laying on the right side. This is right shoulder and trapezius pain. Decreased sensation to touch and temperature on the right arm. Weakness on the right side. Worse complaint is dizziness with moving the right arm and pain in shoulder and trapezius with moving the right arm. States depending on temperature and movement develops paleness in the right arm and fingers.   Numbness quickly in the fingers with arm extension or abduction.       Review of Systems   Constitutional:  Negative for chills, fatigue and fever.   HENT:  Negative for trouble swallowing and voice change.    Eyes:   "Negative for photophobia and visual disturbance.   Respiratory:  Negative for cough and shortness of breath.    Cardiovascular:  Negative for chest pain and palpitations.   Gastrointestinal:  Negative for nausea and vomiting.   Musculoskeletal:  Positive for back pain. Negative for gait problem.   Skin:  Negative for rash and wound.   Neurological:  Positive for dizziness and weakness.        Per HPI   All other systems reviewed and are negative.          Objective   /80 (BP Location: Left arm, Patient Position: Sitting, Cuff Size: Standard)   Pulse 78   Temp 97.5 °F (36.4 °C) (Temporal)   Resp 14   Ht 5' 5\" (1.651 m)   Wt 68.4 kg (150 lb 12.7 oz)   SpO2 98%   BMI 25.09 kg/m²     Pain Screening:  Pain Score: 0-No pain  ECOG    Physical Exam  Vitals reviewed.   Constitutional:       Appearance: Normal appearance. He is normal weight.   HENT:      Head: Normocephalic and atraumatic.   Cardiovascular:      Rate and Rhythm: Normal rate and regular rhythm.      Pulses: Normal pulses.   Pulmonary:      Effort: Pulmonary effort is normal. No respiratory distress.      Breath sounds: Normal breath sounds.   Abdominal:      General: Abdomen is flat. There is no distension.   Musculoskeletal:         General: No swelling or deformity.      Cervical back: Normal range of motion. No rigidity.      Right lower leg: No edema.      Left lower leg: No edema.   Lymphadenopathy:      Cervical: No cervical adenopathy.   Skin:     General: Skin is warm and dry.      Capillary Refill: Capillary refill takes less than 2 seconds.      Findings: No erythema or rash.      Comments: Well healed left chest incisions.     Right arm with decreased sensation.    Neurological:      General: No focal deficit present.      Mental Status: He is alert and oriented to person, place, and time.   Psychiatric:         Mood and Affect: Mood normal.         Behavior: Behavior normal.         Thought Content: Thought content normal.         " Judgment: Judgment normal.         Labs:       Pathology: I have reviewed pathology reports described above.    Administrative Statements   I have spent a total time of 35 minutes in caring for this patient on the day of the visit/encounter including Diagnostic results, Prognosis, Instructions for management, Patient and family education, Importance of tx compliance, Counseling / Coordination of care, Documenting in the medical record, Reviewing / ordering tests, medicine, procedures  , Obtaining or reviewing history  , and Communicating with other healthcare professionals .

## 2025-02-02 ENCOUNTER — HOSPITAL ENCOUNTER (OUTPATIENT)
Dept: RADIOLOGY | Facility: HOSPITAL | Age: 25
Discharge: HOME | End: 2025-02-02
Payer: COMMERCIAL

## 2025-02-02 DIAGNOSIS — M54.50 CHRONIC BILATERAL LOW BACK PAIN WITHOUT SCIATICA: ICD-10-CM

## 2025-02-02 DIAGNOSIS — G89.29 CHRONIC BILATERAL LOW BACK PAIN WITHOUT SCIATICA: ICD-10-CM

## 2025-02-02 DIAGNOSIS — M48.07 SPINAL STENOSIS OF LUMBOSACRAL REGION: ICD-10-CM

## 2025-02-02 PROCEDURE — 72148 MRI LUMBAR SPINE W/O DYE: CPT

## 2025-02-11 ENCOUNTER — OFFICE VISIT (OUTPATIENT)
Dept: FAMILY MEDICINE CLINIC | Facility: CLINIC | Age: 25
End: 2025-02-11
Payer: COMMERCIAL

## 2025-02-11 ENCOUNTER — TELEPHONE (OUTPATIENT)
Age: 25
End: 2025-02-11

## 2025-02-11 VITALS
RESPIRATION RATE: 16 BRPM | TEMPERATURE: 98.2 F | BODY MASS INDEX: 26.49 KG/M2 | OXYGEN SATURATION: 98 % | DIASTOLIC BLOOD PRESSURE: 66 MMHG | SYSTOLIC BLOOD PRESSURE: 116 MMHG | HEART RATE: 87 BPM | WEIGHT: 159 LBS | HEIGHT: 65 IN

## 2025-02-11 DIAGNOSIS — K58.1 IRRITABLE BOWEL SYNDROME WITH CONSTIPATION: ICD-10-CM

## 2025-02-11 DIAGNOSIS — R10.32 LEFT GROIN PAIN: ICD-10-CM

## 2025-02-11 DIAGNOSIS — J10.1 INFLUENZA A: Primary | ICD-10-CM

## 2025-02-11 DIAGNOSIS — R50.9 FEVER, UNSPECIFIED FEVER CAUSE: ICD-10-CM

## 2025-02-11 DIAGNOSIS — G54.0 THORACIC OUTLET SYNDROME: ICD-10-CM

## 2025-02-11 LAB
SL AMB POCT RAPID FLU A: ABNORMAL
SL AMB POCT RAPID FLU B: NEGATIVE

## 2025-02-11 PROCEDURE — 99214 OFFICE O/P EST MOD 30 MIN: CPT | Performed by: STUDENT IN AN ORGANIZED HEALTH CARE EDUCATION/TRAINING PROGRAM

## 2025-02-11 PROCEDURE — 87804 INFLUENZA ASSAY W/OPTIC: CPT | Performed by: STUDENT IN AN ORGANIZED HEALTH CARE EDUCATION/TRAINING PROGRAM

## 2025-02-11 RX ORDER — OSELTAMIVIR PHOSPHATE 75 MG/1
75 CAPSULE ORAL EVERY 12 HOURS SCHEDULED
Qty: 10 CAPSULE | Refills: 0 | Status: SHIPPED | OUTPATIENT
Start: 2025-02-11 | End: 2025-02-16

## 2025-02-11 NOTE — PROGRESS NOTES
Name: Jay Ortiz      : 2000      MRN: [id number]  Encounter Provider: Low Zaidi DO  Encounter Date: 2025   Encounter department: [facility]  :  Assessment & Plan  Influenza A  Positive influenza A in office today, recommend Tamiflu symptomatic relief, appropriate hydration/nutrition, follow-up if symptoms worsen or not improving  Orders:  •  oseltamivir (TAMIFLU) 75 mg capsule; Take 1 capsule (75 mg total) by mouth every 12 (twelve) hours for 5 days    Fever, unspecified fever cause    Orders:  •  POCT rapid flu A and B    Irritable bowel syndrome with constipation  Continue to monitor triggers, symptomatic management       Thoracic outlet syndrome  Patient is following with cardiothoracic surgery, appreciate recommendations       Left groin pain  Left groin/testicular discomfort for over a week, no signs of torsion or red flag symptoms, if pain worsens recommend urgent ultrasound imaging.  Plan for ultrasound inguinal, scrotum, testicular pending.  Orders:  •  US groin/inguinal area; Future  •  US scrotum and testicles; Future          Depression Screening and Follow-up Plan: Patient was screened for depression during today's encounter. They screened negative with a PHQ-9 score of 0.        History of Present Illness   Follow-up acute care visit, flulike symptoms.      Review of Systems   Constitutional:  Positive for fatigue and fever. Negative for chills.   HENT:  Negative for ear pain and sore throat.    Eyes:  Negative for pain and visual disturbance.   Respiratory:  Positive for cough. Negative for shortness of breath and wheezing.    Cardiovascular:  Negative for chest pain and palpitations.   Gastrointestinal:  Negative for abdominal pain and vomiting.   Genitourinary:  Negative for dysuria and hematuria.   Musculoskeletal:  Negative for arthralgias and back pain.   Skin:  Negative for color change and rash.   Neurological:  Negative for seizures and  "syncope.   Psychiatric/Behavioral:  Negative for agitation, behavioral problems and confusion.    All other systems reviewed and are negative.      Objective   /66 (BP Location: Left arm, Patient Position: Sitting, Cuff Size: Large)   Pulse 87   Temp 98.2 °F (36.8 °C) (Temporal)   Resp 16   Ht 5' 5\" (1.651 m)   Wt 72.1 kg (159 lb)   SpO2 98%   BMI 26.46 kg/m²      Physical Exam  Vitals and nursing note reviewed.   Constitutional:       General: He is not in acute distress.     Appearance: He is well-developed.   HENT:      Head: Normocephalic and atraumatic.   Eyes:      Conjunctiva/sclera: Conjunctivae normal.   Cardiovascular:      Rate and Rhythm: Normal rate and regular rhythm.      Pulses: Normal pulses.      Heart sounds: No murmur heard.  Pulmonary:      Effort: Pulmonary effort is normal. No respiratory distress.      Breath sounds: Normal breath sounds.   Abdominal:      General: Bowel sounds are normal. There is no distension.      Palpations: Abdomen is soft.      Tenderness: There is no abdominal tenderness.   Genitourinary:     Testes: Normal.      Comments: No hernia appreciated on exam, left testicle slightly elevated comparatively to right  Musculoskeletal:         General: No swelling. Normal range of motion.      Cervical back: Neck supple.   Skin:     General: Skin is warm and dry.      Capillary Refill: Capillary refill takes less than 2 seconds.   Neurological:      General: No focal deficit present.      Mental Status: He is alert and oriented to person, place, and time. Mental status is at baseline.   Psychiatric:         Mood and Affect: Mood normal.         Behavior: Behavior normal.         "

## 2025-02-11 NOTE — TELEPHONE ENCOUNTER
Pt called in stating that for the past two days he has developed a fever of 102, headache, nasal congestion and sinus pain. Pt states that he has had issues in the past with feeling pins and needles in both legs and a lump in in groin. Pt states that PCP is aware of this issue. Pt was recently at a concert where he stood for a long time and feels as if this has aggravated his symptoms. Pt is scheduled for 2/13 but would like to know if PCP would advise anything in meantime.

## 2025-02-11 NOTE — TELEPHONE ENCOUNTER
CHF Week 2 Survey      Responses   [facility] patient discharged from?  [place]   Does the patient have one of the following disease processes/diagnoses(primary or secondary)?  CHF   Week 2 attempt successful?  Yes   Call start time  1031   Call end time  1052   Discharge diagnosis  COPD/chronic resp failure, CHF   Meds reviewed with patient/caregiver?  Yes   Is the patient having any side effects they believe may be caused by any medication additions or changes?  No   Does the patient have all medications ordered at discharge?  Yes   Is the patient taking all medications as directed (includes completed medication regime)?  Yes   Does the patient have a primary care provider?   Yes   Does the patient have an appointment with their PCP within 7 days of discharge?  Yes   Comments regarding PCP  ZOILA Johansen   Has the patient kept scheduled appointments due by today?  N/A   Comments  Kidney MD on Tuesday, PCP the next week.   What is the Home health agency?   [facility]   Has home health visited the patient within 72 hours of discharge?  Yes   Has all DME been delivered?  Yes   DME comments  Wears O2 @2l/min. Has Trilogy   Pulse Ox monitoring  Intermittent   Pulse Ox device source  Patient   O2 Sat comments  Not taken yet today.   O2 Sat: education provided  Sat levels, Monitoring frequency, When to seek care   O2 Sat education comments  If 90% and stays there, call 911.   Psychosocial issues?  No   Did the patient receive a copy of their discharge instructions?  Yes   Nursing interventions  Reviewed instructions with patient   What is the patient's perception of their health status since discharge?  Improving   Nursing interventions  Nurse provided patient education   Is the patient weighing daily?  Yes   Does the patient have scales?  Yes   Daily weight interventions  Education provided on importance of daily weight   Is the patient able to teach back Heart Failure diet management?  Yes   Is the patient  Left message to return call.   able to teach back Heart Failure Zones?  Yes   Is the patient able to teach back signs and symptoms of worsening condition? (i.e. weight gain, shortness of air, etc.)  Yes   If the patient is a current smoker, are they able to teach back resources for cessation?  Not a smoker   Is the patient/caregiver able to teach back the hierarchy of who to call/visit for symptoms/problems? PCP, Specialist, Home health nurse, Urgent Care, ED, 911  Yes   Additional teach back comments  Last weighed at rehab, not since coming home. Avoids salt, and processed food.   CHF Week 2 call completed?  Yes   Wrap up additional comments  She says her granddtr will bathe her today and would like her legs wrapped after. Encouraged daily weight and monitoring pulse ox. [Efrain, HHRN on call for [facility], has trained the granddtr in the past how to wrap the legs. Pt says gdtr will bathe her today and HHRN will discuss wrapping with grddtr.  HHRN will go wrap if grdtr will/can not.]          Pita Levine RN

## 2025-02-19 ENCOUNTER — HOSPITAL ENCOUNTER (OUTPATIENT)
Dept: RADIOLOGY | Facility: HOSPITAL | Age: 25
Discharge: HOME | End: 2025-02-19
Payer: COMMERCIAL

## 2025-02-19 DIAGNOSIS — R10.32 LEFT GROIN PAIN: ICD-10-CM

## 2025-02-19 PROCEDURE — 76870 US EXAM SCROTUM: CPT

## 2025-02-20 ENCOUNTER — EVALUATION (OUTPATIENT)
Dept: PHYSICAL THERAPY | Facility: CLINIC | Age: 25
End: 2025-02-20

## 2025-02-20 DIAGNOSIS — Z48.89 AFTERCARE FOLLOWING SURGERY: ICD-10-CM

## 2025-02-20 DIAGNOSIS — R29.898 BILATERAL ARM WEAKNESS: ICD-10-CM

## 2025-02-20 DIAGNOSIS — G54.0 THORACIC OUTLET SYNDROME: Primary | ICD-10-CM

## 2025-02-20 PROCEDURE — 97161 PT EVAL LOW COMPLEX 20 MIN: CPT

## 2025-02-20 NOTE — PROGRESS NOTES
PT Evaluation     Today's date: 2025  Patient name: Jay Ortiz  : 2000  MRN: [id number]  Referring provider: Dc Gibson*  Dx:   Encounter Diagnosis     ICD-10-CM    1. Thoracic outlet syndrome  G54.0       2. Aftercare following surgery  Z48.89       3. Bilateral arm weakness  R29.898               Start Time: 0930  Stop Time: 1015  Total time in clinic (min): 45 minutes    Assessment  Impairments: abnormal or restricted ROM, activity intolerance, impaired physical strength, lacks appropriate home exercise program, pain with function, poor posture , poor body mechanics, unable to perform ADL, sensory processing, participation limitations, activity limitations and endurance  Symptom irritability: low    Assessment details: Jay Ortiz is a 24 y.o. male presenting s/p L first rib resection, performed on 24. He is scheduled for R first rib resection on 2025 given improvements post-operatively for LUE. Pt is currently presenting with improvement in temperature and light touch LUE sensation, improved strength, reduced numbness/tingling, and SOB at rest. While he presents with improvements, he continues with relative strength deficits, activity limitations, cervical ROM deficits, increased soft tissue tension, impaired thoracic mobility, SOB with exertion, intermittent lightheadedness/dizziness, and postural dysfunction. Due to these impairments, pt is experiencing functional limitations including ADLs/IADLs, work-related activity, recreational activities, sleeping, dressing, lifting, and turning his head. Pt clinical presentation is consistent with referring diagnosis and post-operative status. Initiated home exercise program and educated on plan of care. Jay Ortiz would benefit from skilled physical therapy services to address aforementioned impairments, reduce pain, promote return to prior level of function without limitation, and improve overall quality of  life.     Understanding of Dx/Px/POC: good     Prognosis: good    Goals  STG 2-4 weeks  1. Patient will be independent with HEP.   2. Patient will demonstrate good seated and standing posture without cueing from PT.   3. Patient will demonstrate improvement in cervical ROM by at least 10% in deficient planes  4. Patient will demonstrate improvement of UE strength by 1/2 MMT grade    LTG 6-8 weeks  1. Patient will be able to return to regular physical and recreational activity without limitation  2. Patient will be able to return to regular work activity without limitation  3. Patient will be able to lift at least 10lbs overhead   4. Patient will be demonstrate UE strength to at least 4+/5   5. Patient will be able to perform all ADLs/IADLs without pain or limitation         Plan  Patient would benefit from: skilled physical therapy and PT eval  Planned modality interventions: cryotherapy, TENS and thermotherapy: hydrocollator packs    Planned therapy interventions: abdominal trunk stabilization, IASTM, joint mobilization, manual therapy, activity modification, body mechanics training, flexibility, home exercise program, therapeutic activities, therapeutic exercise, strengthening, stretching, postural training, patient/caregiver education, neuromuscular re-education, self care, nerve gliding and kinesiology taping    Frequency: 1x week  Duration in weeks: 8  Plan of Care beginning date: 2/20/2025  Plan of Care expiration date: 4/17/2025  Treatment plan discussed with: patient  Plan details: HEP development, stretching, strengthening, A/AA/PROM, joint mobilizations, posture education, STM/MI as needed to reduce muscle tension, muscle reeducation, PLOC discussed and agreed upon with patient.        Subjective Evaluation    History of Present Illness  Date of surgery: 12/13/2024 (L first rib resection)  Mechanism of injury: Jay Ortiz presents s/p L first rib resection, performed on 12/13/24. Since his surgery,  he has noticed significant improvement in his temperature sensation and overall numbness/tingling. He does experience intermittent UE tingling with certain arm movements and activities, such as when he puts on a jacket, and notes feeling weakness through his arm and hand. He reports he has been slowly returning to work, but hasn't been able to lift anything heavy and is limited in picking up objects. He reports minimal discomfort in his arm, however has been experiencing some discomfort around his incisions and through his lower thoracic region bilaterally. Additionally, he notes improvement in brain fog and headaches, and is experiencing reduced SOB at rest but does notice some difficulty breathing with exertion. He also reports continued BLE numbness and weakness, however is being monitored by his MD and is undergoing various assessments to determine cause. Of note, he is scheduled to undergo R first rib resection on  given his success with the L.   Patient Goals  Patient goals for therapy: decreased pain, increased strength, independence with ADLs/IADLs and return to sport/leisure activities    Pain  Current pain ratin  At best pain ratin  At worst pain ratin  Quality: radiating, sharp, cramping and needle-like (numbness, weakness)  Relieving factors: change in position  Aggravating factors: overhead activity (laying flat and on left side, physical activity, stress)    Social Support    Employment status: working ([employer])      Objective     Concurrent Complaints  Positive for dizziness (lightheadedness - intermittent) and shortness of breath (with activity). Negative for headaches    Additional Special Questions  Notes improvement in SOB at rest and supine positions     Static Posture     Head  Forward.    Shoulders  Rounded.    Scapulae  Left protracted and right protracted.    Postural Observations  Seated posture: fair  Standing posture: fair  Correction of posture: has no consistent  effect      Palpation   Left   No palpable tenderness to the upper trapezius.   Hypertonic in the pectoralis major, pectoralis minor and upper trapezius.   Tenderness of the pectoralis major and pectoralis minor.     Right   Hypertonic in the pectoralis minor and upper trapezius.   Tenderness of the pectoralis major, pectoralis minor and upper trapezius.     Neurological Testing     Sensation   Cervical/Thoracic   Left   Intact: hot/cold discrimination  Diminished: light touch    Right   Diminished: light touch and hot/cold discrimination    Comments   Left hot cold discrimination: patient reported.   Right hot cold discrimination: patient reported.     Reflexes   Left   Biceps (C5/C6): normal (2+)  Brachioradialis (C6): normal (2+)  Triceps (C7): normal (2+)    Right   Biceps (C5/C6): normal (2+)  Brachioradialis (C6): normal (2+)  Triceps (C7): normal (2+)    Active Range of Motion   Cervical/Thoracic Spine       Cervical    Flexion: 25 (slight dizziness, tightness) degrees   Extension: 40 degrees      Left lateral flexion: 45 degrees      Right lateral flexion: 25 degrees      Left rotation: 70 degrees  Right rotation: 60 degrees       Thoracic    Flexion:  Restriction level: minimal  Extension:  Restriction level: moderate  Left rotation:  Restriction level: moderate  Right rotation:  Restriction level: moderate    Additional Active Range of Motion Details  Decreased lower cervical and upper thoracic ROM    Joint Play     Hypomobile: C5, C6, C7, T1, T2, T3, T4, T5, T6, T7, T8, T9, T10, T11 and T12   Mechanical Assessment    Cervical    Seated Protrusion: repeated movements   Pain location: peripheralized  Pain intensity: worse  Seated retraction: repeated movements   Pain location: centralized  Pain intensity: better    Thoracic      Lumbar      Strength/Myotome Testing     Additional Strength Details  LUE : 55, 50, 45  RUE : 60. 55, 50   See table below for further strength assessments     Tests      Left Shoulder   Negative Adson maneuver and cervical rotation lateral flexion.     Right Shoulder   Positive Adson maneuver and cervical rotation lateral flexion.   Neuro Exam:     Headaches   Patient reports headaches: No.         MMT  MMT 8/13 8/13 9/27 2/20/2025 (post op)  2/20/2025    Left Right Left Left Right   Shoulder flex 4 4+ 4 4+ 4+   Shoulder abd  4 4+ 4 4+ 4+   Elbow flex 4 4+ 4+ 4 4+   Elbow ext 3+ 4+ 3+ 4 4+   Wrist flex 3+ 4+ 3+ 4 4   Wrist ext 3+ 4+ 3+ 4 4   Thumb abd  4 4+ 4 4 4              Precautions: frequent SOB and lightheadedness with laying flat          Insurance:  AMA/CMS Eval/ Re-eval Auth #/ Referral # Total units or visits Start date  Expiration date KX? Visit limitation?  PT only or  PT+OT? Co-Insurance   CMS 7/29/24 Not required        30% coinsurance     9/27           SELF PAY 2/11/2025 (post op)         1NMR     POC Start Date POC Expiration Date Signed POC?   7/29/2024 9/23/2024 pending   9/27/2024 11/22/2024 2/20/2025 4/17/2025 Pending      2025 Date 2/20              Visits/Units:  Used 1              Authed:  Remaining                    Access Code: [email or web address]  URL: [email or web address]  Date: 02/20/2025  Prepared by: Katarzyna Tristan    Exercises  - Sidelying Thoracic Rotation with Open Book  - 1 x daily - 7 x weekly - 2 sets - 10 reps  - Standing Bicep Curls Supinated with Dumbbells  - 1 x daily - 7 x weekly - 2-3 sets - 10 reps  - Standing Tricep Extensions with Resistance  - 1 x daily - 7 x weekly - 2-3 sets - 10 reps  - Seated Wrist Flexion with Dumbbell  - 1 x daily - 7 x weekly - 2-3 sets - 10 reps  - Seated Wrist Extension with Dumbbell  - 1 x daily - 7 x weekly - 2-3 sets - 10 reps  - Standing Shoulder Flexion to 90 Degrees with Dumbbells  - 1 x daily - 7 x weekly - 2-3 sets - 10 reps    Date     2/20   Visit number      1 - IE    Manuals        STM                                Neuro Re-Ed        Scap retraction         Nerve glides         Horizontal abd                                        Ther Ex        HEP     Initiated    Education & management      POC, HEP   Cervical retraction        Thoracic extension        W's on wall                                 Ther Activity                        Gait Training                        Modalities

## 2025-02-22 DIAGNOSIS — F41.8 ANXIETY ASSOCIATED WITH DEPRESSION: ICD-10-CM

## 2025-02-24 RX ORDER — TRAZODONE HYDROCHLORIDE 50 MG/1
50 TABLET ORAL
Qty: 30 TABLET | Refills: 5 | Status: SHIPPED | OUTPATIENT
Start: 2025-02-24 | End: 2025-08-23

## 2025-02-25 ENCOUNTER — RESULTS FOLLOW-UP (OUTPATIENT)
Dept: FAMILY MEDICINE CLINIC | Facility: CLINIC | Age: 25
End: 2025-02-25

## 2025-02-27 ENCOUNTER — APPOINTMENT (OUTPATIENT)
Dept: PHYSICAL THERAPY | Facility: CLINIC | Age: 25
End: 2025-02-27

## 2025-03-03 ENCOUNTER — OFFICE VISIT (OUTPATIENT)
Dept: UROLOGY | Facility: CLINIC | Age: 25
End: 2025-03-03
Payer: COMMERCIAL

## 2025-03-03 VITALS
HEIGHT: 65 IN | BODY MASS INDEX: 26.99 KG/M2 | SYSTOLIC BLOOD PRESSURE: 118 MMHG | OXYGEN SATURATION: 100 % | HEART RATE: 76 BPM | DIASTOLIC BLOOD PRESSURE: 70 MMHG | WEIGHT: 162 LBS

## 2025-03-03 DIAGNOSIS — N42.9 PROSTATE DISORDER: ICD-10-CM

## 2025-03-03 DIAGNOSIS — R35.0 URINARY FREQUENCY: ICD-10-CM

## 2025-03-03 PROCEDURE — 99203 OFFICE O/P NEW LOW 30 MIN: CPT | Performed by: UROLOGY

## 2025-03-03 NOTE — PROGRESS NOTES
Jay Ortiz is a(n) 24 y.o. male. , :  2000    Subjective     Assessment:  Diagnoses of Urinary frequency and Prostate disorder were pertinent to this visit.  24-year-old man here for complaints of groin pain.  Underwent ultrasound of the scrotum.  Was read as normal.  Patient with recent influenza.  No significant cough.  Does have issues with constipation or at least he did.  He had colonoscopy which was normal per him.  He definitely had struggles to empty and may have set himself up with some pelvic floor dysfunction.  Has a girlfriend.  Tries to have regular intercourse but has discomfort in the penis and testicles.  Has vague complaints of decreased sensation with voiding.  No spraying or splitting.  No trauma to the urethra.  No sexually transmitted disease which might have given him stricture disease.  Not eager to have cystoscopy at this point since he voids with a reasonable stream.    Plan: Consider pelvic floor physical therapy.  Consider cystoscopy.  Short of that should try to relax with voiding and bowel movements.  Should try to ejaculate regularly.     Radiology  2025 scrotal ultrasound  Normal exam    2025 MRI spine  Normal    Labs   2024 BMP normal. low hematocrit elevated white count    Past Medical History  ADHD  Low back pain with spinal stenosis with normal MRI 2025  Irritable bowel with constipation  Thoracic outlet syndrome    Past  History  Groin pain, left side 2025    Past  surgical history   None    Prior Visits  None    3/3/2025 OV Moustapha Card  24-year-old man here for complaints of groin pain.  Underwent ultrasound of the scrotum.  Was read as normal.  Patient with recent influenza.  No significant cough.  Does have issues with constipation or at least he did.  He had colonoscopy which was normal per him.  He definitely had struggles to empty and may have set himself up with some pelvic floor dysfunction.  Has a girlfriend.   "Tries to have regular intercourse but has discomfort in the penis and testicles.  Has vague complaints of decreased sensation with voiding.  No spraying or splitting.  No trauma to the urethra.  No sexually transmitted disease which might have given him stricture disease.  Not eager to have cystoscopy at this point since he voids with a reasonable stream.    Plan: Consider pelvic floor physical therapy.  Consider cystoscopy.  Short of that should try to relax with voiding and bowel movements.  Should try to ejaculate regularly.    Review of Systems   Constitutional:  Negative for chills, diaphoresis and fever.   Respiratory:  Negative for cough and chest tightness.    Gastrointestinal:  Positive for constipation. Negative for diarrhea, nausea and rectal pain.   Genitourinary:  Positive for frequency, penile pain, testicular pain and urgency. Negative for decreased urine volume, flank pain, genital sores, hematuria, penile discharge, penile swelling and scrotal swelling.   Musculoskeletal:  Negative for back pain.   Skin:  Negative for rash.   Neurological:  Positive for numbness.   Psychiatric/Behavioral:  Negative for confusion. The patient is nervous/anxious.        No results found for: \"PSA\"  No results found for: \"TESTOSTERONE\"  No components found for: \"CR\"  No results found for: \"HBA1C\"    Objective     /70 (BP Location: Left arm, Patient Position: Sitting, Cuff Size: Adult)   Pulse 76   Ht 5' 5\" (1.651 m)   Wt 73.5 kg (162 lb)   SpO2 100%   BMI 26.96 kg/m²     Physical Exam  Constitutional:       Appearance: Normal appearance.   HENT:      Head: Normocephalic.   Genitourinary:     Penis: Normal.       Testes: Normal.      Comments: Digital rectal exam deferred.  Left testis higher than the right.  Normal size and shape.  No redness to the scrotum.  No obvious hernial bulge.  Skin:     General: Skin is warm.   Neurological:      General: No focal deficit present.      Mental Status: He is alert. "   Psychiatric:         Mood and Affect: Mood normal.         Thought Content: Thought content normal.           Moustapha Stephie, [facility]

## 2025-03-03 NOTE — PATIENT INSTRUCTIONS
Consider going for pelvic floor relaxation training or reviewing this on the Internet and seeing if it is something that you can do on your own.  Try to ejaculate regularly.  Try not to strain too hard to have bowel movements or void (urinate).

## 2025-03-25 ENCOUNTER — ANESTHESIA EVENT (OUTPATIENT)
Dept: PERIOP | Facility: HOSPITAL | Age: 25
DRG: 030 | End: 2025-03-25
Payer: COMMERCIAL

## 2025-03-25 NOTE — PRE-PROCEDURE INSTRUCTIONS
Pre-Surgery Instructions:   Medication Instructions    sertraline (ZOLOFT) 50 mg tablet Take day of surgery.    traZODone (DESYREL) 50 mg tablet Take night before surgery      Medication instructions for day of surgery reviewed. Please take all instructed medications with only a sip of water.       You will receive a call one business day prior to surgery with an arrival time and hospital directions. If your surgery is scheduled on a Monday, the hospital will be calling you on the Friday prior to your surgery. If you have not heard from anyone by 8pm, please call the hospital supervisor through the hospital  at [phone]. ([place] [phone] or [place] [phone]).    Do not eat or drink anything after midnight the night before your surgery, including candy, mints, lifesavers, or chewing gum. Do not drink alcohol 24hrs before your surgery. Try not to smoke at least 24hrs before your surgery.       Follow the pre surgery showering instructions as listed in the “My Surgical Experience Booklet” or otherwise provided by your surgeon's office. Do not use a blade to shave the surgical area 1 week before surgery. It is okay to use a clean electric clippers up to 24 hours before surgery. Do not apply any lotions, creams, including makeup, cologne, deodorant, or perfumes after showering on the day of your surgery. Do not use dry shampoo, hair spray, hair gel, or any type of hair products.     No contact lenses, eye make-up, or artificial eyelashes. Remove nail polish, including gel polish, and any artificial, gel, or acrylic nails if possible. Remove all jewelry including rings and body piercing jewelry.     Wear causal clothing that is easy to take on and off. Consider your type of surgery.    Keep any valuables, jewelry, piercings at home. Please bring any specially ordered equipment (sling, braces) if indicated.    Arrange for a responsible person to drive you to and from the hospital on the day of your  surgery. Please confirm the visitor policy for the day of your procedure when you receive your phone call with an arrival time.     Call the surgeon's office with any new illnesses, exposures, or additional questions prior to surgery.    Please reference your “My Surgical Experience Booklet” for additional information to prepare for your upcoming surgery.

## 2025-03-26 DIAGNOSIS — F41.8 ANXIETY ASSOCIATED WITH DEPRESSION: ICD-10-CM

## 2025-03-27 ENCOUNTER — OFFICE VISIT (OUTPATIENT)
Dept: FAMILY MEDICINE CLINIC | Facility: CLINIC | Age: 25
End: 2025-03-27
Payer: COMMERCIAL

## 2025-03-27 VITALS
TEMPERATURE: 97.1 F | BODY MASS INDEX: 26.33 KG/M2 | HEIGHT: 65 IN | WEIGHT: 158 LBS | SYSTOLIC BLOOD PRESSURE: 110 MMHG | OXYGEN SATURATION: 99 % | DIASTOLIC BLOOD PRESSURE: 70 MMHG | HEART RATE: 79 BPM | RESPIRATION RATE: 12 BRPM

## 2025-03-27 DIAGNOSIS — G54.0 THORACIC OUTLET SYNDROME: Primary | ICD-10-CM

## 2025-03-27 DIAGNOSIS — G89.29 CHRONIC BILATERAL LOW BACK PAIN WITHOUT SCIATICA: ICD-10-CM

## 2025-03-27 DIAGNOSIS — M54.50 CHRONIC BILATERAL LOW BACK PAIN WITHOUT SCIATICA: ICD-10-CM

## 2025-03-27 DIAGNOSIS — F41.8 ANXIETY ASSOCIATED WITH DEPRESSION: ICD-10-CM

## 2025-03-27 DIAGNOSIS — F90.2 ATTENTION DEFICIT HYPERACTIVITY DISORDER (ADHD), COMBINED TYPE: ICD-10-CM

## 2025-03-27 DIAGNOSIS — K58.1 IRRITABLE BOWEL SYNDROME WITH CONSTIPATION: ICD-10-CM

## 2025-03-27 DIAGNOSIS — M48.07 SPINAL STENOSIS OF LUMBOSACRAL REGION: ICD-10-CM

## 2025-03-27 PROCEDURE — 99214 OFFICE O/P EST MOD 30 MIN: CPT | Performed by: STUDENT IN AN ORGANIZED HEALTH CARE EDUCATION/TRAINING PROGRAM

## 2025-03-27 RX ORDER — TRAZODONE HYDROCHLORIDE 50 MG/1
50 TABLET ORAL
Qty: 30 TABLET | Refills: 5 | Status: SHIPPED | OUTPATIENT
Start: 2025-03-27 | End: 2025-03-27 | Stop reason: SDUPTHER

## 2025-03-27 RX ORDER — TRAZODONE HYDROCHLORIDE 100 MG/1
100 TABLET ORAL
Qty: 90 TABLET | Refills: 0 | Status: SHIPPED | OUTPATIENT
Start: 2025-03-27 | End: 2025-06-25

## 2025-03-27 NOTE — ASSESSMENT & PLAN NOTE
Patient will be going for cardiothoracic surgery on 4/4/2025, right side, previously had left side completed for thoracic outlet syndrome

## 2025-03-27 NOTE — H&P (VIEW-ONLY)
"Name: Jay Ortiz      : 2000      MRN: [id number]  Encounter Provider: Low Zaidi DO  Encounter Date: 3/27/2025   Encounter department: [facility]  :  Assessment & Plan  Thoracic outlet syndrome  Patient will be going for cardiothoracic surgery on 2025, right side, previously had left side completed for thoracic outlet syndrome       Spinal stenosis of lumbosacral region  Continue appropriate physical therapy recommendations at home       Irritable bowel syndrome with constipation  Patient will be following up with gastroenterology Future       Anxiety associated with depression  Continue Zoloft, increase trazodone  Orders:  •  traZODone (DESYREL) 100 mg tablet; Take 1 tablet (100 mg total) by mouth daily at bedtime    Attention deficit hyperactivity disorder (ADHD), combined type         Chronic bilateral low back pain without sciatica  Continue appropriate physical therapy at home exercises              History of Present Illness   Follow up chronic disease management.       Review of Systems   Constitutional:  Negative for chills and fever.   HENT:  Negative for ear pain and sore throat.    Eyes:  Negative for pain and visual disturbance.   Respiratory:  Negative for cough and shortness of breath.    Cardiovascular:  Negative for chest pain and palpitations.   Gastrointestinal:  Negative for abdominal pain, constipation, diarrhea, nausea and vomiting.   Genitourinary:  Negative for dysuria and hematuria.   Musculoskeletal:  Negative for arthralgias and back pain.   Skin:  Negative for color change and rash.   Neurological:  Negative for seizures and syncope.   Psychiatric/Behavioral:  Negative for agitation, behavioral problems and confusion.    All other systems reviewed and are negative.      Objective   /70 (BP Location: Left arm, Patient Position: Sitting, Cuff Size: Standard)   Pulse 79   Temp (!) 97.1 °F (36.2 °C) (Temporal)   Resp 12   Ht 5' 5\" " (1.651 m)   Wt 71.7 kg (158 lb)   SpO2 99%   BMI 26.29 kg/m²      Physical Exam  Vitals and nursing note reviewed.   Constitutional:       General: He is not in acute distress.     Appearance: He is well-developed.   HENT:      Head: Normocephalic and atraumatic.   Eyes:      General: No scleral icterus.     Conjunctiva/sclera: Conjunctivae normal.   Cardiovascular:      Rate and Rhythm: Normal rate and regular rhythm.      Pulses: Normal pulses.      Heart sounds: No murmur heard.  Pulmonary:      Effort: Pulmonary effort is normal. No respiratory distress.      Breath sounds: Normal breath sounds.   Abdominal:      General: Bowel sounds are normal. There is no distension.      Palpations: Abdomen is soft.      Tenderness: There is no abdominal tenderness.   Musculoskeletal:         General: No swelling. Normal range of motion.      Cervical back: Neck supple.   Skin:     General: Skin is warm and dry.      Capillary Refill: Capillary refill takes less than 2 seconds.      Coloration: Skin is not jaundiced.   Neurological:      General: No focal deficit present.      Mental Status: He is alert and oriented to person, place, and time. Mental status is at baseline.   Psychiatric:         Mood and Affect: Mood normal.         Behavior: Behavior normal.

## 2025-03-27 NOTE — PROGRESS NOTES
"Name: Jay Ortiz      : 2000      MRN: [id number]  Encounter Provider: Low Zaiid DO  Encounter Date: 3/27/2025   Encounter department: [facility]  :  Assessment & Plan  Thoracic outlet syndrome  Patient will be going for cardiothoracic surgery on 2025, right side, previously had left side completed for thoracic outlet syndrome       Spinal stenosis of lumbosacral region  Continue appropriate physical therapy recommendations at home       Irritable bowel syndrome with constipation  Patient will be following up with gastroenterology Future       Anxiety associated with depression  Continue Zoloft, increase trazodone  Orders:  •  traZODone (DESYREL) 100 mg tablet; Take 1 tablet (100 mg total) by mouth daily at bedtime    Attention deficit hyperactivity disorder (ADHD), combined type         Chronic bilateral low back pain without sciatica  Continue appropriate physical therapy at home exercises              History of Present Illness   Follow up chronic disease management.       Review of Systems   Constitutional:  Negative for chills and fever.   HENT:  Negative for ear pain and sore throat.    Eyes:  Negative for pain and visual disturbance.   Respiratory:  Negative for cough and shortness of breath.    Cardiovascular:  Negative for chest pain and palpitations.   Gastrointestinal:  Negative for abdominal pain, constipation, diarrhea, nausea and vomiting.   Genitourinary:  Negative for dysuria and hematuria.   Musculoskeletal:  Negative for arthralgias and back pain.   Skin:  Negative for color change and rash.   Neurological:  Negative for seizures and syncope.   Psychiatric/Behavioral:  Negative for agitation, behavioral problems and confusion.    All other systems reviewed and are negative.      Objective   /70 (BP Location: Left arm, Patient Position: Sitting, Cuff Size: Standard)   Pulse 79   Temp (!) 97.1 °F (36.2 °C) (Temporal)   Resp 12   Ht 5' 5\" " (1.651 m)   Wt 71.7 kg (158 lb)   SpO2 99%   BMI 26.29 kg/m²      Physical Exam  Vitals and nursing note reviewed.   Constitutional:       General: He is not in acute distress.     Appearance: He is well-developed.   HENT:      Head: Normocephalic and atraumatic.   Eyes:      General: No scleral icterus.     Conjunctiva/sclera: Conjunctivae normal.   Cardiovascular:      Rate and Rhythm: Normal rate and regular rhythm.      Pulses: Normal pulses.      Heart sounds: No murmur heard.  Pulmonary:      Effort: Pulmonary effort is normal. No respiratory distress.      Breath sounds: Normal breath sounds.   Abdominal:      General: Bowel sounds are normal. There is no distension.      Palpations: Abdomen is soft.      Tenderness: There is no abdominal tenderness.   Musculoskeletal:         General: No swelling. Normal range of motion.      Cervical back: Neck supple.   Skin:     General: Skin is warm and dry.      Capillary Refill: Capillary refill takes less than 2 seconds.      Coloration: Skin is not jaundiced.   Neurological:      General: No focal deficit present.      Mental Status: He is alert and oriented to person, place, and time. Mental status is at baseline.   Psychiatric:         Mood and Affect: Mood normal.         Behavior: Behavior normal.

## 2025-03-27 NOTE — ASSESSMENT & PLAN NOTE
Continue Zoloft, increase trazodone  Orders:  •  traZODone (DESYREL) 100 mg tablet; Take 1 tablet (100 mg total) by mouth daily at bedtime

## 2025-03-28 ENCOUNTER — LAB REQUISITION (OUTPATIENT)
Dept: LAB | Facility: HOSPITAL | Age: 25
End: 2025-03-28
Payer: COMMERCIAL

## 2025-03-28 ENCOUNTER — APPOINTMENT (OUTPATIENT)
Dept: LAB | Facility: CLINIC | Age: 25
End: 2025-03-28
Payer: COMMERCIAL

## 2025-03-28 DIAGNOSIS — G54.0 THORACIC OUTLET SYNDROME: ICD-10-CM

## 2025-03-28 DIAGNOSIS — G54.0 BRACHIAL PLEXUS DISORDERS: ICD-10-CM

## 2025-03-28 PROCEDURE — 86850 RBC ANTIBODY SCREEN: CPT | Performed by: SURGERY

## 2025-03-28 PROCEDURE — 86900 BLOOD TYPING SEROLOGIC ABO: CPT | Performed by: SURGERY

## 2025-03-28 PROCEDURE — 36415 COLL VENOUS BLD VENIPUNCTURE: CPT

## 2025-03-28 PROCEDURE — 86901 BLOOD TYPING SEROLOGIC RH(D): CPT | Performed by: SURGERY

## 2025-04-03 NOTE — ANESTHESIA PREPROCEDURE EVALUATION
Procedure:  RESECTION RIB,THORACOSCOPIC WITH ROBOTICS (Right: Chest)    Relevant Problems   MUSCULOSKELETAL   (+) Chronic bilateral low back pain without sciatica      NEURO/PSYCH   (+) Anxiety associated with depression   (+) Chronic bilateral low back pain without sciatica      Behavioral Health   (+) Attention deficit hyperactivity disorder      Orthopedic/Musculoskeletal   (+) Thoracic outlet syndrome      Other   (+) Elevated bilirubin        Physical Exam    Airway    Mallampati score: I  TM Distance: >3 FB  Neck ROM: full     Dental       Cardiovascular      Pulmonary      Other Findings    Anesthesia Plan  ASA Score- 2     Anesthesia Type- general with ASA Monitors.         Additional Monitors:     Airway Plan: ETT.           Plan Factors-    Chart reviewed. EKG reviewed.  Existing labs reviewed. Patient summary reviewed.    Patient is not a current smoker.  Patient did not smoke on day of surgery.            Induction- intravenous.    Postoperative Plan- Plan for postoperative opioid use. Planned trial extubation    Perioperative Resuscitation Plan - Level 1 - Full Code.       Informed Consent- Anesthetic plan and risks discussed with patient.  I personally reviewed this patient with the CRNA. Discussed and agreed on the Anesthesia Plan with the CRNA..      NPO Status:  No vitals data found for the desired time range.

## 2025-04-04 ENCOUNTER — APPOINTMENT (OUTPATIENT)
Dept: RADIOLOGY | Facility: HOSPITAL | Age: 25
DRG: 030 | End: 2025-04-04
Payer: COMMERCIAL

## 2025-04-04 ENCOUNTER — HOSPITAL ENCOUNTER (OUTPATIENT)
Facility: HOSPITAL | Age: 25
Setting detail: OUTPATIENT SURGERY
DRG: 030 | End: 2025-04-04
Attending: SURGERY | Admitting: SURGERY
Payer: COMMERCIAL

## 2025-04-04 ENCOUNTER — ANESTHESIA (OUTPATIENT)
Dept: PERIOP | Facility: HOSPITAL | Age: 25
DRG: 030 | End: 2025-04-04
Payer: COMMERCIAL

## 2025-04-04 DIAGNOSIS — G54.0 THORACIC OUTLET SYNDROME: Primary | ICD-10-CM

## 2025-04-04 PROCEDURE — 21899 UNLISTED PX NECK/THORAX: CPT

## 2025-04-04 PROCEDURE — S2900 ROBOTIC SURGICAL SYSTEM: HCPCS | Performed by: SURGERY

## 2025-04-04 PROCEDURE — 88307 TISSUE EXAM BY PATHOLOGIST: CPT | Performed by: PATHOLOGY

## 2025-04-04 PROCEDURE — 0PB14ZZ EXCISION OF 1 TO 2 RIBS, PERCUTANEOUS ENDOSCOPIC APPROACH: ICD-10-PCS | Performed by: SURGERY

## 2025-04-04 PROCEDURE — 88311 DECALCIFY TISSUE: CPT | Performed by: PATHOLOGY

## 2025-04-04 PROCEDURE — A7041 WATER SEAL DRAIN CONTAINER: HCPCS | Performed by: SURGERY

## 2025-04-04 PROCEDURE — 21899 UNLISTED PX NECK/THORAX: CPT | Performed by: SURGERY

## 2025-04-04 PROCEDURE — 71045 X-RAY EXAM CHEST 1 VIEW: CPT

## 2025-04-04 PROCEDURE — 94664 DEMO&/EVAL PT USE INHALER: CPT

## 2025-04-04 RX ORDER — SENNOSIDES 8.6 MG
1 TABLET ORAL DAILY
Status: DISCONTINUED | OUTPATIENT
Start: 2025-04-04 | End: 2025-04-07 | Stop reason: HOSPADM

## 2025-04-04 RX ORDER — ROCURONIUM BROMIDE 10 MG/ML
INJECTION, SOLUTION INTRAVENOUS AS NEEDED
Status: DISCONTINUED | OUTPATIENT
Start: 2025-04-04 | End: 2025-04-04

## 2025-04-04 RX ORDER — ONDANSETRON 2 MG/ML
4 INJECTION INTRAMUSCULAR; INTRAVENOUS ONCE AS NEEDED
Status: DISCONTINUED | OUTPATIENT
Start: 2025-04-04 | End: 2025-04-04 | Stop reason: HOSPADM

## 2025-04-04 RX ORDER — ACETAMINOPHEN 325 MG/1
650 TABLET ORAL EVERY 6 HOURS
Qty: 56 TABLET | Refills: 0 | Status: SHIPPED | OUTPATIENT
Start: 2025-04-04 | End: 2025-04-11

## 2025-04-04 RX ORDER — ONDANSETRON 2 MG/ML
INJECTION INTRAMUSCULAR; INTRAVENOUS AS NEEDED
Status: DISCONTINUED | OUTPATIENT
Start: 2025-04-04 | End: 2025-04-04

## 2025-04-04 RX ORDER — GABAPENTIN 300 MG/1
300 CAPSULE ORAL 3 TIMES DAILY
Qty: 63 CAPSULE | Refills: 0 | Status: SHIPPED | OUTPATIENT
Start: 2025-04-04 | End: 2025-04-25

## 2025-04-04 RX ORDER — HYDROMORPHONE HCL/PF 1 MG/ML
0.5 SYRINGE (ML) INJECTION
Status: DISCONTINUED | OUTPATIENT
Start: 2025-04-04 | End: 2025-04-04 | Stop reason: HOSPADM

## 2025-04-04 RX ORDER — HYDROMORPHONE HCL/PF 1 MG/ML
SYRINGE (ML) INJECTION AS NEEDED
Status: DISCONTINUED | OUTPATIENT
Start: 2025-04-04 | End: 2025-04-04

## 2025-04-04 RX ORDER — FENTANYL CITRATE/PF 50 MCG/ML
50 SYRINGE (ML) INJECTION
Status: DISCONTINUED | OUTPATIENT
Start: 2025-04-04 | End: 2025-04-04 | Stop reason: HOSPADM

## 2025-04-04 RX ORDER — SODIUM CHLORIDE, SODIUM LACTATE, POTASSIUM CHLORIDE, CALCIUM CHLORIDE 600; 310; 30; 20 MG/100ML; MG/100ML; MG/100ML; MG/100ML
60 INJECTION, SOLUTION INTRAVENOUS CONTINUOUS
Status: DISCONTINUED | OUTPATIENT
Start: 2025-04-04 | End: 2025-04-05

## 2025-04-04 RX ORDER — CELECOXIB 200 MG/1
200 CAPSULE ORAL ONCE
Status: COMPLETED | OUTPATIENT
Start: 2025-04-04 | End: 2025-04-04

## 2025-04-04 RX ORDER — FENTANYL CITRATE 50 UG/ML
INJECTION, SOLUTION INTRAMUSCULAR; INTRAVENOUS AS NEEDED
Status: DISCONTINUED | OUTPATIENT
Start: 2025-04-04 | End: 2025-04-04

## 2025-04-04 RX ORDER — PANTOPRAZOLE SODIUM 40 MG/10ML
40 INJECTION, POWDER, LYOPHILIZED, FOR SOLUTION INTRAVENOUS
Status: DISCONTINUED | OUTPATIENT
Start: 2025-04-04 | End: 2025-04-07

## 2025-04-04 RX ORDER — GABAPENTIN 300 MG/1
600 CAPSULE ORAL ONCE
Status: COMPLETED | OUTPATIENT
Start: 2025-04-04 | End: 2025-04-04

## 2025-04-04 RX ORDER — TRAZODONE HYDROCHLORIDE 100 MG/1
100 TABLET ORAL
Status: DISCONTINUED | OUTPATIENT
Start: 2025-04-04 | End: 2025-04-07 | Stop reason: HOSPADM

## 2025-04-04 RX ORDER — OXYCODONE HYDROCHLORIDE 5 MG/1
5 TABLET ORAL EVERY 4 HOURS PRN
Refills: 0 | Status: DISCONTINUED | OUTPATIENT
Start: 2025-04-04 | End: 2025-04-07 | Stop reason: HOSPADM

## 2025-04-04 RX ORDER — HEPARIN SODIUM 5000 [USP'U]/ML
5000 INJECTION, SOLUTION INTRAVENOUS; SUBCUTANEOUS
Status: COMPLETED | OUTPATIENT
Start: 2025-04-04 | End: 2025-04-04

## 2025-04-04 RX ORDER — DIPHENHYDRAMINE HYDROCHLORIDE 50 MG/ML
12.5 INJECTION, SOLUTION INTRAMUSCULAR; INTRAVENOUS ONCE AS NEEDED
Status: DISCONTINUED | OUTPATIENT
Start: 2025-04-04 | End: 2025-04-04 | Stop reason: HOSPADM

## 2025-04-04 RX ORDER — DOCUSATE SODIUM 100 MG/1
100 CAPSULE, LIQUID FILLED ORAL 2 TIMES DAILY
Status: DISCONTINUED | OUTPATIENT
Start: 2025-04-04 | End: 2025-04-07 | Stop reason: HOSPADM

## 2025-04-04 RX ORDER — DEXAMETHASONE SODIUM PHOSPHATE 10 MG/ML
INJECTION, SOLUTION INTRAMUSCULAR; INTRAVENOUS AS NEEDED
Status: DISCONTINUED | OUTPATIENT
Start: 2025-04-04 | End: 2025-04-04

## 2025-04-04 RX ORDER — SODIUM CHLORIDE, SODIUM LACTATE, POTASSIUM CHLORIDE, CALCIUM CHLORIDE 600; 310; 30; 20 MG/100ML; MG/100ML; MG/100ML; MG/100ML
INJECTION, SOLUTION INTRAVENOUS CONTINUOUS PRN
Status: DISCONTINUED | OUTPATIENT
Start: 2025-04-04 | End: 2025-04-04

## 2025-04-04 RX ORDER — LIDOCAINE HYDROCHLORIDE 20 MG/ML
INJECTION, SOLUTION EPIDURAL; INFILTRATION; INTRACAUDAL; PERINEURAL AS NEEDED
Status: DISCONTINUED | OUTPATIENT
Start: 2025-04-04 | End: 2025-04-04

## 2025-04-04 RX ORDER — ONDANSETRON 2 MG/ML
4 INJECTION INTRAMUSCULAR; INTRAVENOUS EVERY 6 HOURS PRN
Status: DISCONTINUED | OUTPATIENT
Start: 2025-04-04 | End: 2025-04-07 | Stop reason: HOSPADM

## 2025-04-04 RX ORDER — ACETAMINOPHEN 325 MG/1
975 TABLET ORAL EVERY 6 HOURS
Status: DISCONTINUED | OUTPATIENT
Start: 2025-04-04 | End: 2025-04-07 | Stop reason: HOSPADM

## 2025-04-04 RX ORDER — SODIUM CHLORIDE 9 MG/ML
INJECTION, SOLUTION INTRAVENOUS CONTINUOUS PRN
Status: DISCONTINUED | OUTPATIENT
Start: 2025-04-04 | End: 2025-04-04

## 2025-04-04 RX ORDER — METHOCARBAMOL 500 MG/1
500 TABLET, FILM COATED ORAL EVERY 6 HOURS PRN
Status: DISCONTINUED | OUTPATIENT
Start: 2025-04-04 | End: 2025-04-07 | Stop reason: HOSPADM

## 2025-04-04 RX ORDER — ACETAMINOPHEN 325 MG/1
975 TABLET ORAL ONCE
Status: COMPLETED | OUTPATIENT
Start: 2025-04-04 | End: 2025-04-04

## 2025-04-04 RX ORDER — MIDAZOLAM HYDROCHLORIDE 2 MG/2ML
INJECTION, SOLUTION INTRAMUSCULAR; INTRAVENOUS AS NEEDED
Status: DISCONTINUED | OUTPATIENT
Start: 2025-04-04 | End: 2025-04-04

## 2025-04-04 RX ORDER — OXYCODONE HYDROCHLORIDE 5 MG/1
5 TABLET ORAL EVERY 4 HOURS PRN
Qty: 12 TABLET | Refills: 0 | Status: SHIPPED | OUTPATIENT
Start: 2025-04-04

## 2025-04-04 RX ORDER — SODIUM CHLORIDE, SODIUM LACTATE, POTASSIUM CHLORIDE, CALCIUM CHLORIDE 600; 310; 30; 20 MG/100ML; MG/100ML; MG/100ML; MG/100ML
100 INJECTION, SOLUTION INTRAVENOUS CONTINUOUS
Status: DISCONTINUED | OUTPATIENT
Start: 2025-04-04 | End: 2025-04-04

## 2025-04-04 RX ORDER — GABAPENTIN 300 MG/1
300 CAPSULE ORAL 3 TIMES DAILY
Status: DISCONTINUED | OUTPATIENT
Start: 2025-04-04 | End: 2025-04-07 | Stop reason: HOSPADM

## 2025-04-04 RX ORDER — CALCIUM CARBONATE 500 MG/1
500 TABLET, CHEWABLE ORAL DAILY PRN
Status: DISCONTINUED | OUTPATIENT
Start: 2025-04-04 | End: 2025-04-07 | Stop reason: HOSPADM

## 2025-04-04 RX ORDER — POLYETHYLENE GLYCOL 3350 17 G/17G
17 POWDER, FOR SOLUTION ORAL DAILY PRN
Status: DISCONTINUED | OUTPATIENT
Start: 2025-04-04 | End: 2025-04-07 | Stop reason: HOSPADM

## 2025-04-04 RX ORDER — PROPOFOL 10 MG/ML
INJECTION, EMULSION INTRAVENOUS AS NEEDED
Status: DISCONTINUED | OUTPATIENT
Start: 2025-04-04 | End: 2025-04-04

## 2025-04-04 RX ORDER — CEFAZOLIN SODIUM 2 G/50ML
2000 SOLUTION INTRAVENOUS ONCE
Status: COMPLETED | OUTPATIENT
Start: 2025-04-04 | End: 2025-04-04

## 2025-04-04 RX ORDER — ENOXAPARIN SODIUM 100 MG/ML
40 INJECTION SUBCUTANEOUS DAILY
Status: DISCONTINUED | OUTPATIENT
Start: 2025-04-05 | End: 2025-04-07 | Stop reason: HOSPADM

## 2025-04-04 RX ADMIN — SENNOSIDES 8.6 MG: 8.6 TABLET, FILM COATED ORAL at 12:50

## 2025-04-04 RX ADMIN — FENTANYL CITRATE 50 MCG: 50 INJECTION INTRAMUSCULAR; INTRAVENOUS at 11:48

## 2025-04-04 RX ADMIN — CELECOXIB 200 MG: 200 CAPSULE ORAL at 06:19

## 2025-04-04 RX ADMIN — GABAPENTIN 300 MG: 300 CAPSULE ORAL at 17:29

## 2025-04-04 RX ADMIN — GABAPENTIN 300 MG: 300 CAPSULE ORAL at 21:06

## 2025-04-04 RX ADMIN — GABAPENTIN 600 MG: 300 CAPSULE ORAL at 06:20

## 2025-04-04 RX ADMIN — TRAZODONE HYDROCHLORIDE 100 MG: 100 TABLET ORAL at 21:08

## 2025-04-04 RX ADMIN — HYDROMORPHONE HYDROCHLORIDE 0.5 MG: 1 INJECTION, SOLUTION INTRAMUSCULAR; INTRAVENOUS; SUBCUTANEOUS at 08:54

## 2025-04-04 RX ADMIN — SODIUM CHLORIDE, SODIUM LACTATE, POTASSIUM CHLORIDE, AND CALCIUM CHLORIDE: .6; .31; .03; .02 INJECTION, SOLUTION INTRAVENOUS at 07:12

## 2025-04-04 RX ADMIN — SUGAMMADEX 200 MG: 100 INJECTION, SOLUTION INTRAVENOUS at 11:20

## 2025-04-04 RX ADMIN — ROCURONIUM 60 MG: 50 INJECTION, SOLUTION INTRAVENOUS at 07:29

## 2025-04-04 RX ADMIN — DOCUSATE SODIUM 100 MG: 100 CAPSULE, LIQUID FILLED ORAL at 12:50

## 2025-04-04 RX ADMIN — OXYCODONE HYDROCHLORIDE 5 MG: 5 TABLET ORAL at 21:06

## 2025-04-04 RX ADMIN — PANTOPRAZOLE SODIUM 40 MG: 40 INJECTION, POWDER, FOR SOLUTION INTRAVENOUS at 15:03

## 2025-04-04 RX ADMIN — GABAPENTIN 300 MG: 300 CAPSULE ORAL at 12:50

## 2025-04-04 RX ADMIN — PROPOFOL 200 MG: 10 INJECTION, EMULSION INTRAVENOUS at 07:28

## 2025-04-04 RX ADMIN — DEXAMETHASONE SODIUM PHOSPHATE 10 MG: 10 INJECTION, SOLUTION INTRAMUSCULAR; INTRAVENOUS at 07:29

## 2025-04-04 RX ADMIN — ROCURONIUM 10 MG: 50 INJECTION, SOLUTION INTRAVENOUS at 09:34

## 2025-04-04 RX ADMIN — FENTANYL CITRATE 50 MCG: 50 INJECTION INTRAMUSCULAR; INTRAVENOUS at 12:07

## 2025-04-04 RX ADMIN — ACETAMINOPHEN 975 MG: 325 TABLET, FILM COATED ORAL at 17:28

## 2025-04-04 RX ADMIN — CEFAZOLIN SODIUM 2000 MG: 2 SOLUTION INTRAVENOUS at 07:55

## 2025-04-04 RX ADMIN — FENTANYL CITRATE 100 MCG: 50 INJECTION INTRAMUSCULAR; INTRAVENOUS at 07:28

## 2025-04-04 RX ADMIN — PHENYLEPHRINE HYDROCHLORIDE 20 MCG/MIN: 10 INJECTION INTRAVENOUS at 07:45

## 2025-04-04 RX ADMIN — SODIUM CHLORIDE: 0.9 INJECTION, SOLUTION INTRAVENOUS at 07:34

## 2025-04-04 RX ADMIN — METHOCARBAMOL 500 MG: 500 TABLET ORAL at 15:03

## 2025-04-04 RX ADMIN — ACETAMINOPHEN 975 MG: 325 TABLET, FILM COATED ORAL at 12:50

## 2025-04-04 RX ADMIN — LIDOCAINE HYDROCHLORIDE 100 MG: 20 INJECTION, SOLUTION EPIDURAL; INFILTRATION; INTRACAUDAL; PERINEURAL at 07:28

## 2025-04-04 RX ADMIN — ROCURONIUM 10 MG: 50 INJECTION, SOLUTION INTRAVENOUS at 08:54

## 2025-04-04 RX ADMIN — ACETAMINOPHEN 975 MG: 325 TABLET, FILM COATED ORAL at 06:20

## 2025-04-04 RX ADMIN — SODIUM CHLORIDE, SODIUM LACTATE, POTASSIUM CHLORIDE, AND CALCIUM CHLORIDE 60 ML/HR: .6; .31; .03; .02 INJECTION, SOLUTION INTRAVENOUS at 12:44

## 2025-04-04 RX ADMIN — OXYCODONE HYDROCHLORIDE 5 MG: 5 TABLET ORAL at 14:06

## 2025-04-04 RX ADMIN — ONDANSETRON 4 MG: 2 INJECTION INTRAMUSCULAR; INTRAVENOUS at 10:48

## 2025-04-04 RX ADMIN — HEPARIN SODIUM 5000 UNITS: 5000 INJECTION, SOLUTION INTRAVENOUS; SUBCUTANEOUS at 06:40

## 2025-04-04 RX ADMIN — SODIUM CHLORIDE, SODIUM LACTATE, POTASSIUM CHLORIDE, AND CALCIUM CHLORIDE: .6; .31; .03; .02 INJECTION, SOLUTION INTRAVENOUS at 08:21

## 2025-04-04 RX ADMIN — METHOCARBAMOL 500 MG: 500 TABLET ORAL at 21:08

## 2025-04-04 RX ADMIN — DOCUSATE SODIUM 100 MG: 100 CAPSULE, LIQUID FILLED ORAL at 17:29

## 2025-04-04 RX ADMIN — MIDAZOLAM 2 MG: 1 INJECTION INTRAMUSCULAR; INTRAVENOUS at 07:22

## 2025-04-04 NOTE — INTERVAL H&P NOTE
H&P reviewed. After examining the patient I find no changes in the patients condition since the H&P had been written.    OR for robotic right first rib resection

## 2025-04-04 NOTE — ANESTHESIA POSTPROCEDURE EVALUATION
Post-Op Assessment Note    CV Status:  Stable    Pain management: adequate       Mental Status:  Awake and sleepy   Hydration Status:  Euvolemic   PONV Controlled:  Controlled   Airway Patency:  Patent  Airway: intubated     Post Op Vitals Reviewed: Yes    No anethesia notable event occurred.    Staff: CRNA           Last Filed PACU Vitals:  Vitals Value Taken Time   Temp 97.6 °F (36.4 °C) 04/04/25 1142   Pulse 92 04/04/25 1143   /68 04/04/25 1142   Resp 20 04/04/25 1143   SpO2 99 % 04/04/25 1143   Vitals shown include unfiled device data.    Modified Anastasiia:     Vitals Value Taken Time   Activity 2 04/04/25 1142   Respiration 2 04/04/25 1142   Circulation 2 04/04/25 1142   Consciousness 1 04/04/25 1142   Oxygen Saturation 1 04/04/25 1142     Modified Anastasiia Score: 8

## 2025-04-04 NOTE — DISCHARGE INSTR - AVS FIRST PAGE
VATS (Video-assisted Thoracoscopic Surgery) to remove your right first rib    You underwent a minimally invasive thoracic surgery. Below are your discharge instructions.     Incision Care:  - Your incisions were closed with stitches underneath of the skin which will dissolve. There is purple surgical glue on top of the incisions. The surgical glue will flake off over the next few weeks. There is a non-dissolvable stitch at your chest tube site which will be removed in the office.   - After your chest tube was removed, a gauze dressing was placed over the incision. This gauze can be removed in 24 hours. After this time you may place some gauze over your chest tube site if it is leaking some fluid.  - You do not need dressings over your other incisions.  Do not apply any cream or ointments to the incisions unless instructed by your surgeon.  - You may shower daily - no soaking in a tub bath. Wash your incisions gently with soap and water and pat dry. Do not scrub the incisions.  - Bruising at your incisions is normal. This will resolve over the next few weeks.     Activity  - No lifting greater than 10lbs until you are evaluated in the office.   - Walking and light activity is encouraged. Recommend you are active during the day and using your Incentive Spirometer when you are sitting for a prolonged period.   - No driving while you are using narcotic pain medications. If you are no longer taking narcotics and considering driving, you must make sure you can quickly react to changes on the road. This can be challenging in the first few weeks after surgery.     Pain:  - Some degree of pain or discomfort after surgery is expected. This pain may become more noticeable after discharge as you start moving around more.   - Your pain regiment includes the following:   - Tylenol 650 mg tablet. Take 1 tablet, every 6 hours for 1 week.    - Gabapentin 300 mg tablet. Take 1 tablet, 3x a day for 3 weeks.   - Ibuprofen 600 mg  tablet, every 6 hours for 1 week.    - Oxycodone (Narcotic) 5mg tablet. Take 1 tablet, every 4-6 hours as needed for pain.     Medications:  - Continue on your home medications including any blood thinner and aspirin, as instructed.     Diet:  - You should continue on your normal diet.     Bowel Regiment:  - Constipation after surgery while on narcotic pain medications is normal.  - You should continue taking a bowel regiment while on a narcotic pain medication to keep your bowel movements soft. Your discharge bowel regiment:   - Docusate (Colace) 100mg tablet. Take 1 tablet, twice a day.    - Senna 8.6mg tablet. Take 1 tablet daily.   - If your bowel movements become lose, stop taking the bowel regiment above.     Follow-up:  - You have a scheduled follow-up appointment on: 4/14/25 at 3:40 pm  - Obtain your Chest X-ray prior to your scheduled post-operative appointment.   - A couple of days after discharge our office will call you to check in with how you are recovering at home.     Concerns:  - Call the office for any questions or concerns. Many postoperative issues can be sorted out over the phone.   - Call the office or go to the Emergency Department if you develop a fever greater than 101, persistent chills, persistent nausea/vomiting, worsening or uncontrolled pain, and/or increasing redness or foul smelling drainage from an incision.     Thoracic Surgery Office: [phone]    Dr. William Burfeind, MD Rachael Hart, PA-C Dr. Meredith Harrison, MD Amylyn Mortimer, PA-C Dr. Dustin Manchester, MD Dr. Stephen Dingley, DO

## 2025-04-04 NOTE — ANESTHESIA POSTPROCEDURE EVALUATION
Post-Op Assessment Note    Last Filed PACU Vitals:  Vitals Value Taken Time   Temp 97.6 °F (36.4 °C) 04/04/25 1142   Pulse 88 04/04/25 1221   /58 04/04/25 1221   Resp 14 04/04/25 1221   SpO2 94 % 04/04/25 1221       Modified Anastasiia:     Vitals Value Taken Time   Activity 2 04/04/25 1221   Respiration 2 04/04/25 1221   Circulation 2 04/04/25 1221   Consciousness 1 04/04/25 1221   Oxygen Saturation 2 04/04/25 1221     Modified Anastasiia Score: 9

## 2025-04-04 NOTE — OP NOTE
OPERATIVE REPORT  PATIENT NAME: Jay Ortiz    :  2000  MRN: [id number]  Pt Location: BE OR ROOM 15    SURGERY DATE: 2025    Surgeons and Role:     * Dc Gibson DO - Primary     * Vaughn Hill MD - Assisting     * Brittaney Mireles PA-C - Assisting    Preop Diagnosis:  Thoracic outlet syndrome [G54.0]    Post-Op Diagnosis Codes:     * Thoracic outlet syndrome [G54.0]    Procedure(s):  Robotic first rib resection    Specimen(s):  ID Type Source Tests Collected by Time Destination   1 : Right 1st rib Tissue Bone TISSUE EXAM Dc Drake DO Paige 2025 1052        Estimated Blood Loss:   Minimal    Drains:  Chest Tube 1 Right Pleural 24 Fr. (Active)   Number of days: 0       Anesthesia Type:   General    Operative Indications:  Thoracic outlet syndrome [G54.0]      Operative Findings:  First right rib with additional piece of first cartilage              Complications:   None    Procedure and Technique:  The patient is a 24 year old male with bilateral neurological thoracic outlet syndrome. 25 he underwent a robotic left first rib resection with excellent results. He returns today to have his right first rib removed.      He was taken back to the operating room and moved to the operating room table. Name and procedure were confirmed. A double lumen endotracheal tube was placed and position was confirmed with a pediatric bronchoscope. He was positioned in the standard left lateral decubitus position. He was prepped with chloraprep and draped in a sterile manner. A timeout was called and complete. I started with an 8 port in the posterior axillary line, 4 fingers breadth anterior to the tip of the scapula. Two additional 8mm ports were placed. One 4 finger breadths anterior to this in the same rib space, and one just below the tip of the scapula. An assist 15 port was placed low and anterior. Insufflation was used with a pressure of 10. Exparel blocks were performed over  8 levels using 20 ml Exparel, 20 ml NS, and 20 ml bupivicaine.      The first rib was located and the pleura and musculature was taken off it from the RAMIN vessels and in a posterior direction using the hook electrocautery. Muscle fibers were brushed away with the electrocautery exposing the full width of the first rib. The costocartilage was divided with hook electrocautery.     Once the soft tissue was removed posteriorly the Midas Hector drill was used at the bedside to divide the rib posteriorly. Once divided the dissection was continued from the inferior aspect of the rib while rolling it away from the subclavian vessels. The scalenes were divided and the subclavian vessels and brachial plexus were protected. The rib fragment was then removed from the assist port. An additional portion of cartilage was removed. The resection site was hemostatic. The ports were made hemostatic, a 24Fr Derek drain was placed through the assist port. The lung was reinflated. Access sites were closed with 3-0 vicryl, 4-0 monocryl and glue. The tube was secured with 0 prolene.      TERENCE Zamora was present and assisted at the bedside with changing instruments, suctioning and closing.   No qualified resident was available.      I was present for the entire procedure.    Patient Disposition:  PACU              SIGNATURE: Dc Gibson DO  DATE: April 4, 2025  TIME: 11:25 AM

## 2025-04-05 LAB
ANION GAP SERPL CALCULATED.3IONS-SCNC: 8 MMOL/L (ref 4–13)
BUN SERPL-MCNC: 10 MG/DL (ref 5–25)
CALCIUM SERPL-MCNC: 8.9 MG/DL (ref 8.4–10.2)
CHLORIDE SERPL-SCNC: 106 MMOL/L (ref 96–108)
CO2 SERPL-SCNC: 28 MMOL/L (ref 21–32)
CREAT SERPL-MCNC: 0.73 MG/DL (ref 0.6–1.3)
ERYTHROCYTE [DISTWIDTH] IN BLOOD BY AUTOMATED COUNT: 12.1 % (ref 11.6–15.1)
GFR SERPL CREATININE-BSD FRML MDRD: 130 ML/MIN/1.73SQ M
GLUCOSE SERPL-MCNC: 86 MG/DL (ref 65–140)
HCT VFR BLD AUTO: 39.4 % (ref 36.5–49.3)
HGB BLD-MCNC: 14 G/DL (ref 12–17)
MAGNESIUM SERPL-MCNC: 2.1 MG/DL (ref 1.9–2.7)
MCH RBC QN AUTO: 32.6 PG (ref 26.8–34.3)
MCHC RBC AUTO-ENTMCNC: 35.5 G/DL (ref 31.4–37.4)
MCV RBC AUTO: 92 FL (ref 82–98)
PLATELET # BLD AUTO: 243 THOUSANDS/UL (ref 149–390)
PMV BLD AUTO: 9 FL (ref 8.9–12.7)
POTASSIUM SERPL-SCNC: 3.5 MMOL/L (ref 3.5–5.3)
RBC # BLD AUTO: 4.3 MILLION/UL (ref 3.88–5.62)
SODIUM SERPL-SCNC: 142 MMOL/L (ref 135–147)
WBC # BLD AUTO: 12.97 THOUSAND/UL (ref 4.31–10.16)

## 2025-04-05 PROCEDURE — 99024 POSTOP FOLLOW-UP VISIT: CPT | Performed by: THORACIC SURGERY (CARDIOTHORACIC VASCULAR SURGERY)

## 2025-04-05 PROCEDURE — 80048 BASIC METABOLIC PNL TOTAL CA: CPT

## 2025-04-05 PROCEDURE — 85027 COMPLETE CBC AUTOMATED: CPT

## 2025-04-05 PROCEDURE — 94664 DEMO&/EVAL PT USE INHALER: CPT

## 2025-04-05 PROCEDURE — 83735 ASSAY OF MAGNESIUM: CPT

## 2025-04-05 RX ORDER — HYDROMORPHONE HCL/PF 1 MG/ML
0.5 SYRINGE (ML) INJECTION
Status: DISCONTINUED | OUTPATIENT
Start: 2025-04-05 | End: 2025-04-05

## 2025-04-05 RX ORDER — KETOROLAC TROMETHAMINE 30 MG/ML
15 INJECTION, SOLUTION INTRAMUSCULAR; INTRAVENOUS EVERY 6 HOURS SCHEDULED
Status: DISCONTINUED | OUTPATIENT
Start: 2025-04-05 | End: 2025-04-05

## 2025-04-05 RX ORDER — KETOROLAC TROMETHAMINE 30 MG/ML
15 INJECTION, SOLUTION INTRAMUSCULAR; INTRAVENOUS EVERY 6 HOURS SCHEDULED
Status: DISCONTINUED | OUTPATIENT
Start: 2025-04-05 | End: 2025-04-06

## 2025-04-05 RX ADMIN — OXYCODONE HYDROCHLORIDE 5 MG: 5 TABLET ORAL at 09:11

## 2025-04-05 RX ADMIN — ACETAMINOPHEN 975 MG: 325 TABLET, FILM COATED ORAL at 17:12

## 2025-04-05 RX ADMIN — TRAZODONE HYDROCHLORIDE 100 MG: 100 TABLET ORAL at 21:41

## 2025-04-05 RX ADMIN — KETOROLAC TROMETHAMINE 15 MG: 30 INJECTION, SOLUTION INTRAMUSCULAR; INTRAVENOUS at 17:13

## 2025-04-05 RX ADMIN — GABAPENTIN 300 MG: 300 CAPSULE ORAL at 17:12

## 2025-04-05 RX ADMIN — PANTOPRAZOLE SODIUM 40 MG: 40 INJECTION, POWDER, FOR SOLUTION INTRAVENOUS at 09:08

## 2025-04-05 RX ADMIN — SODIUM CHLORIDE, SODIUM LACTATE, POTASSIUM CHLORIDE, AND CALCIUM CHLORIDE 60 ML/HR: .6; .31; .03; .02 INJECTION, SOLUTION INTRAVENOUS at 04:16

## 2025-04-05 RX ADMIN — METHOCARBAMOL 500 MG: 500 TABLET ORAL at 19:26

## 2025-04-05 RX ADMIN — OXYCODONE HYDROCHLORIDE 5 MG: 5 TABLET ORAL at 19:26

## 2025-04-05 RX ADMIN — SENNOSIDES 8.6 MG: 8.6 TABLET, FILM COATED ORAL at 09:05

## 2025-04-05 RX ADMIN — GABAPENTIN 300 MG: 300 CAPSULE ORAL at 09:05

## 2025-04-05 RX ADMIN — ENOXAPARIN SODIUM 40 MG: 40 INJECTION SUBCUTANEOUS at 09:08

## 2025-04-05 RX ADMIN — DOCUSATE SODIUM 100 MG: 100 CAPSULE, LIQUID FILLED ORAL at 17:12

## 2025-04-05 RX ADMIN — KETOROLAC TROMETHAMINE 15 MG: 30 INJECTION, SOLUTION INTRAMUSCULAR; INTRAVENOUS at 09:31

## 2025-04-05 RX ADMIN — OXYCODONE HYDROCHLORIDE 5 MG: 5 TABLET ORAL at 14:43

## 2025-04-05 RX ADMIN — KETOROLAC TROMETHAMINE 15 MG: 30 INJECTION, SOLUTION INTRAMUSCULAR; INTRAVENOUS at 23:52

## 2025-04-05 RX ADMIN — METHOCARBAMOL 500 MG: 500 TABLET ORAL at 09:08

## 2025-04-05 RX ADMIN — ACETAMINOPHEN 975 MG: 325 TABLET, FILM COATED ORAL at 12:20

## 2025-04-05 RX ADMIN — GABAPENTIN 300 MG: 300 CAPSULE ORAL at 21:41

## 2025-04-05 RX ADMIN — SERTRALINE 50 MG: 50 TABLET, FILM COATED ORAL at 09:05

## 2025-04-05 RX ADMIN — ACETAMINOPHEN 975 MG: 325 TABLET, FILM COATED ORAL at 23:52

## 2025-04-05 RX ADMIN — DOCUSATE SODIUM 100 MG: 100 CAPSULE, LIQUID FILLED ORAL at 09:05

## 2025-04-05 NOTE — QUICK NOTE
Postop Check    Procedure: Robotic first rib resection    Subjective:  No acute distress.  Resting comfortably in bed.  Patient reports moderate pain.  Denies any nausea, vomiting, fever, chills, shortness of breath.  Has been tolerating a diet.  Voiding.    Objective  Vitals:    04/04/25 1221 04/04/25 1243 04/04/25 1529 04/04/25 1715   BP: 108/58 116/73 116/67    BP Location: Left arm  Right arm    Pulse: 88 79 88 84   Resp: 14  18    Temp:  97.9 °F (36.6 °C) 97.8 °F (36.6 °C)    TempSrc:   Oral    SpO2: 94% 94% 94%    Weight:       Height:             GENERAL: No acute distress, resting comfortably in bed  CV: Regular rate, appears well-perfused  LUNGS: Nonlabored respirations, normal work of breathing.  Chest wall incisions are clean dry and intact.  Chest tube in place to waterseal with no air leak.  Serosanguineous drainage.  ABDOMEN: Abdomen soft, non-tender, nondistended.    Assessment and Plan:  Status post robotic first rib resection.  Overall hemodynamically stable and doing well postoperatively.    Continue chest tube to waterseal  Regular diet  IV fluids  Incentive spirometry  DVT prophylaxis  Analgesia  Monitor and replete electrolytes    Xavi Toth MD

## 2025-04-05 NOTE — PROGRESS NOTES
Progress Note - Thoracic    Name: Jay Ortiz 24 y.o. male I MRN: [id number]  Unit/Bed#: [facility] 519-01 I Date of Admission: 4/4/2025   Date of Service: 4/5/2025 I Hospital Day: 0    Assessment & Plan  Thoracic outlet syndrome  24-year-old male with history of thoracic outlet syndrome s/p first rib resection on 4/4    AVSS on room air    -340 cc serosanguineous, to atrium waterseal, no airleak    WBC 12.9  Hb 14  Remainder of a.m. labs pending    Plan  Reg diet  AM labs  DC IVF  DC CT  Tentative dispo today       Subjective : Seen and examined at bedside. NAEON.  Pain controlled.  Ambulating without issue.  Tolerating diet and having bowel function.    Objective :  Temp:  [97.6 °F (36.4 °C)-98 °F (36.7 °C)] 98 °F (36.7 °C)  HR:  [78-88] 78  BP: (102-129)/(54-73) 102/54  Resp:  [12-25] 17  SpO2:  [94 %-100 %] 94 %  O2 Device: None (Room air)    I/O         04/03 0701  04/04 0700 04/04 0701  04/05 0700    P.O.  443    I.V. (mL/kg)  2464 (36.2)    Total Intake(mL/kg)  2907 (42.8)    Urine (mL/kg/hr)  1950 (1.7)    Chest Tube  190    Total Output  2140    Net  +767                Lines/Drains/Airways       Active Status       Name Placement date Placement time Site Days    Chest Tube 1 Right Pleural 24 Fr. 04/04/25  1118  Pleural  less than 1                  PE  General - no acute distress, responsive and cooperative  CV - warm, regular rate  Pulm - normal work of breathing, no respiratory distress  Abd - soft, AT, ND, incisions cdi, CT in place as above   Neuro - m/s grossly intact, cn grossly intact  Ext - moving all extremities       Lab Results: I have reviewed the following results:  Recent Labs     04/05/25  0444   WBC 12.97*   HGB 14.0   HCT 39.4

## 2025-04-05 NOTE — ASSESSMENT & PLAN NOTE
24-year-old male with history of thoracic outlet syndrome s/p first rib resection on 4/4    AVSS on room air    -340 cc serosanguineous, to atrium waterseal, no airleak    WBC 12.9  Hb 14  Remainder of a.m. labs pending    Plan  Reg diet  AM labs  DC IVF  DC CT  Tentative dispo today

## 2025-04-06 VITALS
WEIGHT: 150 LBS | DIASTOLIC BLOOD PRESSURE: 65 MMHG | TEMPERATURE: 98.3 F | HEART RATE: 85 BPM | BODY MASS INDEX: 24.11 KG/M2 | HEIGHT: 66 IN | OXYGEN SATURATION: 93 % | RESPIRATION RATE: 18 BRPM | SYSTOLIC BLOOD PRESSURE: 95 MMHG

## 2025-04-06 PROCEDURE — 94664 DEMO&/EVAL PT USE INHALER: CPT

## 2025-04-06 PROCEDURE — 99024 POSTOP FOLLOW-UP VISIT: CPT | Performed by: THORACIC SURGERY (CARDIOTHORACIC VASCULAR SURGERY)

## 2025-04-06 RX ORDER — POTASSIUM CHLORIDE 1500 MG/1
40 TABLET, EXTENDED RELEASE ORAL ONCE
Status: COMPLETED | OUTPATIENT
Start: 2025-04-06 | End: 2025-04-06

## 2025-04-06 RX ADMIN — METHOCARBAMOL 500 MG: 500 TABLET ORAL at 21:05

## 2025-04-06 RX ADMIN — ACETAMINOPHEN 975 MG: 325 TABLET, FILM COATED ORAL at 12:17

## 2025-04-06 RX ADMIN — ACETAMINOPHEN 975 MG: 325 TABLET, FILM COATED ORAL at 05:17

## 2025-04-06 RX ADMIN — ANTACID TABLETS 500 MG: 500 TABLET, CHEWABLE ORAL at 09:20

## 2025-04-06 RX ADMIN — DOCUSATE SODIUM 100 MG: 100 CAPSULE, LIQUID FILLED ORAL at 08:23

## 2025-04-06 RX ADMIN — GABAPENTIN 300 MG: 300 CAPSULE ORAL at 08:23

## 2025-04-06 RX ADMIN — SERTRALINE 50 MG: 50 TABLET, FILM COATED ORAL at 08:23

## 2025-04-06 RX ADMIN — PANTOPRAZOLE SODIUM 40 MG: 40 INJECTION, POWDER, FOR SOLUTION INTRAVENOUS at 08:24

## 2025-04-06 RX ADMIN — POTASSIUM CHLORIDE 40 MEQ: 1500 TABLET, EXTENDED RELEASE ORAL at 12:17

## 2025-04-06 RX ADMIN — ACETAMINOPHEN 975 MG: 325 TABLET, FILM COATED ORAL at 23:30

## 2025-04-06 RX ADMIN — OXYCODONE HYDROCHLORIDE 5 MG: 5 TABLET ORAL at 14:44

## 2025-04-06 RX ADMIN — ENOXAPARIN SODIUM 40 MG: 40 INJECTION SUBCUTANEOUS at 08:23

## 2025-04-06 RX ADMIN — GABAPENTIN 300 MG: 300 CAPSULE ORAL at 21:05

## 2025-04-06 RX ADMIN — POLYETHYLENE GLYCOL 3350 17 G: 17 POWDER, FOR SOLUTION ORAL at 14:44

## 2025-04-06 RX ADMIN — SENNOSIDES 8.6 MG: 8.6 TABLET, FILM COATED ORAL at 08:23

## 2025-04-06 RX ADMIN — GABAPENTIN 300 MG: 300 CAPSULE ORAL at 17:18

## 2025-04-06 RX ADMIN — TRAZODONE HYDROCHLORIDE 100 MG: 100 TABLET ORAL at 21:05

## 2025-04-06 RX ADMIN — METHOCARBAMOL 500 MG: 500 TABLET ORAL at 14:44

## 2025-04-06 RX ADMIN — ACETAMINOPHEN 975 MG: 325 TABLET, FILM COATED ORAL at 17:18

## 2025-04-06 RX ADMIN — DOCUSATE SODIUM 100 MG: 100 CAPSULE, LIQUID FILLED ORAL at 17:18

## 2025-04-06 RX ADMIN — OXYCODONE HYDROCHLORIDE 5 MG: 5 TABLET ORAL at 21:05

## 2025-04-06 RX ADMIN — KETOROLAC TROMETHAMINE 15 MG: 30 INJECTION, SOLUTION INTRAMUSCULAR; INTRAVENOUS at 05:17

## 2025-04-06 NOTE — PROGRESS NOTES
Progress Note - Surgery-General   Name: Jay Ortiz 24 y.o. male I MRN: [id number]  Unit/Bed#: [facility] 519-01 I Date of Admission: 4/4/2025   Date of Service: 4/5/2025 I Hospital Day: 0    Assessment & Plan  Thoracic outlet syndrome  24-year-old male with history of thoracic outlet syndrome s/p first rib resection on 4/4    Tmax 100.5 2230 4/5, otherwise VSS on room air    R CT: 500 cc serosanguineous with serous in tubing, to atrium waterseal, no airleak    Plan:  - Reg diet  - PRN pain and nausea control, toradol added  - Incentive spirometry, pulmonary toilet  - OOB, ambulate  - Consider DC CT if output decreases, post-pull CXR  - Tentative dispo pending chest tube    Please contact the SecureChat role for the Thoracic  service with any questions/concerns.    Subjective   No acute events overnight. Patient reports pain is mostly in his back, improving. They are tolerating their diet. They are having flatus but no BM. They are voiding. They are ambulating. They are using their IS to 500. They deny nausea, vomiting, chest pain, shortness of breath, fevers, chills.      Objective :  Temp:  [98 °F (36.7 °C)-99.4 °F (37.4 °C)] 99.4 °F (37.4 °C)  HR:  [78-82] 82  BP: (100-112)/(54-69) 112/69  Resp:  [16-20] 20  SpO2:  [94 %-96 %] 96 %  O2 Device: Nasal cannula    I/O         04/04 0701  04/05 0700 04/05 0701  04/06 0700    P.O. 443 120    I.V. (mL/kg) 3032 (44.6) 205 (3)    Total Intake(mL/kg) 3475 (51.1) 325 (4.8)    Urine (mL/kg/hr) 2650 (1.6) 950 (0.9)    Chest Tube 340 400    Total Output 2990 1350    Net +485 -1025                Lines/Drains/Airways       Active Status       Name Placement date Placement time Site Days    Chest Tube 1 Right Pleural 24 Fr. 04/04/25  1118  Pleural  1                  Physical Exam  Constitutional:       General: He is not in acute distress.  HENT:      Head: Normocephalic and atraumatic.      Right Ear: External ear normal.      Left Ear: External ear normal.      Nose: Nose  normal.      Mouth/Throat:      Pharynx: Oropharynx is clear.   Eyes:      Extraocular Movements: Extraocular movements intact.   Cardiovascular:      Rate and Rhythm: Normal rate.   Pulmonary:      Effort: Pulmonary effort is normal.      Comments: R chest tube in place.   Abdominal:      General: There is no distension.      Palpations: Abdomen is soft.      Tenderness: There is no abdominal tenderness.   Musculoskeletal:         General: No swelling.      Cervical back: Normal range of motion.   Skin:     General: Skin is warm and dry.      Comments: Incisions clean, dry, intact   Neurological:      Mental Status: He is alert. Mental status is at baseline.   Psychiatric:         Mood and Affect: Mood normal.           Lab Results: I have reviewed the following results:  Recent Labs     04/05/25  0444   WBC 12.97*   HGB 14.0   HCT 39.4      SODIUM 142   K 3.5      CO2 28   BUN 10   CREATININE 0.73   GLUC 86   MG 2.1       Imaging Results Review: I reviewed radiology reports from this admission including: chest xray.  Other Study Results Review: No additional pertinent studies reviewed.    VTE Pharmacologic Prophylaxis: VTE covered by:  enoxaparin, Subcutaneous, 40 mg at 04/05/25 0908     VTE Mechanical Prophylaxis: sequential compression device

## 2025-04-06 NOTE — ASSESSMENT & PLAN NOTE
24-year-old male with history of thoracic outlet syndrome s/p first rib resection on 4/4    Tmax 100.5 2230 4/5, otherwise VSS on room air    R CT: 500 cc serosanguineous with serous in tubing, to atrium waterseal, no airleak    Plan:  - Reg diet  - PRN pain and nausea control, toradol added  - Incentive spirometry, pulmonary toilet  - OOB, ambulate  - Consider DC CT if output decreases, post-pull CXR  - Tentative dispo pending chest tube

## 2025-04-07 ENCOUNTER — APPOINTMENT (INPATIENT)
Dept: RADIOLOGY | Facility: HOSPITAL | Age: 25
DRG: 030 | End: 2025-04-07
Payer: COMMERCIAL

## 2025-04-07 VITALS
OXYGEN SATURATION: 95 % | DIASTOLIC BLOOD PRESSURE: 57 MMHG | BODY MASS INDEX: 24.11 KG/M2 | SYSTOLIC BLOOD PRESSURE: 106 MMHG | HEART RATE: 72 BPM | RESPIRATION RATE: 15 BRPM | HEIGHT: 66 IN | TEMPERATURE: 98.7 F | WEIGHT: 150 LBS

## 2025-04-07 PROCEDURE — 71046 X-RAY EXAM CHEST 2 VIEWS: CPT

## 2025-04-07 PROCEDURE — 99024 POSTOP FOLLOW-UP VISIT: CPT | Performed by: SURGERY

## 2025-04-07 RX ORDER — PANTOPRAZOLE SODIUM 40 MG/1
40 TABLET, DELAYED RELEASE ORAL
Status: DISCONTINUED | OUTPATIENT
Start: 2025-04-07 | End: 2025-04-07 | Stop reason: HOSPADM

## 2025-04-07 RX ADMIN — OXYCODONE HYDROCHLORIDE 5 MG: 5 TABLET ORAL at 09:44

## 2025-04-07 RX ADMIN — OXYCODONE HYDROCHLORIDE 5 MG: 5 TABLET ORAL at 05:14

## 2025-04-07 RX ADMIN — SENNOSIDES 8.6 MG: 8.6 TABLET, FILM COATED ORAL at 08:01

## 2025-04-07 RX ADMIN — GABAPENTIN 300 MG: 300 CAPSULE ORAL at 08:01

## 2025-04-07 RX ADMIN — PANTOPRAZOLE SODIUM 40 MG: 40 TABLET, DELAYED RELEASE ORAL at 08:01

## 2025-04-07 RX ADMIN — ENOXAPARIN SODIUM 40 MG: 40 INJECTION SUBCUTANEOUS at 08:01

## 2025-04-07 RX ADMIN — METHOCARBAMOL 500 MG: 500 TABLET ORAL at 05:14

## 2025-04-07 RX ADMIN — ACETAMINOPHEN 975 MG: 325 TABLET, FILM COATED ORAL at 05:14

## 2025-04-07 RX ADMIN — DOCUSATE SODIUM 100 MG: 100 CAPSULE, LIQUID FILLED ORAL at 08:01

## 2025-04-07 RX ADMIN — ACETAMINOPHEN 975 MG: 325 TABLET, FILM COATED ORAL at 11:51

## 2025-04-07 RX ADMIN — SERTRALINE 50 MG: 50 TABLET, FILM COATED ORAL at 08:01

## 2025-04-07 NOTE — ASSESSMENT & PLAN NOTE
24-year-old male with history of thoracic outlet syndrome s/p first rib resection on 4/4.    R CT: *** cc serosanguineous with serous in tubing, to atrium waterseal, no airleak    Plan:  - Reg diet  - PRN pain and nausea control, toradol added  - Incentive spirometry, pulmonary toilet  - OOB, ambulate  - Consider DC CT if output decreases, post-pull CXR  - Tentative dispo pending chest tube

## 2025-04-07 NOTE — PROGRESS NOTES
Progress Note - Thoracic    Name: Jay Ortiz 24 y.o. male I MRN: [id number]  Unit/Bed#: [facility] 519-01 I Date of Admission: 4/4/2025   Date of Service: 4/6/2025 I Hospital Day: 0  { ?Quick Links I Problem List I PORCH I Billing Tip:94930}    ***  Assessment & Plan  Thoracic outlet syndrome  24-year-old male with history of thoracic outlet syndrome s/p first rib resection on 4/4.    R CT: *** cc serosanguineous with serous in tubing, to atrium waterseal, no airleak    Plan:  - Reg diet  - PRN pain and nausea control, toradol added  - Incentive spirometry, pulmonary toilet  - OOB, ambulate  - Consider DC CT if output decreases, post-pull CXR  - Tentative dispo pending chest tube    Please contact the SecureChat role for the Thoracic  service with any questions/concerns.    Subjective   No acute events overnight. Pain is well controlled. Denies N/V, fevers, chills, CP, SOB. Tolerating diet. Voiding.      Objective :{?Quick Links I ICU Summary I Vitals I I/Os I LDAs I Mobility (PT/OT) I Code Status / ACP   ?Quick Links I Active Meds I Pain Meds I Antibiotics I Anticoagulants:38445}  Temp:  [98.5 °F (36.9 °C)-100.5 °F (38.1 °C)] 99.2 °F (37.3 °C)  HR:  [76-96] 92  BP: (101-115)/(58-68) 115/68  Resp:  [18-22] 20  SpO2:  [92 %-94 %] 93 %  O2 Device: None (Room air)    I/O         04/05 0701  04/06 0700 04/06 0701  04/07 0700    P.O. 342 245    I.V. (mL/kg) 205 (3)     Total Intake(mL/kg) 547 (8) 245 (3.6)    Urine (mL/kg/hr) 1350 (0.8) 200 (0.2)    Chest Tube 500 100    Total Output 1850 300    Net -1303 -55                Lines/Drains/Airways       Active Status       Name Placement date Placement time Site Days    Chest Tube 1 Right Pleural 24 Fr. 04/04/25  1118  Pleural  2                  Physical Exam  Vitals reviewed.   Constitutional:       Appearance: Normal appearance.   HENT:      Head: Normocephalic and atraumatic.      Right Ear: External ear normal.      Left Ear: External ear normal.      Nose: Nose  normal.   Eyes:      Conjunctiva/sclera: Conjunctivae normal.   Cardiovascular:      Rate and Rhythm: Normal rate.      Comments: Appears well perfused  Pulmonary:      Effort: Pulmonary effort is normal. No respiratory distress.      Comments: Chest tube in place with output and settings as above. Incisions CDI.   Abdominal:      General: Abdomen is flat. There is no distension.      Palpations: Abdomen is soft.      Tenderness: There is no abdominal tenderness. There is no guarding or rebound.   Skin:     General: Skin is warm and dry.   Neurological:      General: No focal deficit present.      Mental Status: He is alert and oriented to person, place, and time.   Psychiatric:         Mood and Affect: Mood normal.         Behavior: Behavior normal.         {?Quick Links I Lab Review I Micro Results I Radiology I Cardiology:93204}  Lab Results: I have reviewed the following results:  Recent Labs     04/05/25  0444   WBC 12.97*   HGB 14.0   HCT 39.4      SODIUM 142   K 3.5      CO2 28   BUN 10   CREATININE 0.73   GLUC 86   MG 2.1       Imaging Results Review: No pertinent imaging studies reviewed.  Other Study Results Review: No additional pertinent studies reviewed.    VTE Pharmacologic Prophylaxis: Enoxaparin (Lovenox)  VTE Mechanical Prophylaxis: sequential compression device

## 2025-04-07 NOTE — QUICK NOTE
04/07/25    Procedure: Chest tube removal    Right chest tube removed in routine fashion without incident.  The patient tolerated the procedure well. A dry, sterile dressing was placed. Will check a pa/lat chest x-ray.     Brittaney Mireles PA-C

## 2025-04-07 NOTE — PROGRESS NOTES
The pantoprazole has / have been converted to Oral per [facility] IV-to-PO Auto-Conversion Protocol for Adults as approved by the Pharmacy and Therapeutics Committee. The patient met all eligible criteria:  1) Age = 18 years old   2) Received at least one dose of the IV form   3) Receiving at least one other scheduled oral/enteral medication   4) Tolerating an oral/enteral diet   and did not have any exclusions:   1) Critical care patient   2) Active GI bleed (IF assessing H2RAs or PPIs)   3) Continuous tube feeding (IF assessing cipro, doxycycline, levofloxacin, minocycline, rifampin, or voriconazole)   4) Receiving PO vancomycin (IF assessing metronidazole)   5) Persistent nausea and/or vomiting   6) Ileus or gastrointestinal obstruction   7) Nimisha/nasogastric tube set for continuous suction   8) Specific order not to automatically convert to PO (in the order's comments or if discussed in the most recent Infectious Disease or primary team's progress notes).

## 2025-04-07 NOTE — UTILIZATION REVIEW
NOTIFICATION OF INPATIENT ADMISSION   AUTHORIZATION REQUEST   SERVICING FACILITY:   [facility]  Address: [address] [place] [address]  Tax ID: [id number]  NPI: [id number] ATTENDING PROVIDER:  Attending Name and NPI#: Dc Gibson Do [[id number]]  Address: [address] [place] [address]  Phone: [phone]   ADMISSION INFORMATION:  Place of Service: Inpatient [facility]  Place of Service Code: 21  Inpatient Admission Date/Time: 4/7/25  9:46 AM  Discharge Date/Time: No discharge date for patient encounter.  Admitting Diagnosis Code/Description:  Thoracic outlet syndrome [G54.0]     UTILIZATION REVIEW CONTACT:  Fredis Foster Utilization   Network Utilization Review Department  Phone: [phone]  Fax: [phone]  Email: [email or web address]  Contact for approvals/pending authorizations, clinical reviews, and discharge.     PHYSICIAN ADVISORY SERVICES:  Medical Necessity Denial & [id number]-[id number] Review  Phone: [phone]  Fax: [phone]  Email: [email or web address]     DISCHARGE SUPPORT TEAM:  For Patients Discharge Needs & Updates  Phone: [phone] opt. 2 Fax: [phone]  Email: [email or web address]

## 2025-04-07 NOTE — DISCHARGE SUMMARY
Discharge Summary - Thoracic    Name: Jay Ortiz 24 y.o. male I MRN: [id number]  [facility] I Date of Admission: 4/4/2025   Date of Service: 4/7/2025 I Hospital Day: 0    Admission Date:   Admission Orders (From admission, onward)       Ordered        04/07/25 0945  INPATIENT ADMISSION  Once                           Discharge Date:  4/7/2025    Admitting Diagnosis: Thoracic outlet syndrome [G54.0]  Discharge Diagnosis: Thoracic outlet syndrome s/p first rib resection    Attending: Dr. Gibson    Consulting Physician(s): N/A    Procedures Performed: 4/4 robotic right first rib resection     Hospital Course: Patient admitted 4/4 for elective robotic right first rib resection.  Diet was advanced as tolerated.  Pain and nausea controlled as needed.  Chest tube monitored postoperatively and removed on postop day 3, when output was appropriate in volume and character.  Post pull chest x-ray was within normal limits.  On day of discharge, pain well-controlled.  Patient denying nausea, vomiting, fever, chills, shortness of breath.  Tolerating diet and voiding.  Passing flatus.  Patient deemed stable for discharge.  Discharge instructions discussed with patient by thoracic surgery team.  All questions answered at this time.    Condition at Discharge: Stable    Discharge instructions/Information to patient and family:   See after visit summary for information provided to patient and family. Discharge instructions discussed with patient by thoracic surgery team.  All questions answered at this time.    Provisions for Follow-Up Care:  See after visit summary for information related to follow-up care and any pertinent home health orders.      Disposition: Home    Planned Readmission: No    Discharge Statement:  I have spent a total time of 20 minutes in caring for this patient on the day of the visit/encounter.     Discharge Medications:  See after visit summary for reconciled discharge medications provided  to patient and family.      ---  Mariam Glover MD  General Surgery PGY-II

## 2025-04-08 ENCOUNTER — TRANSITIONAL CARE MANAGEMENT (OUTPATIENT)
Dept: FAMILY MEDICINE CLINIC | Facility: CLINIC | Age: 25
End: 2025-04-08

## 2025-04-08 NOTE — UTILIZATION REVIEW
NOTIFICATION OF ADMISSION DISCHARGE   This is a Notification of Discharge from [facility]. Please be advised that this patient has been discharge from our facility. Below you will find the admission and discharge date and time including the patient’s disposition.   UTILIZATION REVIEW CONTACT:  Utilization Review Assistants  Network Utilization Review Department  Phone: [phone] x carefully listen to the prompts. All voicemails are confidential.  Email: [email or web address]     ADMISSION INFORMATION  PRESENTATION DATE: 4/4/2025  5:55 AM  OBERVATION ADMISSION DATE: N/A  INPATIENT ADMISSION DATE: 4/7/25  9:46 AM   DISCHARGE DATE: 4/7/2025  3:41 PM   DISPOSITION:Home/Self Care    Network Utilization Review Department  ATTENTION: Please call with any questions or concerns to [phone] and carefully listen to the prompts so that you are directed to the right person. All voicemails are confidential.   For Discharge needs, contact Care Management DC Support Team at [phone] opt. [phone]  Send all requests for admission clinical reviews, approved or denied determinations and any other requests to dedicated fax number below belonging to the campus where the patient is receiving treatment. List of dedicated fax numbers for the Facilities:  FACILITY NAME UR FAX NUMBER   ADMISSION DENIALS (Administrative/Medical Necessity) [phone]   DISCHARGE SUPPORT TEAM ([facility]) [phone]   PARENT CHILD HEALTH (Maternity/NICU/Pediatrics) [phone]   [facility] [phone]   [facility] [phone]   [facility] [phone]   [facility] [phone]   [facility] [phone]   [facility] [phone]   [facility] [phone]   [facility] [phone]   [facility]  [facility] [phone]   [facility] [phone]   [facility] [phone]   [facility] [phone]

## 2025-04-11 ENCOUNTER — TELEPHONE (OUTPATIENT)
Dept: CARDIAC SURGERY | Facility: CLINIC | Age: 25
End: 2025-04-11

## 2025-04-11 NOTE — TELEPHONE ENCOUNTER
Surgeon/Procedure/Date: Dr. Gibson, rib resection   Follow up appt: 4/14        Constipation: (Yes: Colace 100 mg BID, Senokot 2 tabs QHS or Senokot S 2 tabs qhs   No: Dulcolax/Miralax/suppository/enema)         2.   Pain Management: (Oxycodone 5-10 mg prn, Tylenol 650 mg q6 hrs and +/- Advil 600 mg TID for 7 days. Neurontin 300 mg TID for 21 days, or Neurontin 100 mg TID for patients over 70)        3.  Fever/Chills/Cough/shortness of breath:  (Call for temp >100.4 )      4. Incisions: (Warmth, redness, drainage? May shower, no baths. No creams, lotions, or ointments to incisions)      5.  Ambulation/Incentive Spirometry: (Any activity? Use IS every 15 min)

## 2025-04-11 NOTE — TELEPHONE ENCOUNTER
I called and spoke with the pt in regards to his post op appointment on 04/14/25. I informed the pt that he will need to get a CXR prior to this appointment. Pt verbalized understanding and was appreciative of the call.

## 2025-04-12 ENCOUNTER — HOSPITAL ENCOUNTER (OUTPATIENT)
Dept: RADIOLOGY | Facility: HOSPITAL | Age: 25
Discharge: HOME | End: 2025-04-12
Payer: COMMERCIAL

## 2025-04-12 DIAGNOSIS — G54.0 THORACIC OUTLET SYNDROME: ICD-10-CM

## 2025-04-12 PROCEDURE — 71046 X-RAY EXAM CHEST 2 VIEWS: CPT

## 2025-04-13 NOTE — PROGRESS NOTES
Name: Jay Ortiz      : 2000      MRN: [id number]  Encounter Provider: Dc Gibson DO  Encounter Date: 2025   Encounter department: [facility] [place]  :  Assessment & Plan  Thoracic outlet syndrome  24yM w/ bilateral TOS s/p Left robotic assisted first rib resection on 24 and Right robotic assisted first rib resection more recently    He is recovering well after surgery.   Ideally he get started up again with PT.   Will see him back in 2 months.            Thoracic History   Diagnosis: Bilateral TOS  Procedures/Surgeries: Left robotic first rib resection (24). Right robotic first rib resection (25)  Pathology: N/a  Adjuvant Therapy: N/a  Problem   Thoracic Outlet Syndrome        History of Present Illness   HPI  Jay Ortiz is a 24 y.o. male who presents for follow-up.     He underwent a left robotic first rib resection (24) with excellent results. This was followed by a right robotic first rib resection (25).     This is his first follow-up after his right-sided surgery.     He has some right sided soreness at the incisions. He has some numbness and soreness on the medial aspect of the right upper arm. No numbness elsewhere. Hand feels much improved from prior to surgery. Coordination and strength of the right hand is much improved. No temperature of color changes.     Review of Systems   Constitutional:  Negative for appetite change, chills, fatigue and fever.   HENT:  Negative for trouble swallowing and voice change.    Eyes:  Negative for photophobia and visual disturbance.   Respiratory:  Negative for cough, chest tightness and shortness of breath.    Cardiovascular:  Negative for chest pain, palpitations and leg swelling.   Gastrointestinal:  Negative for abdominal pain, nausea and vomiting.   Musculoskeletal:  Negative for back pain, gait problem and neck pain.   Skin:  Negative for rash and wound.   Neurological:         " Per HPI   All other systems reviewed and are negative.          Objective   /70 (BP Location: Left arm, Patient Position: Sitting, Cuff Size: Standard)   Pulse 70   Temp 97.9 °F (36.6 °C) (Temporal)   Resp 15   Ht 5' 6\" (1.676 m)   Wt 72.6 kg (160 lb 0.9 oz)   SpO2 97%   BMI 25.83 kg/m²     Pain Screening:  Pain Score:   2  ECOG    Physical Exam  Constitutional:       Appearance: Normal appearance.   HENT:      Head: Normocephalic and atraumatic.   Cardiovascular:      Rate and Rhythm: Normal rate and regular rhythm.      Heart sounds: Normal heart sounds. No murmur heard.  Pulmonary:      Effort: No respiratory distress.      Breath sounds: Normal breath sounds.   Abdominal:      General: Abdomen is flat. There is no distension.      Palpations: Abdomen is soft.      Tenderness: There is no abdominal tenderness.   Musculoskeletal:      Cervical back: Normal range of motion.      Right lower leg: No edema.      Left lower leg: No edema.   Lymphadenopathy:      Cervical: No cervical adenopathy.   Skin:     Comments: Well healed skin incisions   Neurological:      General: No focal deficit present.      Mental Status: He is alert and oriented to person, place, and time. Mental status is at baseline.   Psychiatric:         Mood and Affect: Mood normal.         Behavior: Behavior normal.         Thought Content: Thought content normal.         Judgment: Judgment normal.         Labs:       Pathology: I have reviewed pathology reports described above.      "

## 2025-04-14 ENCOUNTER — OFFICE VISIT (OUTPATIENT)
Dept: CARDIAC SURGERY | Facility: CLINIC | Age: 25
End: 2025-04-14

## 2025-04-14 VITALS
RESPIRATION RATE: 15 BRPM | WEIGHT: 160.05 LBS | BODY MASS INDEX: 25.72 KG/M2 | DIASTOLIC BLOOD PRESSURE: 70 MMHG | HEIGHT: 66 IN | OXYGEN SATURATION: 97 % | HEART RATE: 70 BPM | TEMPERATURE: 97.9 F | SYSTOLIC BLOOD PRESSURE: 100 MMHG

## 2025-04-14 DIAGNOSIS — G54.0 THORACIC OUTLET SYNDROME: Primary | ICD-10-CM

## 2025-04-14 PROCEDURE — 88311 DECALCIFY TISSUE: CPT | Performed by: PATHOLOGY

## 2025-04-14 PROCEDURE — 88307 TISSUE EXAM BY PATHOLOGIST: CPT | Performed by: PATHOLOGY

## 2025-04-14 PROCEDURE — 99024 POSTOP FOLLOW-UP VISIT: CPT | Performed by: SURGERY

## 2025-04-14 NOTE — LETTER
April 14, 2025     Patient: Jay Ortiz  YOB: 2000  Date of Visit: 4/14/2025      To Whom it May Concern:    Jay Ortiz is under my professional care. Jay was seen in my office on 4/14/2025. Jay may return to work on May 5, 2025 . When he returns he should be on light physical duty for another two weeks. I will be seeing him back in the office in 2 months to re-evaluate his activity level.     If you have any questions or concerns, please don't hesitate to call.         Sincerely,          Dc Gibson,         CC: No Recipients

## 2025-04-14 NOTE — ASSESSMENT & PLAN NOTE
24yM w/ bilateral TOS s/p Left robotic assisted first rib resection on 12/13/24 and Right robotic assisted first rib resection more recently    He is recovering well after surgery.   Ideally he get started up again with PT.   Will see him back in 2 months.      
General

## 2025-04-22 ENCOUNTER — TELEPHONE (OUTPATIENT)
Dept: CARDIAC SURGERY | Facility: CLINIC | Age: 25
End: 2025-04-22

## 2025-04-22 NOTE — TELEPHONE ENCOUNTER
I called and spoke with the pt's mother Michelle in regards to the pt's FMLA forms. I informed Michelle that the forms are completed and I have successfully faxed them over to the pt's employer today. She verbalized understanding and was appreciative of the call.

## 2025-04-23 ENCOUNTER — TELEPHONE (OUTPATIENT)
Age: 25
End: 2025-04-23

## 2025-04-23 ENCOUNTER — TELEPHONE (OUTPATIENT)
Dept: HEMATOLOGY ONCOLOGY | Facility: CLINIC | Age: 25
End: 2025-04-23

## 2025-04-23 NOTE — TELEPHONE ENCOUNTER
PT's Mother reached out and wanted the Completed [facility] [employer] to be mailed.  [facility] [employer] has now been mailed to the address on file.

## 2025-04-23 NOTE — TELEPHONE ENCOUNTER
Patient's mother Michelle calling in, she is requesting a copy of the completed [place] paperwork be mailed to her. Address confirmed as the one on file for Jay. Thank you!

## 2025-04-24 ENCOUNTER — OFFICE VISIT (OUTPATIENT)
Dept: FAMILY MEDICINE CLINIC | Facility: CLINIC | Age: 25
End: 2025-04-24
Payer: COMMERCIAL

## 2025-04-24 VITALS
TEMPERATURE: 98.7 F | HEIGHT: 66 IN | OXYGEN SATURATION: 97 % | HEART RATE: 89 BPM | RESPIRATION RATE: 12 BRPM | DIASTOLIC BLOOD PRESSURE: 70 MMHG | WEIGHT: 157 LBS | BODY MASS INDEX: 25.23 KG/M2 | SYSTOLIC BLOOD PRESSURE: 120 MMHG

## 2025-04-24 DIAGNOSIS — G89.29 CHRONIC BILATERAL LOW BACK PAIN WITHOUT SCIATICA: ICD-10-CM

## 2025-04-24 DIAGNOSIS — F41.8 ANXIETY ASSOCIATED WITH DEPRESSION: ICD-10-CM

## 2025-04-24 DIAGNOSIS — G54.0 THORACIC OUTLET SYNDROME: Primary | ICD-10-CM

## 2025-04-24 DIAGNOSIS — M48.07 SPINAL STENOSIS OF LUMBOSACRAL REGION: ICD-10-CM

## 2025-04-24 DIAGNOSIS — F90.2 ATTENTION DEFICIT HYPERACTIVITY DISORDER (ADHD), COMBINED TYPE: ICD-10-CM

## 2025-04-24 DIAGNOSIS — K58.1 IRRITABLE BOWEL SYNDROME WITH CONSTIPATION: ICD-10-CM

## 2025-04-24 DIAGNOSIS — M54.50 CHRONIC BILATERAL LOW BACK PAIN WITHOUT SCIATICA: ICD-10-CM

## 2025-04-24 PROCEDURE — 99214 OFFICE O/P EST MOD 30 MIN: CPT | Performed by: STUDENT IN AN ORGANIZED HEALTH CARE EDUCATION/TRAINING PROGRAM

## 2025-04-24 RX ORDER — DEXTROAMPHETAMINE SACCHARATE, AMPHETAMINE ASPARTATE MONOHYDRATE, DEXTROAMPHETAMINE SULFATE AND AMPHETAMINE SULFATE 3.75; 3.75; 3.75; 3.75 MG/1; MG/1; MG/1; MG/1
15 CAPSULE, EXTENDED RELEASE ORAL EVERY MORNING
Qty: 30 CAPSULE | Refills: 0 | Status: SHIPPED | OUTPATIENT
Start: 2025-04-24

## 2025-04-24 NOTE — PROGRESS NOTES
Name: Jay Ortiz      : 2000      MRN: [id number]  Encounter Provider: Low Zaidi DO  Encounter Date: 2025   Encounter department: [facility]  :  Assessment & Plan  Thoracic outlet syndrome  S/p bilateral surgical procedure with cardiothoracic surgery, patient is doing well with improvement, continue physical therapy, surgical incisions show no signs of infection, good approximation.       Attention deficit hyperactivity disorder (ADHD), combined type  Patient has been on Adderall before, recommend restarting, our goal is to come off of Zoloft with improvement  Orders:  •  amphetamine-dextroamphetamine (ADDERALL XR, 15MG,) 15 MG 24 hr capsule; Take 1 capsule (15 mg total) by mouth every morning Max Daily Amount: 15 mg    Anxiety associated with depression  Continue sertraline, trazodone       Spinal stenosis of lumbosacral region  Symptoms appear to be improving, MRI reviewed       Chronic bilateral low back pain without sciatica  Continue stretching/strengthening, physical therapy exercises       Irritable bowel syndrome with constipation  Appropriate hydration/nutrition reviewed, if symptoms persistent, GI evaluation              History of Present Illness   Follow up recent surgery.       Review of Systems   Constitutional:  Negative for chills and fever.   HENT:  Negative for ear pain and sore throat.    Eyes:  Negative for pain and visual disturbance.   Respiratory:  Negative for cough and shortness of breath.    Cardiovascular:  Negative for chest pain and palpitations.   Gastrointestinal:  Negative for abdominal pain and vomiting.   Genitourinary:  Negative for dysuria and hematuria.   Musculoskeletal:  Negative for arthralgias and back pain.   Skin:  Negative for color change and rash.   Neurological:  Negative for seizures and syncope.   Psychiatric/Behavioral:  Negative for agitation, behavioral problems and confusion.    All other systems reviewed and  "are negative.      Objective   /70 (BP Location: Left arm, Patient Position: Sitting, Cuff Size: Standard)   Pulse 89   Temp 98.7 °F (37.1 °C) (Temporal)   Resp 12   Ht 5' 6\" (1.676 m)   Wt 71.2 kg (157 lb)   SpO2 97%   BMI 25.34 kg/m²      Physical Exam  Vitals and nursing note reviewed.   Constitutional:       General: He is not in acute distress.     Appearance: He is well-developed.   HENT:      Head: Normocephalic and atraumatic.   Eyes:      General: No scleral icterus.     Conjunctiva/sclera: Conjunctivae normal.   Cardiovascular:      Rate and Rhythm: Normal rate and regular rhythm.      Pulses: Normal pulses.      Heart sounds: No murmur heard.  Pulmonary:      Effort: Pulmonary effort is normal. No respiratory distress.      Breath sounds: Normal breath sounds.   Abdominal:      General: Bowel sounds are normal. There is no distension.      Palpations: Abdomen is soft.      Tenderness: There is no abdominal tenderness.   Musculoskeletal:         General: No swelling. Normal range of motion.      Cervical back: Neck supple.   Skin:     General: Skin is warm and dry.      Capillary Refill: Capillary refill takes less than 2 seconds.   Neurological:      General: No focal deficit present.      Mental Status: He is alert and oriented to person, place, and time. Mental status is at baseline.   Psychiatric:         Mood and Affect: Mood normal.         Behavior: Behavior normal.         "

## 2025-04-24 NOTE — ASSESSMENT & PLAN NOTE
S/p bilateral surgical procedure with cardiothoracic surgery, patient is doing well with improvement, continue physical therapy, surgical incisions show no signs of infection, good approximation.

## 2025-04-24 NOTE — ASSESSMENT & PLAN NOTE
Patient has been on Adderall before, recommend restarting, our goal is to come off of Zoloft with improvement  Orders:  •  amphetamine-dextroamphetamine (ADDERALL XR, 15MG,) 15 MG 24 hr capsule; Take 1 capsule (15 mg total) by mouth every morning Max Daily Amount: 15 mg

## 2025-04-28 DIAGNOSIS — F41.8 ANXIETY ASSOCIATED WITH DEPRESSION: ICD-10-CM

## 2025-04-29 RX ORDER — TRAZODONE HYDROCHLORIDE 100 MG/1
100 TABLET ORAL
Qty: 90 TABLET | Refills: 0 | Status: SHIPPED | OUTPATIENT
Start: 2025-04-29 | End: 2025-07-28

## 2025-05-04 DIAGNOSIS — F41.8 ANXIETY ASSOCIATED WITH DEPRESSION: ICD-10-CM

## 2025-05-12 ENCOUNTER — TELEPHONE (OUTPATIENT)
Dept: CARDIAC SURGERY | Facility: CLINIC | Age: 25
End: 2025-05-12

## 2025-05-12 NOTE — TELEPHONE ENCOUNTER
Spoke with patient's mother Michelle and informed her that we are rescheduling his 2 month f/u appt with Dr. Gibson from 6/9/2025 to 6/19/2025 on PA's schedule. Michelle confirmed appt.

## 2025-05-13 ENCOUNTER — EVALUATION (OUTPATIENT)
Dept: PHYSICAL THERAPY | Facility: CLINIC | Age: 25
End: 2025-05-13

## 2025-05-13 DIAGNOSIS — G54.0 THORACIC OUTLET SYNDROME: ICD-10-CM

## 2025-05-13 DIAGNOSIS — Z48.89 AFTERCARE FOLLOWING SURGERY: ICD-10-CM

## 2025-05-13 DIAGNOSIS — R29.898 UPPER EXTREMITY WEAKNESS: Primary | ICD-10-CM

## 2025-05-13 PROCEDURE — 97161 PT EVAL LOW COMPLEX 20 MIN: CPT

## 2025-05-13 NOTE — PROGRESS NOTES
PT Evaluation     Today's date: 2025  Patient name: Jay Ortiz  : 2000  MRN: [id number]  Referring provider: Low Zaidi*  Dx:   Encounter Diagnosis     ICD-10-CM    1. Upper extremity weakness  R29.898       2. Aftercare following surgery  Z48.89       3. Thoracic outlet syndrome  G54.0           Start Time: 1100  Stop Time: 1145  Total time in clinic (min): 45 minutes    Assessment  Impairments: abnormal or restricted ROM, activity intolerance, impaired physical strength, lacks appropriate home exercise program, pain with function, poor posture , poor body mechanics, unable to perform ADL, sensory processing, participation limitations, activity limitations and endurance  Symptom irritability: low    Assessment details: Jay Ortiz is a 24 y.o. male presenting s/p R first rib resection, performed on 2025. Of note, he did undergo L first rib resection on 2024 with good outcomes. Following surgery, he presents with improvement in UE strength, temperature and light touch sensation bilaterally, reduced numbness/tingling, and coordination of his R hand. While he presents with significant improvements post-operatively, he does continue with relative UE strength deficits, activity limitations, increase soft tissue tension, impaired thoracic mobility, SOB with exertion, intermittent lightheadedness/dizziness mostly upon standing, decreased lung expansion, and postural dysfunction. Due to these impairments, pt is experiencing functional limitations including ADLs/IADLs, work-related activity, recreational activities, laying flat, performing overhead activity, sleeping, dressing, and lifting. Pt clinical presentation is consistent with referring diagnosis and post-operative status. Initiated home exercise program and educated on plan of care. Jay Ortiz would benefit from skilled physical therapy services to address aforementioned impairments, reduce pain, promote  return to prior level of function without limitation, and improve overall quality of life.     Understanding of Dx/Px/POC: good     Prognosis: good    Goals  STG 2-4 weeks  1. Patient will be independent with HEP.   2. Patient will demonstrate good seated and standing posture without cueing from PT.   3. Patient will demonstrate improvement in cervical ROM by at least 10% in deficient planes  4. Patient will demonstrate improvement of UE strength by 1/2 MMT grade    LTG 6-8 weeks  1. Patient will be able to return to regular physical and recreational activity without limitation  2. Patient will be able to return to regular work activity without limitation  3. Patient will be able to lift at least 10lbs overhead   4. Patient will be demonstrate UE strength to at least 4+/5   5. Patient will be able to perform all ADLs/IADLs without pain or limitation         Plan  Patient would benefit from: skilled physical therapy and PT eval  Planned modality interventions: cryotherapy, TENS and thermotherapy: hydrocollator packs    Planned therapy interventions: abdominal trunk stabilization, IASTM, joint mobilization, manual therapy, activity modification, body mechanics training, flexibility, home exercise program, therapeutic activities, therapeutic exercise, strengthening, stretching, postural training, patient/caregiver education, neuromuscular re-education, self care, nerve gliding and kinesiology taping    Frequency: 1x week  Duration in weeks: 8  Plan of Care beginning date: 5/13/2025  Plan of Care expiration date: 7/8/2025  Treatment plan discussed with: patient  Plan details: HEP development, stretching, strengthening, A/AA/PROM, joint mobilizations, posture education, STM/MI as needed to reduce muscle tension, muscle reeducation, PLOC discussed and agreed upon with patient.        Subjective Evaluation    History of Present Illness  Date of surgery: 12/13/2024 (L first rib resection), 4/4/2025 (R first rib resection)    Mechanism of injury: Jay Ortiz presents s/p R first rib resection performed on 2025. He reports the recovery for this procedure was a bit harder than his previous surgery, and he was in the hospital for about 3 days afterwards. He reports he was having some difficulty breathing and was frequently coughing, however this has seemed to improve. He reports he is set to return to work on Thursday. He reports improvement in his RUE numbness, however he does continue with numbness along the medial aspect of his arm. He reports he does notice improvement in his control of his R hand with drawing/artwork. He reports his L UE is doing well overall and he feels his range of motion and strength have continued to gradually improve. He reports continued intermittent random spots of numbness around his chest/thorax region, and often feels limited with R thoracic rotation. In addition, he reports his LE numbness has improved with frequent pelvic floor stretching. He reports overall he feels like a lot of his symptoms have been addressed and he has gotten many questions answered regarding his brain fog, chest fullness, and tooth/jaw pain.     Patient Goals  Patient goals for therapy: decreased pain, increased strength, independence with ADLs/IADLs and return to sport/leisure activities    Pain  Current pain ratin  At best pain ratin  At worst pain ratin  Quality: numbness,   Relieving factors: change in position  Aggravating factors: overhead activity/lifting overhead (laying flat on his back, physical activity, stress)    Social Support    Employment status: working ([employer] store)      Objective     Concurrent Complaints  Positive for dizziness (lightheadedness - intermittent mostly when transitioning to standing) and shortness of breath (often with activity, can be random). Negative for headaches    Static Posture     Head  Forward.    Shoulders  Rounded.    Scapulae  Left protracted and right  protracted.    Postural Observations  Seated posture: fair  Standing posture: fair  Correction of posture: has no consistent effect      Palpation   Left   No palpable tenderness to the upper trapezius.   Hypertonic in the pectoralis major, pectoralis minor and upper trapezius.   Tenderness of the pectoralis major and pectoralis minor.     Right   Hypertonic in the pectoralis minor and upper trapezius.   Tenderness of the pectoralis major, pectoralis minor and upper trapezius.     Neurological Testing     Sensation   Cervical/Thoracic   Left   Intact: hot/cold discrimination   Diminished: light touch    Right   Intact: hot/cold discrimination - improved after surgery  Diminished: light touch (medial RUE)       Reflexes   Left   Biceps (C5/C6): normal (2+)  Brachioradialis (C6): normal (2+)  Triceps (C7): normal (2+)    Right   Biceps (C5/C6): normal (2+)  Brachioradialis (C6): normal (2+)  Triceps (C7): normal (2+)    Active Range of Motion   Cervical/Thoracic Spine       Cervical    Flexion: 35 (tightness) degrees   Extension: 60 degrees      Left lateral flexion: 40 degrees      Right lateral flexion: 40 degrees      Left rotation: 70 degrees  Right rotation: 85 degrees       Thoracic    Flexion:  Restriction level: WFL  Extension:  Restriction level: moderate  Left rotation:  Restriction level: minimal  Right rotation:  Restriction level: moderate    Additional Active Range of Motion Details  Decreased lower cervical and upper thoracic ROM    Shoulder ROM  Left   Flexion: 180 degrees  Abduction: 170 degrees    Right   Flexion: 170 degrees  Abduction: 165 degrees     Joint Play     Hypomobile: C5, C6, C7, T1, T2, T3, T4, T5, T6, T7, T8, T9, T10, T11 and T12       Strength/Myotome Testing     Additional Strength Details  LUE : 60, 55, 50  RUE : 80, 75, 60   See table below for further strength assessments     Tests     Left Shoulder   Negative Adson maneuver and cervical rotation lateral flexion.     Right  Shoulder   Negative Adson maneuver and cervical rotation lateral flexion.     Neuro Exam:     Headaches   Patient reports headaches: No.         MMT  MMT 8/13 8/13 2/20/2025 (post op)  2/20/2025 5/13/2025 5/13/2025     Left Right Left Right Left  Right    Shoulder flex 4 4+ 4+ 4+ 4+ 4   Shoulder abd  4 4+ 4+ 4+ 4+ 4   Elbow flex 4 4+ 4 4+ 4+ 4   Elbow ext 3+ 4+ 4 4+ 4+ 4   Wrist flex 3+ 4+ 4 4 4 4   Wrist ext 3+ 4+ 4 4 4 4   Thumb abd  4 4+ 4 4 4+ 4            Precautions: frequent SOB and lightheadedness with laying flat          Insurance:  AMA/CMS Eval/ Re-eval Auth #/ Referral # Total units or visits Start date  Expiration date KX? Visit limitation?  PT only or  PT+OT? Co-Insurance   CMS 7/29/24 Not required        30% coinsurance     9/27           SELF PAY 2/11/2025 (post op)         1NMR   Self pay 5/13/2025             POC Start Date POC Expiration Date Signed POC?   7/29/2024 9/23/2024 pending   9/27/2024 11/22/2024 2/20/2025 4/17/2025 Pending    5/13/2025 7/8/2025 Pending      2025 Date 2/20 5/13             Visits/Units:  Used 1 2             Authed:  Remaining                    Access Code: [email or web address]  URL: [email or web address]  Date: 05/13/2025  Prepared by: Katarzyna Tristan    Exercises  - Sidelying Open Book Thoracic Lumbar Rotation and Extension  - 1 x daily - 7 x weekly - 2 sets - 10 reps  - Standing Shoulder W at Wall  - 1 x daily - 7 x weekly - 2 sets - 10 reps  - Seated Cat Cow  - 1 x daily - 7 x weekly - 2 sets - 10 reps    Date    5/13 2/20   Visit number     2 - IE (R post op)  1 - IE    Manuals        STM                                Neuro Re-Ed        Scap retraction         Nerve glides        Horizontal abd                                        Ther Ex        HEP    Initiated  Initiated    Education & management     POC, HEP POC, HEP   Cervical retraction        Thoracic extension        W's on wall                                 Ther Activity                         Gait Training                        Modalities

## 2025-05-22 ENCOUNTER — OFFICE VISIT (OUTPATIENT)
Dept: FAMILY MEDICINE CLINIC | Facility: CLINIC | Age: 25
End: 2025-05-22
Payer: COMMERCIAL

## 2025-05-22 VITALS
DIASTOLIC BLOOD PRESSURE: 72 MMHG | BODY MASS INDEX: 25.55 KG/M2 | TEMPERATURE: 96 F | OXYGEN SATURATION: 98 % | HEIGHT: 66 IN | HEART RATE: 65 BPM | WEIGHT: 159 LBS | RESPIRATION RATE: 12 BRPM | SYSTOLIC BLOOD PRESSURE: 140 MMHG

## 2025-05-22 DIAGNOSIS — G89.29 CHRONIC BILATERAL LOW BACK PAIN WITHOUT SCIATICA: ICD-10-CM

## 2025-05-22 DIAGNOSIS — G54.0 THORACIC OUTLET SYNDROME: Primary | ICD-10-CM

## 2025-05-22 DIAGNOSIS — M48.07 SPINAL STENOSIS OF LUMBOSACRAL REGION: ICD-10-CM

## 2025-05-22 DIAGNOSIS — F90.2 ATTENTION DEFICIT HYPERACTIVITY DISORDER (ADHD), COMBINED TYPE: ICD-10-CM

## 2025-05-22 DIAGNOSIS — K64.9 HEMORRHOIDS, UNSPECIFIED HEMORRHOID TYPE: ICD-10-CM

## 2025-05-22 DIAGNOSIS — F41.8 ANXIETY ASSOCIATED WITH DEPRESSION: ICD-10-CM

## 2025-05-22 DIAGNOSIS — M54.50 CHRONIC BILATERAL LOW BACK PAIN WITHOUT SCIATICA: ICD-10-CM

## 2025-05-22 DIAGNOSIS — K58.1 IRRITABLE BOWEL SYNDROME WITH CONSTIPATION: ICD-10-CM

## 2025-05-22 PROCEDURE — 99214 OFFICE O/P EST MOD 30 MIN: CPT | Performed by: STUDENT IN AN ORGANIZED HEALTH CARE EDUCATION/TRAINING PROGRAM

## 2025-05-22 RX ORDER — AMOXICILLIN 500 MG/1
TABLET, FILM COATED ORAL
COMMUNITY
Start: 2025-05-16

## 2025-05-22 RX ORDER — HYDROCORTISONE 25 MG/G
CREAM TOPICAL 2 TIMES DAILY
Qty: 28 G | Refills: 0 | Status: SHIPPED | OUTPATIENT
Start: 2025-05-22

## 2025-05-22 NOTE — PROGRESS NOTES
Name: Jay Ortiz      : 2000      MRN: [id number]  Encounter Provider: Low Zaidi DO  Encounter Date: 2025   Encounter department: [facility]  :  Assessment & Plan  Thoracic outlet syndrome  S/p surgical procedure bilaterally, following with physical therapy, healing appropriately       Chronic bilateral low back pain without sciatica  Following with physical therapy, continue light stretching/strengthening       Attention deficit hyperactivity disorder (ADHD), combined type  Patient is doing well on Adderall, continue at this time       Anxiety associated with depression  Switch when taking medication, patient will be taking Zoloft with dinner, trazodone 30 minutes prior to bedtime       Irritable bowel syndrome with constipation         Spinal stenosis of lumbosacral region         Hemorrhoids, unspecified hemorrhoid type  If symptoms do not improve with prescription strength hydrocortisone, evaluation with colorectal surgery plan.  Orders:  •  hydrocortisone (ANUSOL-HC) 2.5 % rectal cream; Apply topically 2 (two) times a day           History of Present Illness   Follow-up chronic disease management.      Review of Systems   Constitutional:  Negative for chills and fever.   HENT:  Negative for ear pain and sore throat.    Eyes:  Negative for pain and visual disturbance.   Respiratory:  Negative for cough and shortness of breath.    Cardiovascular:  Negative for chest pain and palpitations.   Gastrointestinal:  Negative for abdominal pain and vomiting.   Genitourinary:  Negative for dysuria and hematuria.   Musculoskeletal:  Positive for back pain. Negative for arthralgias.   Skin:  Negative for color change and rash.   Neurological:  Negative for seizures and syncope.   Psychiatric/Behavioral:  Negative for agitation, behavioral problems and confusion.    All other systems reviewed and are negative.      Objective   /72 (BP Location: Left arm, Patient  "Position: Sitting, Cuff Size: Standard)   Pulse 65   Temp (!) 96 °F (35.6 °C) (Temporal)   Resp 12   Ht 5' 6\" (1.676 m)   Wt 72.1 kg (159 lb)   SpO2 98%   BMI 25.66 kg/m²      Physical Exam  Vitals and nursing note reviewed.   Constitutional:       General: He is not in acute distress.     Appearance: He is well-developed.   HENT:      Head: Normocephalic and atraumatic.     Eyes:      General: No scleral icterus.     Conjunctiva/sclera: Conjunctivae normal.       Cardiovascular:      Rate and Rhythm: Normal rate and regular rhythm.      Pulses: Normal pulses.      Heart sounds: No murmur heard.  Pulmonary:      Effort: Pulmonary effort is normal. No respiratory distress.      Breath sounds: Normal breath sounds.   Abdominal:      General: There is no distension.      Palpations: Abdomen is soft.      Tenderness: There is no abdominal tenderness.     Musculoskeletal:         General: No swelling. Normal range of motion.      Cervical back: Neck supple.     Skin:     General: Skin is warm and dry.      Capillary Refill: Capillary refill takes less than 2 seconds.      Coloration: Skin is not jaundiced.     Neurological:      General: No focal deficit present.      Mental Status: He is alert and oriented to person, place, and time. Mental status is at baseline.     Psychiatric:         Mood and Affect: Mood normal.         Behavior: Behavior normal.         "

## 2025-05-22 NOTE — ASSESSMENT & PLAN NOTE
Switch when taking medication, patient will be taking Zoloft with dinner, trazodone 30 minutes prior to bedtime

## 2025-05-24 ENCOUNTER — APPOINTMENT (EMERGENCY)
Dept: RADIOLOGY | Facility: HOSPITAL | Age: 25
End: 2025-05-24
Payer: COMMERCIAL

## 2025-05-24 ENCOUNTER — HOSPITAL ENCOUNTER (EMERGENCY)
Facility: HOSPITAL | Age: 25
Discharge: HOME | End: 2025-05-24
Payer: COMMERCIAL

## 2025-05-24 VITALS
TEMPERATURE: 98.4 F | OXYGEN SATURATION: 97 % | HEART RATE: 82 BPM | SYSTOLIC BLOOD PRESSURE: 123 MMHG | DIASTOLIC BLOOD PRESSURE: 64 MMHG | RESPIRATION RATE: 18 BRPM

## 2025-05-24 DIAGNOSIS — R53.1 GENERALIZED WEAKNESS: Primary | ICD-10-CM

## 2025-05-24 DIAGNOSIS — J01.90 ACUTE SINUSITIS: ICD-10-CM

## 2025-05-24 DIAGNOSIS — R11.2 NAUSEA AND VOMITING: ICD-10-CM

## 2025-05-24 DIAGNOSIS — R55 NEAR SYNCOPE: ICD-10-CM

## 2025-05-24 LAB
ALBUMIN SERPL BCG-MCNC: 4.7 G/DL (ref 3.5–5)
ALP SERPL-CCNC: 58 U/L (ref 34–104)
ALT SERPL W P-5'-P-CCNC: 16 U/L (ref 7–52)
ANION GAP SERPL CALCULATED.3IONS-SCNC: 8 MMOL/L (ref 4–13)
APTT PPP: 29 SECONDS (ref 23–34)
AST SERPL W P-5'-P-CCNC: 15 U/L (ref 13–39)
ATRIAL RATE: 66 BPM
BASOPHILS # BLD AUTO: 0.03 THOUSANDS/ÂΜL (ref 0–0.1)
BASOPHILS NFR BLD AUTO: 0 % (ref 0–1)
BILIRUB SERPL-MCNC: 1.01 MG/DL (ref 0.2–1)
BILIRUB UR QL STRIP: NEGATIVE
BUN SERPL-MCNC: 18 MG/DL (ref 5–25)
CALCIUM SERPL-MCNC: 9.5 MG/DL (ref 8.4–10.2)
CHLORIDE SERPL-SCNC: 102 MMOL/L (ref 96–108)
CK SERPL-CCNC: 98 U/L (ref 39–308)
CLARITY UR: CLEAR
CO2 SERPL-SCNC: 27 MMOL/L (ref 21–32)
COLOR UR: COLORLESS
CREAT SERPL-MCNC: 0.82 MG/DL (ref 0.6–1.3)
EOSINOPHIL # BLD AUTO: 0.6 THOUSAND/ÂΜL (ref 0–0.61)
EOSINOPHIL NFR BLD AUTO: 7 % (ref 0–6)
ERYTHROCYTE [DISTWIDTH] IN BLOOD BY AUTOMATED COUNT: 12.2 % (ref 11.6–15.1)
GFR SERPL CREATININE-BSD FRML MDRD: 124 ML/MIN/1.73SQ M
GLUCOSE SERPL-MCNC: 87 MG/DL (ref 65–140)
GLUCOSE UR STRIP-MCNC: NEGATIVE MG/DL
HCT VFR BLD AUTO: 42.8 % (ref 36.5–49.3)
HGB BLD-MCNC: 14.9 G/DL (ref 12–17)
HGB UR QL STRIP.AUTO: NEGATIVE
IMM GRANULOCYTES # BLD AUTO: 0.03 THOUSAND/UL (ref 0–0.2)
IMM GRANULOCYTES NFR BLD AUTO: 0 % (ref 0–2)
INR PPP: 0.93 (ref 0.85–1.19)
KETONES UR STRIP-MCNC: NEGATIVE MG/DL
LEUKOCYTE ESTERASE UR QL STRIP: NEGATIVE
LIPASE SERPL-CCNC: 7 U/L (ref 11–82)
LYMPHOCYTES # BLD AUTO: 1.97 THOUSANDS/ÂΜL (ref 0.6–4.47)
LYMPHOCYTES NFR BLD AUTO: 24 % (ref 14–44)
MAGNESIUM SERPL-MCNC: 2.1 MG/DL (ref 1.9–2.7)
MCH RBC QN AUTO: 32.6 PG (ref 26.8–34.3)
MCHC RBC AUTO-ENTMCNC: 34.8 G/DL (ref 31.4–37.4)
MCV RBC AUTO: 94 FL (ref 82–98)
MONOCYTES # BLD AUTO: 0.45 THOUSAND/ÂΜL (ref 0.17–1.22)
MONOCYTES NFR BLD AUTO: 5 % (ref 4–12)
NEUTROPHILS # BLD AUTO: 5.25 THOUSANDS/ÂΜL (ref 1.85–7.62)
NEUTS SEG NFR BLD AUTO: 64 % (ref 43–75)
NITRITE UR QL STRIP: NEGATIVE
NRBC BLD AUTO-RTO: 0 /100 WBCS
P AXIS: 56 DEGREES
PH UR STRIP.AUTO: 7.5 [PH]
PHOSPHATE SERPL-MCNC: 2.9 MG/DL (ref 2.7–4.5)
PLATELET # BLD AUTO: 276 THOUSANDS/UL (ref 149–390)
PMV BLD AUTO: 9 FL (ref 8.9–12.7)
POTASSIUM SERPL-SCNC: 3.9 MMOL/L (ref 3.5–5.3)
PR INTERVAL: 150 MS
PROT SERPL-MCNC: 6.9 G/DL (ref 6.4–8.4)
PROT UR STRIP-MCNC: NEGATIVE MG/DL
PROTHROMBIN TIME: 13 SECONDS (ref 12.3–15)
QRS AXIS: 77 DEGREES
QRSD INTERVAL: 90 MS
QT INTERVAL: 370 MS
QTC INTERVAL: 387 MS
RBC # BLD AUTO: 4.57 MILLION/UL (ref 3.88–5.62)
SODIUM SERPL-SCNC: 137 MMOL/L (ref 135–147)
SP GR UR STRIP.AUTO: 1.01 (ref 1–1.03)
T WAVE AXIS: 48 DEGREES
TSH SERPL DL<=0.05 MIU/L-ACNC: 0.54 UIU/ML (ref 0.45–4.5)
UROBILINOGEN UR STRIP-ACNC: <2 MG/DL
VENTRICULAR RATE: 66 BPM
WBC # BLD AUTO: 8.33 THOUSAND/UL (ref 4.31–10.16)

## 2025-05-24 PROCEDURE — 36415 COLL VENOUS BLD VENIPUNCTURE: CPT

## 2025-05-24 PROCEDURE — 99285 EMERGENCY DEPT VISIT HI MDM: CPT

## 2025-05-24 PROCEDURE — 81003 URINALYSIS AUTO W/O SCOPE: CPT

## 2025-05-24 PROCEDURE — 83690 ASSAY OF LIPASE: CPT

## 2025-05-24 PROCEDURE — 71260 CT THORAX DX C+: CPT

## 2025-05-24 PROCEDURE — 74177 CT ABD & PELVIS W/CONTRAST: CPT

## 2025-05-24 PROCEDURE — 84100 ASSAY OF PHOSPHORUS: CPT

## 2025-05-24 PROCEDURE — 82550 ASSAY OF CK (CPK): CPT

## 2025-05-24 PROCEDURE — 84443 ASSAY THYROID STIM HORMONE: CPT

## 2025-05-24 PROCEDURE — 85025 COMPLETE CBC W/AUTO DIFF WBC: CPT

## 2025-05-24 PROCEDURE — 80053 COMPREHEN METABOLIC PANEL: CPT

## 2025-05-24 PROCEDURE — 85730 THROMBOPLASTIN TIME PARTIAL: CPT

## 2025-05-24 PROCEDURE — 85610 PROTHROMBIN TIME: CPT

## 2025-05-24 PROCEDURE — 96374 THER/PROPH/DIAG INJ IV PUSH: CPT

## 2025-05-24 PROCEDURE — 96361 HYDRATE IV INFUSION ADD-ON: CPT

## 2025-05-24 PROCEDURE — 93005 ELECTROCARDIOGRAM TRACING: CPT

## 2025-05-24 PROCEDURE — 83735 ASSAY OF MAGNESIUM: CPT

## 2025-05-24 PROCEDURE — 70450 CT HEAD/BRAIN W/O DYE: CPT

## 2025-05-24 RX ORDER — FLUTICASONE PROPIONATE 50 MCG
1 SPRAY, SUSPENSION (ML) NASAL DAILY
Qty: 16 G | Refills: 0 | Status: SHIPPED | OUTPATIENT
Start: 2025-05-24 | End: 2025-06-06

## 2025-05-24 RX ORDER — METOCLOPRAMIDE 10 MG/1
10 TABLET ORAL EVERY 6 HOURS
Qty: 30 TABLET | Refills: 0 | Status: SHIPPED | OUTPATIENT
Start: 2025-05-24 | End: 2025-06-06

## 2025-05-24 RX ORDER — DROPERIDOL 2.5 MG/ML
0.62 INJECTION, SOLUTION INTRAMUSCULAR; INTRAVENOUS ONCE
Status: COMPLETED | OUTPATIENT
Start: 2025-05-24 | End: 2025-05-24

## 2025-05-24 RX ADMIN — SODIUM CHLORIDE 1000 ML: 0.9 INJECTION, SOLUTION INTRAVENOUS at 19:16

## 2025-05-24 RX ADMIN — IOHEXOL 100 ML: 350 INJECTION, SOLUTION INTRAVENOUS at 20:43

## 2025-05-24 RX ADMIN — DROPERIDOL 0.62 MG: 2.5 INJECTION, SOLUTION INTRAMUSCULAR; INTRAVENOUS at 19:24

## 2025-05-24 NOTE — ED PROVIDER NOTES
Time reflects when diagnosis was documented in both MDM as applicable and the Disposition within this note       Time User Action Codes Description Comment    5/24/2025 10:00 PM Ty Carreon [R53.1] Generalized weakness     5/24/2025 10:01 PM Ty Carreon [R11.2] Nausea and vomiting     5/24/2025 10:03 PM Ty Carreon [R55] Near syncope     5/24/2025 10:03 PM Ty Carreon [J01.90] Acute sinusitis           ED Disposition       ED Disposition   Discharge    Condition   Stable    Date/Time   Sat May 24, 2025 10:00 PM    Comment   Jay Ortiz discharge to home/self care.                   Assessment & Plan       Medical Decision Making  Amount and/or Complexity of Data Reviewed  Labs: ordered.  Radiology: ordered.    Risk  Prescription drug management.        25 y/o M presents via EMS with multiple somatic complaints including cognitive dysfunction, leg weakness, not tolerating PO x1 month, lump in chest that if palpated causes him to pass out, urinary incontinence.  Patient also has a spot behind his left leg that if palpated also makes him feel he is going to pass out.  Tonight felt like his leg weakness was worse bilaterally, reports he cannot move his legs.  He also reports not eating for over a month, whenever he eats he vomits.  Also reports vomiting and coughing blood as well as having blood in stool.  He believes he has a hemorrhoid that he is trying to treat.  He reports worsening cognitive dysfunction including forgetfulness, cites forgetting his mother and girlfriend's names. Pt feels symptoms progressively worsened.  Feels like his symptoms have worsened since his thoracic outlet surgery last month.  Vital signs are stable  Patient has motor tone in bilateral lower extremities however refuses to lift legs.  When I lift the legs he is CLEARtone however drops his legs to the bed.  There are no obvious sensory changes.  Patient's chest wounds from thoracic outlet surgery are  well-healing.  EKG has no sign of arrhythmia or ischemia.  Overall suspect the symptoms are somatic in nature, labs do not reveal any sign of dehydration or electrolyte disturbance that I would suspect in the setting of dehydration or starvation.  CT imaging of the head, chest, abdomen did not reveal a source of patient's complaints.  I discussed with patient and the mother finding of possible sinusitis, prescription sent for Flonase to trial.  UA negative for infection.  Patient updated to all lab and imaging findings.  On reassessment, patient states that he feels better.  Offered admission for physical therapy and possible rehab placement for his reported leg weakness however patient feels he is able to go home and follow-up with his outpatient team.  Patient stable for discharge at this time.  Patient's mother at bedside and agreeable to plan.         Medications   droperidol (INAPSINE) injection 0.625 mg (0.625 mg Intravenous Given 5/24/25 1924)   sodium chloride 0.9 % bolus 1,000 mL (0 mL Intravenous Stopped 5/24/25 2016)   iohexol (OMNIPAQUE) 350 MG/ML injection (MULTI-DOSE) 100 mL (100 mL Intravenous Given 5/24/25 2043)       ED Risk Strat Scores                    No data recorded        SBIRT 20yo+      Flowsheet Row Most Recent Value   Initial Alcohol Screen: US AUDIT-C     1. How often do you have a drink containing alcohol? 0 Filed at: 05/24/2025 1836   2. How many drinks containing alcohol do you have on a typical day you are drinking?  0 Filed at: 05/24/2025 1836   3a. Male UNDER 65: How often do you have five or more drinks on one occasion? 0 Filed at: 05/24/2025 1836   Audit-C Score 0 Filed at: 05/24/2025 1836   USMAN: How many times in the past year have you...    Used an illegal drug or used a prescription medication for non-medical reasons? Never Filed at: 05/24/2025 1836                            History of Present Illness       Chief Complaint   Patient presents with    Weakness - Generalized      Pt presents with multiple complaints, acute on chronic leg weakness/numbness, GI issues.       Past Medical History[1]   Past Surgical History[2]   Family History[3]   Social History[4]   E-Cigarette/Vaping    E-Cigarette Use Never User       E-Cigarette/Vaping Substances    Nicotine No     THC No     CBD No     Flavoring No     Other No     Unknown No       I have reviewed and agree with the history as documented.     HPI  See MDM  Review of Systems   Gastrointestinal:  Positive for blood in stool, nausea and vomiting.   Neurological:  Positive for weakness.   All other systems reviewed and are negative.          Objective       ED Triage Vitals [05/24/25 1835]   Temperature Pulse Blood Pressure Respirations SpO2 Patient Position - Orthostatic VS   98.4 °F (36.9 °C) 75 123/68 18 99 % Lying      Temp Source Heart Rate Source BP Location FiO2 (%) Pain Score    Oral Monitor Left arm -- --      Vitals      Date and Time Temp Pulse SpO2 Resp BP Pain Score FACES Pain Rating User   05/24/25 2223 -- 82 97 % -- 123/64 -- -- JG   05/24/25 2134 -- 86 98 % 18 128/71 -- -- JG   05/24/25 1835 98.4 °F (36.9 °C) 75 99 % 18 123/68 -- -- BG            Physical Exam  Vitals and nursing note reviewed.   Constitutional:       General: He is not in acute distress.     Appearance: He is well-developed. He is not toxic-appearing.   HENT:      Head: Normocephalic and atraumatic.      Right Ear: External ear normal.      Left Ear: External ear normal.      Nose: Nose normal.      Mouth/Throat:      Pharynx: Oropharynx is clear. No oropharyngeal exudate or posterior oropharyngeal erythema.     Eyes:      Extraocular Movements: Extraocular movements intact.      Conjunctiva/sclera: Conjunctivae normal.      Pupils: Pupils are equal, round, and reactive to light.       Cardiovascular:      Rate and Rhythm: Normal rate and regular rhythm.      Pulses: Normal pulses.      Heart sounds: Normal heart sounds. No murmur heard.     No friction  rub. No gallop.   Pulmonary:      Effort: Pulmonary effort is normal. No respiratory distress.      Breath sounds: Normal breath sounds. No wheezing, rhonchi or rales.   Abdominal:      General: Abdomen is flat.      Palpations: Abdomen is soft.      Tenderness: There is no abdominal tenderness. There is no guarding or rebound.     Musculoskeletal:         General: No deformity.      Cervical back: Normal range of motion. No rigidity.      Right lower leg: No edema.      Left lower leg: No edema.     Skin:     General: Skin is warm and dry.      Capillary Refill: Capillary refill takes less than 2 seconds.     Neurological:      Mental Status: He is alert.     Psychiatric:         Attention and Perception: Attention normal.         Mood and Affect: Mood normal.         Speech: Speech is rapid and pressured.         Behavior: Behavior is cooperative.         Results Reviewed       Procedure Component Value Units Date/Time    UA (URINE) with reflex to Scope [[id number]] Collected: 05/24/25 2153    Lab Status: Final result Specimen: Urine, Clean Catch Updated: 05/24/25 2158     Color, UA Colorless     Clarity, UA Clear     Specific Gravity, UA 1.010     pH, UA 7.5     Leukocytes, UA Negative     Nitrite, UA Negative     Protein, UA Negative mg/dl      Glucose, UA Negative mg/dl      Ketones, UA Negative mg/dl      Urobilinogen, UA <2.0 mg/dl      Bilirubin, UA Negative     Occult Blood, UA Negative    TSH, 3rd generation with Free T4 reflex [[id number]]  (Normal) Collected: 05/24/25 1911    Lab Status: Final result Specimen: Blood from Arm, Right Updated: 05/24/25 1954     TSH 3RD GENERATON 0.537 uIU/mL     Protime-INR [[id number]]  (Normal) Collected: 05/24/25 1911    Lab Status: Final result Specimen: Blood from Arm, Right Updated: 05/24/25 1945     Protime 13.0 seconds      INR 0.93    Narrative:      INR Therapeutic Range    Indication                                             INR Range      Atrial Fibrillation                                                2.0-3.0  Hypercoagulable State                                    2.0.2.3  Left Ventricular Asist Device                            2.0-3.0  Mechanical Heart Valve                                  -    Aortic(with afib, MI, embolism, HF, LA enlargement,    and/or coagulopathy)                                     2.0-3.0 (2.5-3.5)     Mitral                                                             2.5-3.5  Prosthetic/Bioprosthetic Heart Valve               2.0-3.0  Venous thromboembolism (VTE: VT, PE        2.0-3.0    APTT [[id number]]  (Normal) Collected: 05/24/25 1911    Lab Status: Final result Specimen: Blood from Arm, Right Updated: 05/24/25 1945     PTT 29 seconds     Comprehensive metabolic panel [[id number]]  (Abnormal) Collected: 05/24/25 1911    Lab Status: Final result Specimen: Blood from Arm, Right Updated: 05/24/25 1940     Sodium 137 mmol/L      Potassium 3.9 mmol/L      Chloride 102 mmol/L      CO2 27 mmol/L      ANION GAP 8 mmol/L      BUN 18 mg/dL      Creatinine 0.82 mg/dL      Glucose 87 mg/dL      Calcium 9.5 mg/dL      AST 15 U/L      ALT 16 U/L      Alkaline Phosphatase 58 U/L      Total Protein 6.9 g/dL      Albumin 4.7 g/dL      Total Bilirubin 1.01 mg/dL      eGFR 124 ml/min/1.73sq m     Narrative:      National Kid[employer] Disease Foundation guidelines for Chronic Kidney Disease (CKD):     Stage 1 with normal or high GFR (GFR > 90 mL/min/1.73 square meters)    Stage 2 Mild CKD (GFR = 60-89 mL/min/1.73 square meters)    Stage 3A Moderate CKD (GFR = 45-59 mL/min/1.73 square meters)    Stage 3B Moderate CKD (GFR = 30-44 mL/min/1.73 square meters)    Stage 4 Severe CKD (GFR = 15-29 mL/min/1.73 square meters)    Stage 5 End Stage CKD (GFR <15 mL/min/1.73 square meters)  Note: GFR calculation is accurate only with a steady state creatinine    Magnesium [[id number]]  (Normal) Collected: 05/24/25 1911    Lab Status: Final result Specimen: Blood from Arm, Right  Updated: 05/24/25 1940     Magnesium 2.1 mg/dL     Phosphorus [[id number]]  (Normal) Collected: 05/24/25 1911    Lab Status: Final result Specimen: Blood from Arm, Right Updated: 05/24/25 1940     Phosphorus 2.9 mg/dL     Lipase [[id number]]  (Abnormal) Collected: 05/24/25 1911    Lab Status: Final result Specimen: Blood from Arm, Right Updated: 05/24/25 1940     Lipase 7 u/L     CK [[id number]]  (Normal) Collected: 05/24/25 1911    Lab Status: Final result Specimen: Blood from Arm, Right Updated: 05/24/25 1940     Total CK 98 U/L     CBC and differential [[id number]]  (Abnormal) Collected: 05/24/25 1911    Lab Status: Final result Specimen: Blood from Arm, Right Updated: 05/24/25 1921     WBC 8.33 Thousand/uL      RBC 4.57 Million/uL      Hemoglobin 14.9 g/dL      Hematocrit 42.8 %      MCV 94 fL      MCH 32.6 pg      MCHC 34.8 g/dL      RDW 12.2 %      MPV 9.0 fL      Platelets 276 Thousands/uL      nRBC 0 /100 WBCs      Segmented % 64 %      Immature Grans % 0 %      Lymphocytes % 24 %      Monocytes % 5 %      Eosinophils Relative 7 %      Basophils Relative 0 %      Absolute Neutrophils 5.25 Thousands/µL      Absolute Immature Grans 0.03 Thousand/uL      Absolute Lymphocytes 1.97 Thousands/µL      Absolute Monocytes 0.45 Thousand/µL      Eosinophils Absolute 0.60 Thousand/µL      Basophils Absolute 0.03 Thousands/µL             CT chest abdomen pelvis w contrast   Final Interpretation by Darian Callejas MD (05/24 2136)      Clear lungs.      Mild bladder wall thickening. Correlate for possible cystitis.                  Workstation performed: [id number]         CT head without contrast   Final Interpretation by Darian Callejas MD (05/24 2132)      No acute intracranial abnormality.      Bilateral maxillary sinus air-fluid levels consistent with acute sinusitis.                  Workstation performed: [id number]             Procedures    ED Medication and Procedure Management   Prior to Admission Medications    Prescriptions Last Dose Informant Patient Reported? Taking?   amoxicillin (AMOXIL) 500 MG tablet  Self Yes No   amphetamine-dextroamphetamine (ADDERALL XR, 15MG,) 15 MG 24 hr capsule  Self No No   Sig: Take 1 capsule (15 mg total) by mouth every morning Max Daily Amount: 15 mg   hydrocortisone (ANUSOL-HC) 2.5 % rectal cream   No No   Sig: Apply topically 2 (two) times a day   sertraline (ZOLOFT) 50 mg tablet  Self No No   Sig: Take 1 tablet (50 mg total) by mouth in the morning   traZODone (DESYREL) 100 mg tablet  Self No No   Sig: Take 1 tablet (100 mg total) by mouth daily at bedtime      Facility-Administered Medications: None     Discharge Medication List as of 5/24/2025 10:03 PM        START taking these medications    Details   fluticasone (FLONASE) 50 mcg/act nasal spray 1 spray into each nostril daily, Starting Sat 5/24/2025, Normal      metoclopramide (Reglan) 10 mg tablet Take 1 tablet (10 mg total) by mouth every 6 (six) hours, Starting Sat 5/24/2025, Normal           CONTINUE these medications which have NOT CHANGED    Details   amoxicillin (AMOXIL) 500 MG tablet Historical Med      amphetamine-dextroamphetamine (ADDERALL XR, 15MG,) 15 MG 24 hr capsule Take 1 capsule (15 mg total) by mouth every morning Max Daily Amount: 15 mg, Starting Thu 4/24/2025, Normal      hydrocortisone (ANUSOL-HC) 2.5 % rectal cream Apply topically 2 (two) times a day, Starting Thu 5/22/2025, Normal      sertraline (ZOLOFT) 50 mg tablet Take 1 tablet (50 mg total) by mouth in the morning, Starting Mon 5/5/2025, Until Sun 8/3/2025, Normal      traZODone (DESYREL) 100 mg tablet Take 1 tablet (100 mg total) by mouth daily at bedtime, Starting Tue 4/29/2025, Until Mon 7/28/2025, Normal           No discharge procedures on file.  ED SEPSIS DOCUMENTATION   Time reflects when diagnosis was documented in both MDM as applicable and the Disposition within this note       Time User Action Codes Description Comment    5/24/2025 10:00 PM  Ty Carreon Add [R53.1] Generalized weakness     5/24/2025 10:01 PM Ty Carreon Add [R11.2] Nausea and vomiting     5/24/2025 10:03 PM Ty Carreon Add [R55] Near syncope     5/24/2025 10:03 PM Ty Carreon Add [J01.90] Acute sinusitis                      [1]   Past Medical History:  Diagnosis Date    ADHD     ADHD (attention deficit hyperactivity disorder)     Anxiety     Anxiety     Depression     Depression    [2]   Past Surgical History:  Procedure Laterality Date    ADENOIDECTOMY      PA BRNCHSC INCL FLUOR GDNCE DX W/CELL WASHG SPX N/A 12/13/2024    Procedure: BRONCHOSCOPY FLEXIBLE;  Surgeon: Dc Gibson DO;  Location: [facility];  Service: Thoracic    RESECTION RIB, THORACOSCOPIC WITH ROBOTICS Left 12/13/2024    Procedure: ROBOTIC LEFT FIRST RIB RESECTION;  Surgeon: Dc Gibson DO;  Location: [facility];  Service: Thoracic    RESECTION RIB, THORACOSCOPIC WITH ROBOTICS Right 4/4/2025    Procedure: RESECTION RIB,THORACOSCOPIC WITH ROBOTICS;  Surgeon: Dc Gibson DO;  Location: [facility];  Service: Thoracic    TONSILLECTOMY      TYMPANOSTOMY TUBE PLACEMENT      removed    WISDOM TOOTH EXTRACTION     [3]   Family History  Problem Relation Name Age of Onset    No Known Problems Mother      Diabetes Father     [4]   Social History  Tobacco Use    Smoking status: Never    Smokeless tobacco: Never   Vaping Use    Vaping status: Never Used   Substance Use Topics    Alcohol use: Not Currently     Comment: Rarely    Drug use: Yes     Frequency: 7.0 times per week     Types: Marijuana     Comment: Medical Marijuana        Ty Carreon MD  05/24/25 0635

## 2025-05-25 NOTE — DISCHARGE INSTRUCTIONS
Continue to follow up with your outpatient team for further evaluation and management of your symptoms.     If you develop new or worsening symptoms, please return to the Emergency Department for further evaluation.

## 2025-05-25 NOTE — ED NOTES
Pt visualized raising and lowering legs in bed without issue or complication.  Pt previously reported in neuro exam that he was not able to move legs.     Bakari Templeton RN  05/24/25 4780

## 2025-05-27 ENCOUNTER — OFFICE VISIT (OUTPATIENT)
Dept: PHYSICAL THERAPY | Facility: CLINIC | Age: 25
End: 2025-05-27
Payer: COMMERCIAL

## 2025-05-27 DIAGNOSIS — R29.898 UPPER EXTREMITY WEAKNESS: Primary | ICD-10-CM

## 2025-05-27 DIAGNOSIS — G54.0 THORACIC OUTLET SYNDROME: ICD-10-CM

## 2025-05-27 DIAGNOSIS — Z48.89 AFTERCARE FOLLOWING SURGERY: ICD-10-CM

## 2025-05-27 LAB
ATRIAL RATE: 66 BPM
P AXIS: 56 DEGREES
PR INTERVAL: 150 MS
QRS AXIS: 77 DEGREES
QRSD INTERVAL: 90 MS
QT INTERVAL: 370 MS
QTC INTERVAL: 387 MS
T WAVE AXIS: 48 DEGREES
VENTRICULAR RATE: 66 BPM

## 2025-05-27 PROCEDURE — 97110 THERAPEUTIC EXERCISES: CPT

## 2025-05-27 PROCEDURE — 93010 ELECTROCARDIOGRAM REPORT: CPT | Performed by: INTERNAL MEDICINE

## 2025-05-27 NOTE — PROGRESS NOTES
Daily Note     Today's date: 2025  Patient name: Jay Ortiz  : 2000  MRN: [id number]  Referring provider: Dc Gibson*  Dx:   Encounter Diagnosis     ICD-10-CM    1. Upper extremity weakness  R29.898       2. Aftercare following surgery  Z48.89       3. Thoracic outlet syndrome  G54.0           Start Time: 1100  Stop Time: 1145  Total time in clinic (min): 45 minutes    Subjective: Pt reports he went to the ED on  following an episode of potential syncope, bilateral LE weakness, bilateral LE numbness, and headache/nausea. He reports his diagnosis was inconclusive, however he did have a chest and head CT that were both unremarkable. He reports he has been having increasing difficulty breathing, recurrent sinus infections, head pressure, and intermittent recurrence of bilateral LE numbness. He reports he has not seen a neurologist recently.      Objective: See treatment diary below      Assessment: Referred pt to neurologist given his continuous recurrent of bilateral LE numbness and weakness, head pressure, and vertigo. Discussed potential benefit of seeking out a functional medicine practitioner for more holistic, full body approach with pt in agreement. He did well with seated exercises today and demonstrated improvement in thoracic mobility overall.        Plan: Continue per plan of care.  Progress treatment as tolerated.  Referral to neurologist.      Precautions: frequent SOB and lightheadedness with laying flat          Insurance:  AMA/CMS Eval/ Re-eval Auth #/ Referral # Total units or visits Start date  Expiration date KX? Visit limitation?  PT only or  PT+OT? Co-Insurance   CMS 24 Not required        30% coinsurance                SELF PAY 2025 (post op)         1NMR   CMS 2025        30% coins after ded      POC Start Date POC Expiration Date Signed POC?   2024 pending   2024 Pending    2025  7/8/2025 Pending      2025 Date 2/20 5/13 5/27            Visits/Units:  Used 1 2 3            Authed:  Remaining                    Access Code: [email or web address]  URL: [email or web address]  Date: 05/13/2025  Prepared by: Katarzyna Tristan    Exercises  - Sidelying Open Book Thoracic Lumbar Rotation and Extension  - 1 x daily - 7 x weekly - 2 sets - 10 reps  - Standing Shoulder W at Wall  - 1 x daily - 7 x weekly - 2 sets - 10 reps  - Seated Cat Cow  - 1 x daily - 7 x weekly - 2 sets - 10 reps    Date   5/27 5/13 2/20   Visit number    3 2 - IE (R post op)  1 - IE    Manuals        STM                                Neuro Re-Ed        Scap retraction         Nerve glides        Horizontal abd   Seated RTB 2x10                                     Ther Ex        HEP    Initiated  Initiated    Education & management    Neurology referral  POC, HEP POC, HEP   Cervical retraction        Thoracic extension        W's on wall         Thoracic rotation   2x10 seated      Seated cat cow   2x10             Ther Activity                        Gait Training                        Modalities

## 2025-05-28 ENCOUNTER — TELEPHONE (OUTPATIENT)
Age: 25
End: 2025-05-28

## 2025-05-28 NOTE — TELEPHONE ENCOUNTER
Patient calling stating his Physical therapist believes his phrenic nerve is possibly damaged from his surgery and that is why his symptoms are worsening (patient was seen in EMERGENCY DEPARTMENT on 5/24, declined follow up visit as he was just in on 5/22).  He was advised to get further imaging - Patient is asking Dr. Zaidi what next steps would be, please review and contact patient

## 2025-05-29 NOTE — TELEPHONE ENCOUNTER
Left message to return call. Per Dr Zaidi- patient needs to follow up with specialist who completed recent surgery.

## 2025-05-30 ENCOUNTER — TELEPHONE (OUTPATIENT)
Age: 25
End: 2025-05-30

## 2025-05-30 NOTE — TELEPHONE ENCOUNTER
Patient would like a call back due to his hospital visit because he stated he was kicked out of the hospital. Please advise. Thank you.

## 2025-06-03 ENCOUNTER — OFFICE VISIT (OUTPATIENT)
Dept: PHYSICAL THERAPY | Facility: CLINIC | Age: 25
End: 2025-06-03
Payer: COMMERCIAL

## 2025-06-03 DIAGNOSIS — F41.8 ANXIETY ASSOCIATED WITH DEPRESSION: ICD-10-CM

## 2025-06-03 DIAGNOSIS — G54.0 THORACIC OUTLET SYNDROME: ICD-10-CM

## 2025-06-03 DIAGNOSIS — R29.898 UPPER EXTREMITY WEAKNESS: Primary | ICD-10-CM

## 2025-06-03 DIAGNOSIS — F90.2 ATTENTION DEFICIT HYPERACTIVITY DISORDER (ADHD), COMBINED TYPE: ICD-10-CM

## 2025-06-03 DIAGNOSIS — Z48.89 AFTERCARE FOLLOWING SURGERY: ICD-10-CM

## 2025-06-03 PROCEDURE — 97110 THERAPEUTIC EXERCISES: CPT

## 2025-06-03 PROCEDURE — 97112 NEUROMUSCULAR REEDUCATION: CPT

## 2025-06-03 NOTE — PROGRESS NOTES
Daily Note     Today's date: 6/3/2025  Patient name: Jay Ortiz  : 2000  MRN: [id number]  Referring provider: cD Gibson*  Dx:   Encounter Diagnosis     ICD-10-CM    1. Upper extremity weakness  R29.898       2. Aftercare following surgery  Z48.89       3. Thoracic outlet syndrome  G54.0           Start Time: 1100  Stop Time: 1145  Total time in clinic (min): 45 minutes    Subjective: Patient reports he returned to the ED on 2025 due to repeated syncope/seizure type activity, witnessed by his girlfriend. He reports he does not have any memory of these episodes. He reports he was in the hospital for about 16 hours prior to leaving [facility] due to feeling like his medical team was not listening or being attentive to him. He reports he has continued to experience bilateral LE numbness intermittently, but does not notice a pattern to when this occurs. He reports that otherwise, he has been doing well with his home exercises and feels like his mobility is improving a bit. Additionally, he reports he did contact the functional neurologist and made an appointment, however he was not able to get in until September, and he is currently seeking out an ophthalmologist.       Objective: See treatment diary below      Assessment: Focused session on review of symptoms, discussion of pursuing pelvic floor PT, and breathing mechanics. Completed brief oculomotor exam with pt demonstrating inability to converge and difficulty with tracking with reports of significant eye strain. I did personally review the videos of these episodes taken by his girlfriend, and he demonstrated tonic positioning mostly on his L side through his neck and UE. Given his symptoms, I continued to stress importance of advocating for his health to his medical team and seeking out providers that he feels comfortable with. Advised pt to keep his functional neurologist appointment and contact an additional neurologist in the meantime  given his recent seizure/syncope type episodes.       Plan: Continue per plan of care.  Progress treatment as tolerated.  Referral to pelvic floor PT.      Precautions: frequent SOB and lightheadedness with laying flat          Insurance:  AMA/CMS Eval/ Re-eval Auth #/ Referral # Total units or visits Start date  Expiration date KX? Visit limitation?  PT only or  PT+OT? Co-Insurance   CMS 7/29/24 Not required        30% coinsurance     9/27           SELF PAY 2/11/2025 (post op)         1NMR   CMS 5/13/2025        30% coins after ded      POC Start Date POC Expiration Date Signed POC?   7/29/2024 9/23/2024 pending   9/27/2024 11/22/2024 2/20/2025 4/17/2025 Pending    5/13/2025 7/8/2025 Pending      2025 Date 2/20 5/13 5/27 6/3           Visits/Units:  Used 1 2 3 4           Authed:  Remaining                    Access Code: [email or web address]  URL: [email or web address]  Date: 05/13/2025  Prepared by: Katarzyna Tristan    Exercises  - Sidelying Open Book Thoracic Lumbar Rotation and Extension  - 1 x daily - 7 x weekly - 2 sets - 10 reps  - Standing Shoulder W at Wall  - 1 x daily - 7 x weekly - 2 sets - 10 reps  - Seated Cat Cow  - 1 x daily - 7 x weekly - 2 sets - 10 reps    Date  6/3 5/27 5/13 2/20   Visit number   4 3 2 - IE (R post op)  1 - IE    Manuals        STM                                Neuro Re-Ed        Scap retraction         Nerve glides        Horizontal abd   Seated RTB 2x10     Lateral costal breathing  Seated with tactile feedback                               Ther Ex        HEP    Initiated  Initiated    Education & management   Pelvic floor PT referral, symptom review, oculomotor assessment  Neurology referral  POC, HEP POC, HEP   Cervical retraction        Thoracic extension        W's on wall         Thoracic rotation   2x10 seated      Seated cat cow   2x10             Ther Activity                        Gait Training                        Modalities

## 2025-06-03 NOTE — TELEPHONE ENCOUNTER
Patient called to request Rxs for:    Requested Prescriptions     Pending Prescriptions Disp Refills    traZODone (DESYREL) 100 mg tablet 90 tablet 0     Sig: Take 1 tablet (100 mg total) by mouth daily at bedtime    amphetamine-dextroamphetamine (ADDERALL XR, 15MG,) 15 MG 24 hr capsule 30 capsule 0     Sig: Take 1 capsule (15 mg total) by mouth every morning Max Daily Amount: 15 mg     Please send to [facility] in [place]

## 2025-06-04 ENCOUNTER — TELEPHONE (OUTPATIENT)
Age: 25
End: 2025-06-04

## 2025-06-04 RX ORDER — TRAZODONE HYDROCHLORIDE 100 MG/1
100 TABLET ORAL
Qty: 90 TABLET | Refills: 0 | Status: SHIPPED | OUTPATIENT
Start: 2025-06-04 | End: 2025-06-05 | Stop reason: SDUPTHER

## 2025-06-04 NOTE — TELEPHONE ENCOUNTER
Spoke with patient, patient is requesting to speak with some one in the office in regards to a medication question for the Trazadone. Patient would like to have the trazadone transferred to the [facility] in [facility]. Please advise.

## 2025-06-04 NOTE — TELEPHONE ENCOUNTER
Patient called to see if Trazodone was refilled.  Saw it was sent to [facility].  Patient hung up.

## 2025-06-05 DIAGNOSIS — F41.8 ANXIETY ASSOCIATED WITH DEPRESSION: ICD-10-CM

## 2025-06-05 RX ORDER — TRAZODONE HYDROCHLORIDE 100 MG/1
100 TABLET ORAL
Qty: 90 TABLET | Refills: 0 | Status: SHIPPED | OUTPATIENT
Start: 2025-06-05 | End: 2025-06-06

## 2025-06-05 RX ORDER — DEXTROAMPHETAMINE SACCHARATE, AMPHETAMINE ASPARTATE MONOHYDRATE, DEXTROAMPHETAMINE SULFATE AND AMPHETAMINE SULFATE 3.75; 3.75; 3.75; 3.75 MG/1; MG/1; MG/1; MG/1
15 CAPSULE, EXTENDED RELEASE ORAL EVERY MORNING
Qty: 30 CAPSULE | Refills: 0 | Status: SHIPPED | OUTPATIENT
Start: 2025-06-05 | End: 2025-06-06

## 2025-06-06 ENCOUNTER — OFFICE VISIT (OUTPATIENT)
Dept: FAMILY MEDICINE CLINIC | Facility: CLINIC | Age: 25
End: 2025-06-06
Payer: COMMERCIAL

## 2025-06-06 VITALS
HEART RATE: 72 BPM | TEMPERATURE: 98 F | RESPIRATION RATE: 14 BRPM | SYSTOLIC BLOOD PRESSURE: 90 MMHG | HEIGHT: 66 IN | BODY MASS INDEX: 24.11 KG/M2 | OXYGEN SATURATION: 96 % | WEIGHT: 150 LBS | DIASTOLIC BLOOD PRESSURE: 62 MMHG

## 2025-06-06 DIAGNOSIS — G54.0 THORACIC OUTLET SYNDROME: ICD-10-CM

## 2025-06-06 DIAGNOSIS — G62.9 POLYNEUROPATHY: ICD-10-CM

## 2025-06-06 DIAGNOSIS — J01.00 ACUTE NON-RECURRENT MAXILLARY SINUSITIS: ICD-10-CM

## 2025-06-06 DIAGNOSIS — F41.8 ANXIETY ASSOCIATED WITH DEPRESSION: ICD-10-CM

## 2025-06-06 DIAGNOSIS — M48.07 SPINAL STENOSIS OF LUMBOSACRAL REGION: ICD-10-CM

## 2025-06-06 DIAGNOSIS — R55 SYNCOPE, UNSPECIFIED SYNCOPE TYPE: Primary | ICD-10-CM

## 2025-06-06 DIAGNOSIS — K64.9 HEMORRHOIDS, UNSPECIFIED HEMORRHOID TYPE: ICD-10-CM

## 2025-06-06 PROCEDURE — 99214 OFFICE O/P EST MOD 30 MIN: CPT | Performed by: STUDENT IN AN ORGANIZED HEALTH CARE EDUCATION/TRAINING PROGRAM

## 2025-06-06 RX ORDER — METHYLPREDNISOLONE 4 MG/1
TABLET ORAL
Qty: 21 EACH | Refills: 0 | Status: SHIPPED | OUTPATIENT
Start: 2025-06-06

## 2025-06-06 NOTE — ASSESSMENT & PLAN NOTE
Vasovagal syncope?  Significant hypertonicity occipital region on the left cervical neck, recommend light stretching/strengthening, heat to region, follows with physical therapy.  Will obtain records from [facility], appears EKG and EEG unremarkable.  Recommend echocardiogram for completeness, evaluation with cardiology if needed/recurrent symptoms.    Discussed with patient based on multiple symptoms we will continue to follow-up with specialties and appropriate workup, close follow-up until further evaluation.  Orders:  •  Echo complete w/ contrast if indicated; Future

## 2025-06-06 NOTE — PROGRESS NOTES
Name: Jay Ortiz      : 2000      MRN: [id number]  Encounter Provider: Low Zaidi DO  Encounter Date: 2025   Encounter department: [facility]  :  Assessment & Plan  Syncope, unspecified syncope type  Vasovagal syncope?  Significant hypertonicity occipital region on the left cervical neck, recommend light stretching/strengthening, heat to region, follows with physical therapy.  Will obtain records from [facility], appears EKG and EEG unremarkable.  Recommend echocardiogram for completeness, evaluation with cardiology if needed/recurrent symptoms.    Discussed with patient based on multiple symptoms we will continue to follow-up with specialties and appropriate workup, close follow-up until further evaluation.  Orders:  •  Echo complete w/ contrast if indicated; Future    Acute non-recurrent maxillary sinusitis  Acute on chronic symptoms based on CT imaging?  Continue amoxicillin, add steroid to help with inflammation/congestion.  There appears to be chronic dental concern, following up with dentistry.  Orders:  •  methylPREDNISolone 4 MG tablet therapy pack; Use as directed on package    Anxiety associated with depression  Given systemic symptoms, recommend discontinuing trazodone and Adderall at this time, will also reduce Zoloft to 25 mg daily, monitor symptoms       Thoracic outlet syndrome  Follows with thoracic surgery after bilateral surgical procedure       Hemorrhoids, unspecified hemorrhoid type  Minimal response to hydrocortisone rectal cream, evaluation with colorectal surgery  Orders:  •  Ambulatory Referral to Colorectal Surgery; Future    Polyneuropathy  Ongoing polyneuropathy especially bilateral lower extremities, most recent MRI of lumbar spine unremarkable, there is some concerns on going to the bathroom intermittently, able to ambulate without concern today in office, follow-up neurosurgery for further evaluation recommended.  Orders:  •   "Ambulatory Referral to Neurosurgery; Future    Spinal stenosis of lumbosacral region    Orders:  •  Ambulatory Referral to Neurosurgery; Future           History of Present Illness   Follow up chronic disease managmnet.       Review of Systems   Constitutional:  Negative for chills and fever.   HENT:  Negative for ear pain and sore throat.    Eyes:  Negative for pain and visual disturbance.   Respiratory:  Negative for cough and shortness of breath.    Cardiovascular:  Negative for chest pain and palpitations.   Gastrointestinal:  Negative for abdominal pain and vomiting.   Genitourinary:  Negative for dysuria and hematuria.   Musculoskeletal:  Negative for arthralgias and back pain.   Skin:  Negative for color change and rash.   Neurological:  Negative for seizures and syncope.   Psychiatric/Behavioral:  Negative for agitation, behavioral problems and confusion.    All other systems reviewed and are negative.      Objective   BP 90/62 (BP Location: Left arm, Patient Position: Sitting, Cuff Size: Standard)   Pulse 72   Temp 98 °F (36.7 °C) (Temporal)   Resp 14   Ht 5' 6\" (1.676 m)   Wt 68 kg (150 lb)   SpO2 96%   BMI 24.21 kg/m²      Physical Exam  Vitals and nursing note reviewed.   Constitutional:       General: He is not in acute distress.     Appearance: He is well-developed.   HENT:      Head: Normocephalic and atraumatic.     Eyes:      General: No scleral icterus.     Conjunctiva/sclera: Conjunctivae normal.       Cardiovascular:      Rate and Rhythm: Normal rate and regular rhythm.      Pulses: Normal pulses.      Heart sounds: No murmur heard.  Pulmonary:      Effort: Pulmonary effort is normal. No respiratory distress.      Breath sounds: Normal breath sounds.   Abdominal:      General: There is no distension.      Palpations: Abdomen is soft.      Tenderness: There is no abdominal tenderness.     Musculoskeletal:         General: No swelling. Normal range of motion.      Cervical back: Neck supple. "     Skin:     General: Skin is warm and dry.      Capillary Refill: Capillary refill takes less than 2 seconds.     Neurological:      General: No focal deficit present.      Mental Status: He is alert and oriented to person, place, and time. Mental status is at baseline.     Psychiatric:         Mood and Affect: Mood normal.         Behavior: Behavior normal.

## 2025-06-06 NOTE — ASSESSMENT & PLAN NOTE
Given systemic symptoms, recommend discontinuing trazodone and Adderall at this time, will also reduce Zoloft to 25 mg daily, monitor symptoms

## 2025-06-10 ENCOUNTER — OFFICE VISIT (OUTPATIENT)
Dept: PHYSICAL THERAPY | Facility: CLINIC | Age: 25
End: 2025-06-10
Payer: COMMERCIAL

## 2025-06-10 DIAGNOSIS — G54.0 THORACIC OUTLET SYNDROME: ICD-10-CM

## 2025-06-10 DIAGNOSIS — Z48.89 AFTERCARE FOLLOWING SURGERY: ICD-10-CM

## 2025-06-10 DIAGNOSIS — R29.898 UPPER EXTREMITY WEAKNESS: Primary | ICD-10-CM

## 2025-06-10 PROCEDURE — 97140 MANUAL THERAPY 1/> REGIONS: CPT

## 2025-06-10 NOTE — PROGRESS NOTES
Daily Note     Today's date: 6/10/2025  Patient name: Jay Ortiz  : 2000  MRN: [id number]  Referring provider: Dc Gibson*  Dx:   Encounter Diagnosis     ICD-10-CM    1. Upper extremity weakness  R29.898       2. Aftercare following surgery  Z48.89       3. Thoracic outlet syndrome  G54.0           Start Time: 1315  Stop Time: 1400  Total time in clinic (min): 45 minutes    Subjective: Pt reports he saw his PCP and was referred to both neurosurgery and colorectal surgery. He reports he has been doing his stretches more frequently and feels like they are helping reduce some tightness/tension. He reports he continues to experience eye strain and tension headaches that he feels like are related to the tightness in his occipital region.       Objective: See treatment diary below      Assessment: Tolerated treatment well. Pt responded well to gentle suboccipital release and cervical traction with reports of headache and eye strain relief. Educated patient how to perform self-suboccipital release, and taught patient's girlfriend how to perform technique to assist him at home.  Patient would benefit from continued PT      Plan: Continue per plan of care.  Progress treatment as tolerated.       Precautions: frequent SOB and lightheadedness with laying flat          Insurance:  AMA/CMS Eval/ Re-eval Auth #/ Referral # Total units or visits Start date  Expiration date KX? Visit limitation?  PT only or  PT+OT? Co-Insurance   CMS 24 Not required        30% coinsurance                SELF PAY 2025 (post op)         [facility]        30% coins after ded      POC Start Date POC Expiration Date Signed POC?   2024 pending   2024 Pending    2025 Pending       Date 2/20 5/13 5/27 6/3 6/10          Visits/Units:  Used 1 2 3 4 5          Authed:  Remaining                    Access Code: [email or web address]  URL:  [email or web address]  Date: 05/13/2025  Prepared by: Katarzyna Tristan    Exercises  - Sidelying Open Book Thoracic Lumbar Rotation and Extension  - 1 x daily - 7 x weekly - 2 sets - 10 reps  - Standing Shoulder W at Wall  - 1 x daily - 7 x weekly - 2 sets - 10 reps  - Seated Cat Cow  - 1 x daily - 7 x weekly - 2 sets - 10 reps    Date 6/10 6/3 5/27 5/13 2/20   Visit number  5 4 3 2 - IE (R post op)  1 - IE    Manuals        STM Suboccipital release       Cervical traction Gentle                        Neuro Re-Ed        Scap retraction         Nerve glides        Horizontal abd   Seated RTB 2x10     Lateral costal breathing  Seated with tactile feedback                               Ther Ex        HEP    Initiated  Initiated    Education & management  Review of stretching for suboccipital region  Pelvic floor PT referral, symptom review, oculomotor assessment  Neurology referral  POC, HEP POC, HEP   Cervical retraction        Thoracic extension        W's on wall         Thoracic rotation   2x10 seated      Seated cat cow   2x10             Ther Activity                        Gait Training                        Modalities

## 2025-06-16 ENCOUNTER — TELEPHONE (OUTPATIENT)
Age: 25
End: 2025-06-16

## 2025-06-17 ENCOUNTER — OFFICE VISIT (OUTPATIENT)
Dept: PHYSICAL THERAPY | Facility: CLINIC | Age: 25
End: 2025-06-17
Payer: COMMERCIAL

## 2025-06-17 DIAGNOSIS — Z48.89 AFTERCARE FOLLOWING SURGERY: ICD-10-CM

## 2025-06-17 DIAGNOSIS — R29.898 UPPER EXTREMITY WEAKNESS: Primary | ICD-10-CM

## 2025-06-17 DIAGNOSIS — G54.0 THORACIC OUTLET SYNDROME: ICD-10-CM

## 2025-06-17 PROCEDURE — 97140 MANUAL THERAPY 1/> REGIONS: CPT

## 2025-06-17 NOTE — PROGRESS NOTES
Daily Note     Today's date: 2025  Patient name: Jay Ortiz  : 2000  MRN: [id number]  Referring provider: Dc Gibson*  Dx:   Encounter Diagnosis     ICD-10-CM    1. Upper extremity weakness  R29.898       2. Aftercare following surgery  Z48.89       3. Thoracic outlet syndrome  G54.0           Start Time: 1112  Stop Time: 1150  Total time in clinic (min): 38 minutes    Subjective: Pt reports he has been working on his occipital stretches over the past week and feels they give him some temporary improvement in symptoms. He reports he has not had any seizure like activity over the past week, but continues with frequent brain fog, jaw pain, and visual issues.       Objective: See treatment diary below      Assessment: Patient presented with slightly reduced tolerance to STM today due to higher symptom irritability and quick onset of eye symptoms/headache with gentle pressure. He was able to tolerate very gentle suboccipital release and cervical traction without reproduction of symptoms. Reviewed breathing exercises to promote dynamic rib cage mobility and continued to recommend pelvic floor physical therapy. Patient would benefit from continued PT      Plan: Continue per plan of care.  Progress treatment as tolerated.       Precautions: frequent SOB and lightheadedness with laying flat          Insurance:  AMA/CMS Eval/ Re-eval Auth #/ Referral # Total units or visits Start date  Expiration date KX? Visit limitation?  PT only or  PT+OT? Co-Insurance   CMS 24 Not required        30% coinsurance                SELF PAY 2025 (post op)         [facility]   CMS 2025        30% coins after ded      POC Start Date POC Expiration Date Signed POC?   2024 pending   2024 Pending    2025 Pending       Date 2/20 5/13 5/27 6/3 6/10 6/17         Visits/Units:  Used 1 2 3 4 5 6         Authed:  Remaining                     Access Code: [email or web address]  URL: [email or web address]  Date: 05/13/2025  Prepared by: Katarzyna Tristan    Exercises  - Sidelying Open Book Thoracic Lumbar Rotation and Extension  - 1 x daily - 7 x weekly - 2 sets - 10 reps  - Standing Shoulder W at Wall  - 1 x daily - 7 x weekly - 2 sets - 10 reps  - Seated Cat Cow  - 1 x daily - 7 x weekly - 2 sets - 10 reps    Date 6/17 6/10 6/3 5/27 5/13 2/20   Visit number  6 5 4 3 2 - IE (R post op)  1 - IE    Manuals         STM Suboccipital release Suboccipital release       Cervical traction Gentle  Gentle                          Neuro Re-Ed         Scap retraction          Nerve glides         Horizontal abd    Seated RTB 2x10     Lateral costal breathing   Seated with tactile feedback                                  Ther Ex         HEP     Initiated  Initiated    Education & management  Breathing exercises  Review of stretching for suboccipital region  Pelvic floor PT referral, symptom review, oculomotor assessment  Neurology referral  POC, HEP POC, HEP   Cervical retraction         Thoracic extension         W's on wall          Thoracic rotation    2x10 seated      Seated cat cow    2x10              Ther Activity                           Gait Training                           Modalities

## 2025-06-17 NOTE — TELEPHONE ENCOUNTER
Appointment Request From: Jay Ortiz     With Provider: Lorna Anthony MD [[facility]]     Preferred Date Range: Any date 6/20/2025 or later     Preferred Times: Any     Reason for visit: Colorectal Surgery Office Visit     Health Maintenance Topic:      Comments:      I called and left a VM to call the office to reschedule

## 2025-06-23 ENCOUNTER — OFFICE VISIT (OUTPATIENT)
Dept: FAMILY MEDICINE CLINIC | Facility: CLINIC | Age: 25
End: 2025-06-23
Payer: COMMERCIAL

## 2025-06-23 VITALS
HEART RATE: 75 BPM | TEMPERATURE: 97.8 F | DIASTOLIC BLOOD PRESSURE: 60 MMHG | HEIGHT: 66 IN | BODY MASS INDEX: 24.91 KG/M2 | WEIGHT: 155 LBS | RESPIRATION RATE: 18 BRPM | OXYGEN SATURATION: 95 % | SYSTOLIC BLOOD PRESSURE: 100 MMHG

## 2025-06-23 DIAGNOSIS — F41.8 ANXIETY ASSOCIATED WITH DEPRESSION: ICD-10-CM

## 2025-06-23 DIAGNOSIS — M54.2 CERVICAL PAIN (NECK): ICD-10-CM

## 2025-06-23 DIAGNOSIS — G54.0 THORACIC OUTLET SYNDROME: ICD-10-CM

## 2025-06-23 DIAGNOSIS — M48.07 SPINAL STENOSIS OF LUMBOSACRAL REGION: ICD-10-CM

## 2025-06-23 DIAGNOSIS — K58.1 IRRITABLE BOWEL SYNDROME WITH CONSTIPATION: ICD-10-CM

## 2025-06-23 DIAGNOSIS — S03.40XA SPRAIN OF TEMPOROMANDIBULAR JOINT, INITIAL ENCOUNTER: Primary | ICD-10-CM

## 2025-06-23 DIAGNOSIS — F90.2 ATTENTION DEFICIT HYPERACTIVITY DISORDER (ADHD), COMBINED TYPE: ICD-10-CM

## 2025-06-23 DIAGNOSIS — G62.9 POLYNEUROPATHY: ICD-10-CM

## 2025-06-23 PROCEDURE — 99214 OFFICE O/P EST MOD 30 MIN: CPT | Performed by: STUDENT IN AN ORGANIZED HEALTH CARE EDUCATION/TRAINING PROGRAM

## 2025-06-23 NOTE — PROGRESS NOTES
Name: Jay Ortiz      : 2000      MRN: [id number]  Encounter Provider: Low Zaidi DO  Encounter Date: 2025   Encounter department: [facility]  :  Assessment & Plan  Sprain of temporomandibular joint, initial encounter  TMJ symptoms, follow-up with physical therapy, patient is also following for cervical neck pain  Orders:  •  Ambulatory Referral to Physical Therapy; Future    Cervical pain (neck)  Trial Voltaren gel  Orders:  •  Diclofenac Sodium (VOLTAREN) 1 %; Apply 2 g topically in the morning and 2 g before bedtime.    Thoracic outlet syndrome  S/p bilateral surgery, appreciate cardiothoracic surgery team recommendations       Irritable bowel syndrome with constipation  Patient is following up with gastroenterology/rectal surgery       Anxiety associated with depression  Patient is currently off medications, monitor       Attention deficit hyperactivity disorder (ADHD), combined type         Polyneuropathy         Spinal stenosis of lumbosacral region                History of Present Illness   Follow up chronic disease management.       Review of Systems   Constitutional:  Negative for chills and fever.   HENT:  Negative for ear pain and sore throat.    Eyes:  Negative for pain and visual disturbance.   Respiratory:  Negative for cough and shortness of breath.    Cardiovascular:  Negative for chest pain and palpitations.   Gastrointestinal:  Negative for abdominal pain, constipation, diarrhea, nausea and vomiting.   Genitourinary:  Negative for dysuria and hematuria.   Musculoskeletal:  Negative for arthralgias and back pain.   Skin:  Negative for color change and rash.   Neurological:  Negative for seizures and syncope.   Psychiatric/Behavioral:  Negative for agitation, behavioral problems and confusion.    All other systems reviewed and are negative.      Objective   /60 (BP Location: Left arm, Patient Position: Sitting, Cuff Size: Standard)   Pulse  "75   Temp 97.8 °F (36.6 °C) (Temporal)   Resp 18   Ht 5' 6\" (1.676 m)   Wt 70.3 kg (155 lb)   SpO2 95%   BMI 25.02 kg/m²      Physical Exam  Vitals and nursing note reviewed.   Constitutional:       General: He is not in acute distress.     Appearance: He is well-developed.   HENT:      Head: Normocephalic and atraumatic.     Eyes:      General: No scleral icterus.     Conjunctiva/sclera: Conjunctivae normal.       Cardiovascular:      Rate and Rhythm: Normal rate and regular rhythm.      Pulses: Normal pulses.      Heart sounds: No murmur heard.  Pulmonary:      Effort: Pulmonary effort is normal. No respiratory distress.      Breath sounds: Normal breath sounds.   Abdominal:      General: There is no distension.      Palpations: Abdomen is soft.      Tenderness: There is no abdominal tenderness.     Musculoskeletal:         General: No swelling. Normal range of motion.      Cervical back: Neck supple.     Skin:     General: Skin is warm and dry.      Capillary Refill: Capillary refill takes less than 2 seconds.     Neurological:      General: No focal deficit present.      Mental Status: He is alert and oriented to person, place, and time. Mental status is at baseline.     Psychiatric:         Mood and Affect: Mood normal.         Behavior: Behavior normal.         "

## 2025-06-30 NOTE — ASSESSMENT & PLAN NOTE
Patient presents as a new consult at the request of PCP for the evaluation of back pain  Patient complains of back pain with lower extremity weakness and numbness, gait instability and some bowel and bladder issues, progressively worsening  No updated PT or pain management regarding this issue  Does have history of thoracic outlet syndrome status post decompression in December 2024 in April 2025 by Dr. Gibson    Imaging:  MRI lumbar spine without contrast 2/2/2025: Normal MRI of the lumbar spine.    Plan:  Imaging reviewed with patient and **, demonstrates normal findings.  Nothing to explain his back pain, lower extremity numbness and weakness, gait instability, etc.  No surgical intervention is warranted.  Recommend PT for back and core strengthening and mobility  Recommend pain management for consideration of radiofrequency ablation given he has mostly back pain rather than radicular symptoms.  Can consider discussion of spinal cord stimulator down the line should he fail conservative measures.  Continue over-the-counter and prescribed medication for pain control  Continue activity as tolerated  Patient is to return to the office on an as-needed basis or sooner should patient develop worsening symptoms or red flag signs      Orders:    Ambulatory Referral to Neurosurgery

## 2025-07-01 ENCOUNTER — CONSULT (OUTPATIENT)
Dept: NEUROSURGERY | Facility: CLINIC | Age: 25
End: 2025-07-01
Attending: STUDENT IN AN ORGANIZED HEALTH CARE EDUCATION/TRAINING PROGRAM
Payer: COMMERCIAL

## 2025-07-01 VITALS
HEIGHT: 66 IN | OXYGEN SATURATION: 98 % | TEMPERATURE: 98.4 F | DIASTOLIC BLOOD PRESSURE: 62 MMHG | WEIGHT: 155 LBS | SYSTOLIC BLOOD PRESSURE: 104 MMHG | HEART RATE: 68 BPM | BODY MASS INDEX: 24.91 KG/M2

## 2025-07-01 DIAGNOSIS — H53.8 BLURRY VISION, BILATERAL: ICD-10-CM

## 2025-07-01 DIAGNOSIS — M48.07 SPINAL STENOSIS OF LUMBOSACRAL REGION: Primary | ICD-10-CM

## 2025-07-01 DIAGNOSIS — R56.9 SEIZURE-LIKE ACTIVITY (HCC): ICD-10-CM

## 2025-07-01 DIAGNOSIS — M54.2 NECK PAIN: ICD-10-CM

## 2025-07-01 DIAGNOSIS — G62.9 POLYNEUROPATHY: ICD-10-CM

## 2025-07-01 DIAGNOSIS — R41.89 COGNITIVE AND BEHAVIORAL CHANGES: ICD-10-CM

## 2025-07-01 DIAGNOSIS — R46.89 COGNITIVE AND BEHAVIORAL CHANGES: ICD-10-CM

## 2025-07-01 PROCEDURE — 99245 OFF/OP CONSLTJ NEW/EST HI 55: CPT | Performed by: PHYSICIAN ASSISTANT

## 2025-07-01 NOTE — PROGRESS NOTES
Name: Jay Ortiz      : 2000      MRN: [id number]  Encounter Provider: Abbey Howard PA-C  Encounter Date: 2025   Encounter department: [facility]  :  Assessment & Plan  Neck pain  Patient presents at the request of his PCP for the evaluation of several complaints  Patient has a myriad of issues including neck pain radiating down his entire spine, seizure-like activity, cognitive impairment, urinary incontinence, weakness and numbness in his legs, etc. states that the symptoms started after his second thoracic outlet syndrome  In 2025    Imaging:  MRI lumbar spine without contrast 2025: Normal MRI of the lumbar spine    Plan:  Patient has a myriad of complaints that at this time are unclear in etiology.  I am worried that some of these are somatic in nature as thus far there has been no clear diagnosis made.  He is having seizure-like activity however EEG completed while having these episodes was normal.  The symptoms all started after his second thoracic outlet syndrome surgery and unfortunately I am unaware of any complications with the surgery that could cause his complaints.  At this time I will order an MRI brain with and without contrast as well as an MRI of the cervical spine without contrast for further evaluation especially given patient does have some slightly brisk reflexes as well as a left Murray.  I have encouraged him to go see his thoracic surgeon given the symptoms happened shortly after his second procedure  He is otherwise okay to continue activities as tolerated  Patient is to return to the office in 2 to 4 weeks upon completion of imaging with [facility] or sooner should patient develop worsening symptoms or red flag signs    Orders:    MRI cervical spine without contrast; Future    Seizure-like activity (HCC)  Patient notes since having his second thoracic outlet syndrome surgery in 2025 he has been having seizure-like activity  multiple times a day which are characterized by blacking out, spitting foam, sleepiness, headaches, memory issues, roving eye movements.  His girlfriend is present during the examination and is able to show video footage of this  EEG completed at outside facility was normal while he was having these episodes, query pseudoseizures  CT head 5/24/2025 without abnormal findings  MRI brain with and without contrast 2/7/2024 without acute abnormality  Will hold off on formal neurology consultation regarding this but will order an MRI brain with and without contrast to assess for any etiology that may be causing patient's symptoms    Orders:    MRI brain w wo contrast; Future    Blurry vision, bilateral  Patient reports that he has blurry vision and was told that he may have enlarged nerves when seen by optometrist  Query papilledema?  Ophthalmology referral sent for formal eye exam/evaluation    Orders:    Ambulatory Referral to Ophthalmology; Future    Cognitive and behavioral changes  Unclear etiology for patient's cognitive issues including memory difficulties, writing difficulties, mood swings  He states that these started shortly after his second thoracic outlet syndrome surgery however I am not aware of any complication with the surgery to be linked with the symptoms  MRI brain has been ordered  I have mentioned seeing his PCP in regards to resuming some of his medications including trazodone, Adderall and Zoloft    Orders:    MRI brain w wo contrast; Future        History of Present Illness     Jay Ortiz is a 24 y.o. male with past medical history significant for ADHD, anxiety and depression, thoracic outlet syndrome status post bilateral decompression in December 2024 and April 2025 who presents for evaluation of several complaints.    Patient is here with his girlfriend.  He has multiple issues today some of which is difficult to follow-up.  He states that he has been having issues with neck pain that  radiates all the way down his back with associated numbness and weakness in both his legs.  He has been having issues with urinary incontinence which sounds to be in line with urge incontinence where he states when he has to go he has to go immediately or he will not make it to the bathroom.  He was also having constipation.  He also endorses having numbness and tingling in his upper extremities.  He eventually underwent a workup for thoracic outlet syndrome which was positive and underwent surgery for correction of this.  He states that this did work to help the numbness and tingling in his upper extremities however he started having weird symptoms after the second procedure that have been unexplained.  Again he continues with significant neck pain especially in the left occipital region as well as the bilateral traps.  He continues to have numbness and weakness in his lower extremities.  He continues to have issues with urinary incontinence.  A new development has been these flashes of warmth that will happen in his whole body and associated with abnormal activity such as blacking out, spitting foam, being very sleepy, headaches, roving eye movements.  He states that his memory has been severely impaired after the second surgery, he cannot remember anything and he cannot even write.  He also has mood swings that are worse when he has flareup. Sometimes if he massages the left occipital region of his head the symptoms will not occur as much during the day but if not he will have several episodes a day.  Girlfriend states that if it is hot outside, if there are bright lights or if patient is stressed out he will get these episodes.  His girlfriend shows videos of these episodes as they are happening and they appear to be seizure-like in nature.  He was seen at the end of May at a hospital in [place] and they did an EEG.  Girlfriend states that he had multiple episodes during the EEG secondary to the bright  "flashing lights however EEG was read as normal without any epileptic activity.  Per the notes patient left AMA and they do endorse that they were not happy with their care there and were \"kicked out\".  Patient is very frustrated during examination and gets overwhelmed by all the information he is asked, all the information he needs to remember and the time line of events.  At 1 point he begins crying and reports a severe headache and it seems like he may have a seizure type episode however he is able to be redirected and calmed down.  His girlfriend helps with most of the history as patient's memory is impaired.  He has not yet gone to see his thoracic surgeon for evaluation of the symptoms as they happened right after his second surgery.  He is frustrated that nobody can tell what is wrong with him but he clearly feels something is wrong and that his life is affected.  He was previously on Zoloft, Adderall and trazodone but was taken off all of these medications as it was unclear if these were contributing to his symptoms.  They did improve slightly after these agents were stopped however it seems in the past few weeks that his symptoms have returned full force.  Other symptoms that he complains about are blurry vision, stating that he was seen by [facility] and they said that he had enlarged nerves but that he needs to see a formal ophthalmologist.  He also complains that his face hurts and that breathing hurts sometimes and that there is something wrong with his sinuses.  He also states that sometimes when he does certain stretches to alleviate certain issues it will cause everything to go out of whack.         Review of Systems   Constitutional: Negative.    HENT: Negative.     Eyes: Negative.    Respiratory: Negative.     Cardiovascular: Negative.    Gastrointestinal: Negative.    Endocrine: Negative.    Genitourinary:  Positive for frequency.   Musculoskeletal:  Positive for back pain, myalgias, neck pain " "and neck stiffness.        Lumbar stenosis/ Polyneuropathy   L/S MRI on 2/2/25   EMG -- none   EEG on 5/30/25    C/o 6/10  neck pain radiates to Lbp  x 3 months   + N/ W on BLE and difficulty walking   Denies pain on BLE   Patient states that when he neck pain occurs he gets this warm, flushing sensation down his body and intermittently loses control of his bladder.   Saw  Neurology on 3/2024   Denies any BBI  Meds:    PT @  May - June 2025  No AC/ non-smoker/ no previous back sx  Thoracic outlet Syndrome-RESECTION RIB,THORACOSCOPIC WITH ROBOTICS on 4/4/25    Skin: Negative.    Allergic/Immunologic: Negative.    Neurological:  Positive for weakness and numbness.   Hematological: Negative.    Psychiatric/Behavioral:  Positive for confusion and sleep disturbance.    All other systems reviewed and are negative.    I have personally reviewed the MA's review of systems and made changes as necessary.    Past Medical History   Past Medical History[1]  Past Surgical History[2]  Family History[3]  he reports that he has never smoked. He has never used smokeless tobacco. He reports that he does not currently use alcohol. He reports current drug use. Frequency: 7.00 times per week. Drug: Marijuana.  Current Outpatient Medications   Medication Instructions    Diclofenac Sodium (VOLTAREN) 2 g, Topical, 2 times daily   Allergies[4]   Objective   /62   Pulse 68   Temp 98.4 °F (36.9 °C) (Temporal)   Ht 5' 6\" (1.676 m)   Wt 70.3 kg (155 lb)   SpO2 98%   BMI 25.02 kg/m²     Physical Exam  Constitutional:       General: He is awake. He is in acute distress (patient is stressed out with everything going on, crying, starts to feel like he is going to black out, overwhelmed).      Appearance: Normal appearance.   HENT:      Head: Normocephalic and atraumatic.      Right Ear: Hearing normal.      Left Ear: Hearing normal.     Eyes:      Extraocular Movements: Extraocular movements intact.      Conjunctiva/sclera: " Conjunctivae normal.     Neck:      Comments: Patient complains of subjective pain in the upper trapezius muscles bilaterally as well as in the neck and the left occipital region however to palpation there is no tenderness noted   Cardiovascular:      Rate and Rhythm: Normal rate.   Pulmonary:      Effort: Pulmonary effort is normal. No respiratory distress.   Chest:        Comments: TTP noted in the upper chest wall, focal point around where his previous surgery was    Musculoskeletal:      Thoracic back: Tenderness present.      Lumbar back: Tenderness present.     Skin:     General: Skin is warm and dry.     Neurological:      Mental Status: He is alert.      Motor: Motor strength is normal.    Psychiatric:         Speech: Speech normal.      Comments: patient is very overwhelmed during visit.  He gets frustrated with his memory and cognition. He becomes tearful during exam and at one point looks like he may black out / starts with severe head pain but is redirectable.       Neurological Exam  Mental Status  Awake and alert. Speech is normal. Follows one-step commands.  Patient is able to answer orientation questions correctly.  He is able to follow commands but really only does well with one-step commands.  He had weird.    Cranial Nerves  CN III, IV, VI: Extraocular movements intact bilaterally.  CN VIII:  Right: Hearing is normal.  Left: Hearing is normal.    Motor  Normal muscle bulk throughout. No fasciculations present. Normal muscle tone. No abnormal involuntary movements. Strength is 5/5 throughout all four extremities.    Reflexes  3+ patellar reflexes  2-3+ bicep/brachioradialis  +left lewis, negative on the right  No clonus bilaterally.    Coordination  Right: Finger-to-nose normal.Left: Finger-to-nose normal.    Gait  Casual gait is normal including stance, stride, and arm swing.      Radiology Results Review: I personally reviewed the following image studies in PACS and associated radiology  reports: MRI brain and MRI spine. My interpretation of the radiology images/reports is: as noted above.    Administrative Statements   I have spent a total time of 60 minutes in caring for this patient on the day of the visit/encounter including Diagnostic results, Prognosis, Risks and benefits of tx options, Instructions for management, Patient and family education, Importance of tx compliance, Risk factor reductions, Impressions, Counseling / Coordination of care, Documenting in the medical record, Reviewing/placing orders in the medical record (including tests, medications, and/or procedures), and Obtaining or reviewing history  .           [1]   Past Medical History:  Diagnosis Date    ADHD     ADHD (attention deficit hyperactivity disorder)     Anxiety     Anxiety     Depression     Depression    [2]   Past Surgical History:  Procedure Laterality Date    ADENOIDECTOMY      ME BRNCHSC INCL FLUOR GDNCE DX W/CELL WASHG SPX N/A 12/13/2024    Procedure: BRONCHOSCOPY FLEXIBLE;  Surgeon: Dc Gibson DO;  Location: [facility];  Service: Thoracic    RESECTION RIB, THORACOSCOPIC WITH ROBOTICS Left 12/13/2024    Procedure: ROBOTIC LEFT FIRST RIB RESECTION;  Surgeon: Dc Gibson DO;  Location: [facility];  Service: Thoracic    RESECTION RIB, THORACOSCOPIC WITH ROBOTICS Right 4/4/2025    Procedure: RESECTION RIB,THORACOSCOPIC WITH ROBOTICS;  Surgeon: Dc Gibson DO;  Location: [facility];  Service: Thoracic    TONSILLECTOMY      TYMPANOSTOMY TUBE PLACEMENT      removed    WISDOM TOOTH EXTRACTION     [3]   Family History  Problem Relation Name Age of Onset    No Known Problems Mother      Diabetes Father     [4]   Allergies  Allergen Reactions    Actical GI Intolerance    Gluten Meal - Food Allergy Other (See Comments)     Per pt my body has trouble digesting it

## 2025-07-01 NOTE — PATIENT INSTRUCTIONS
Patient is to return to the office in 2 to 4 weeks upon completion of MRI brain and cervical spine without contrast.  Otherwise continue activity as tolerated.  Return to thoracic surgeon to discuss if any of his current issues are related to the thoracic outlet syndrome surgery he had.  Follow-up with ophthalmology for possible enlarged optic nerve.  Return to the office sooner should he develop any worsening symptoms or red flag signs.

## 2025-07-03 ENCOUNTER — TELEPHONE (OUTPATIENT)
Age: 25
End: 2025-07-03

## 2025-07-03 NOTE — TELEPHONE ENCOUNTER
Not sure why it was discontinued, but you have to be tapered off of Zoloft, that might be the reason why he is feeling off. I would restart it.  If this happens again, he needs ED visit.  This can't want for Dejuan to return

## 2025-07-03 NOTE — TELEPHONE ENCOUNTER
Patient called regarding seizure like activity  and incontinent issues .denies seizure activity today.Patient wanted to know can he start taking the Sertraline 50 mg that was discontinued on 5/5/25 by provider.Patient states when he was taking the zoloft he wasn't having the seizure activities and felt much better.Declined Emergency Room evaluation.

## 2025-07-07 ENCOUNTER — HOSPITAL ENCOUNTER (OUTPATIENT)
Facility: HOSPITAL | Age: 25
Setting detail: OBSERVATION
Discharge: HOME | End: 2025-07-09
Attending: EMERGENCY MEDICINE | Admitting: HOSPITALIST
Payer: COMMERCIAL

## 2025-07-07 ENCOUNTER — OFFICE VISIT (OUTPATIENT)
Dept: FAMILY MEDICINE CLINIC | Facility: CLINIC | Age: 25
End: 2025-07-07
Payer: COMMERCIAL

## 2025-07-07 VITALS
HEIGHT: 66 IN | DIASTOLIC BLOOD PRESSURE: 78 MMHG | RESPIRATION RATE: 14 BRPM | TEMPERATURE: 98 F | WEIGHT: 146.6 LBS | HEART RATE: 73 BPM | BODY MASS INDEX: 23.56 KG/M2 | OXYGEN SATURATION: 96 % | SYSTOLIC BLOOD PRESSURE: 128 MMHG
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
## 2025-07-07 DIAGNOSIS — R06.02 SOB (SHORTNESS OF BREATH): ICD-10-CM

## 2025-07-07 DIAGNOSIS — K92.2 GI BLEED: ICD-10-CM

## 2025-07-07 DIAGNOSIS — G54.0 THORACIC OUTLET SYNDROME: ICD-10-CM

## 2025-07-07 DIAGNOSIS — R55 SYNCOPE, UNSPECIFIED SYNCOPE TYPE: Primary | ICD-10-CM

## 2025-07-07 DIAGNOSIS — R51.9 GENERALIZED HEADACHES: ICD-10-CM

## 2025-07-07 DIAGNOSIS — R56.9 SEIZURE (HCC): ICD-10-CM

## 2025-07-07 DIAGNOSIS — R55 SYNCOPE: Primary | ICD-10-CM

## 2025-07-07 DIAGNOSIS — K92.1 BLOOD IN STOOL: ICD-10-CM

## 2025-07-07 LAB
ALBUMIN SERPL BCG-MCNC: 4.8 G/DL (ref 3.5–5)
ALP SERPL-CCNC: 55 U/L (ref 34–104)
ALT SERPL W P-5'-P-CCNC: 16 U/L (ref 7–52)
ANION GAP SERPL CALCULATED.3IONS-SCNC: 9 MMOL/L (ref 4–13)
AST SERPL W P-5'-P-CCNC: 15 U/L (ref 13–39)
BASOPHILS # BLD AUTO: 0.03 THOUSANDS/ÂΜL (ref 0–0.1)
BASOPHILS NFR BLD AUTO: 0 % (ref 0–1)
BILIRUB SERPL-MCNC: 2.16 MG/DL (ref 0.2–1)
BUN SERPL-MCNC: 13 MG/DL (ref 5–25)
CALCIUM SERPL-MCNC: 9.8 MG/DL (ref 8.4–10.2)
CHLORIDE SERPL-SCNC: 106 MMOL/L (ref 96–108)
CO2 SERPL-SCNC: 24 MMOL/L (ref 21–32)
CREAT SERPL-MCNC: 0.72 MG/DL (ref 0.6–1.3)
EOSINOPHIL # BLD AUTO: 0.06 THOUSAND/ÂΜL (ref 0–0.61)
EOSINOPHIL NFR BLD AUTO: 1 % (ref 0–6)
ERYTHROCYTE [DISTWIDTH] IN BLOOD BY AUTOMATED COUNT: 12.1 % (ref 11.6–15.1)
GFR SERPL CREATININE-BSD FRML MDRD: 131 ML/MIN/1.73SQ M
GLUCOSE SERPL-MCNC: 87 MG/DL (ref 65–140)
HCT VFR BLD AUTO: 41.6 % (ref 36.5–49.3)
HGB BLD-MCNC: 14.9 G/DL (ref 12–17)
IMM GRANULOCYTES # BLD AUTO: 0.03 THOUSAND/UL (ref 0–0.2)
IMM GRANULOCYTES NFR BLD AUTO: 0 % (ref 0–2)
LYMPHOCYTES # BLD AUTO: 1.66 THOUSANDS/ÂΜL (ref 0.6–4.47)
LYMPHOCYTES NFR BLD AUTO: 16 % (ref 14–44)
MAGNESIUM SERPL-MCNC: 2.2 MG/DL (ref 1.9–2.7)
MCH RBC QN AUTO: 32.2 PG (ref 26.8–34.3)
MCHC RBC AUTO-ENTMCNC: 35.8 G/DL (ref 31.4–37.4)
MCV RBC AUTO: 90 FL (ref 82–98)
MONOCYTES # BLD AUTO: 0.62 THOUSAND/ÂΜL (ref 0.17–1.22)
MONOCYTES NFR BLD AUTO: 6 % (ref 4–12)
NEUTROPHILS # BLD AUTO: 7.9 THOUSANDS/ÂΜL (ref 1.85–7.62)
NEUTS SEG NFR BLD AUTO: 77 % (ref 43–75)
NRBC BLD AUTO-RTO: 0 /100 WBCS
PLATELET # BLD AUTO: 286 THOUSANDS/UL (ref 149–390)
PMV BLD AUTO: 8.8 FL (ref 8.9–12.7)
POTASSIUM SERPL-SCNC: 3.6 MMOL/L (ref 3.5–5.3)
PROT SERPL-MCNC: 6.9 G/DL (ref 6.4–8.4)
RBC # BLD AUTO: 4.63 MILLION/UL (ref 3.88–5.62)
SODIUM SERPL-SCNC: 139 MMOL/L (ref 135–147)
WBC # BLD AUTO: 10.3 THOUSAND/UL (ref 4.31–10.16)

## 2025-07-07 PROCEDURE — 99285 EMERGENCY DEPT VISIT HI MDM: CPT

## 2025-07-07 PROCEDURE — 99285 EMERGENCY DEPT VISIT HI MDM: CPT | Performed by: EMERGENCY MEDICINE

## 2025-07-07 PROCEDURE — 99215 OFFICE O/P EST HI 40 MIN: CPT | Performed by: STUDENT IN AN ORGANIZED HEALTH CARE EDUCATION/TRAINING PROGRAM

## 2025-07-07 PROCEDURE — 36415 COLL VENOUS BLD VENIPUNCTURE: CPT

## 2025-07-07 PROCEDURE — 83735 ASSAY OF MAGNESIUM: CPT

## 2025-07-07 PROCEDURE — 93005 ELECTROCARDIOGRAM TRACING: CPT

## 2025-07-07 PROCEDURE — 80053 COMPREHEN METABOLIC PANEL: CPT

## 2025-07-07 PROCEDURE — 96360 HYDRATION IV INFUSION INIT: CPT

## 2025-07-07 PROCEDURE — 85025 COMPLETE CBC W/AUTO DIFF WBC: CPT

## 2025-07-07 RX ORDER — SERTRALINE HYDROCHLORIDE 25 MG/1
25 TABLET, FILM COATED ORAL DAILY
COMMUNITY
End: 2025-07-11

## 2025-07-07 RX ORDER — DOXYCYCLINE HYCLATE 100 MG
100 TABLET ORAL 2 TIMES DAILY
COMMUNITY
Start: 2025-07-02 | End: 2025-07-11

## 2025-07-07 RX ADMIN — SODIUM CHLORIDE 1000 ML: 0.9 INJECTION, SOLUTION INTRAVENOUS at 18:25

## 2025-07-07 NOTE — ASSESSMENT & PLAN NOTE
Patient is having multiple concerns including unclear syncope at this time, recommend evaluation with [facility], s/p thoracic outlet surgical procedure bilaterally.    Psychogenic seizures?  Patient will likely need neurological workup if admitted.    Appreciate recommendations on blood work/imaging.  Orders:  •  Transfer to other facility

## 2025-07-07 NOTE — ED ATTENDING ATTESTATION
I saw and evaluated the patient. I have discussed the patient with the resident physician and agree with the resident's findings, assessment and plan as documented in the resident physician's note, unless otherwise documented below. All available laboratory and imaging studies were reviewed by myself.  I was present for key portions of any procedure(s) performed by the resident and I was immediately available to provide assistance.     I agree with the current assessment done in the Emergency Department. I have conducted an independent evaluation of this patient    Final Diagnosis:  1. Syncope    2. Generalized headaches    3. GI bleed            Chief Complaint   Patient presents with    Rectal Bleeding     Reports on and off since April. Reports an episode of blood and clots in stool today.     Syncope     Reports LOC a couple times while on toilet. Denies HS. PCP concerned for pseudoseizures.      This is a 24 y.o. male presenting for evaluation of rectal bleeding and seizure-like episodes. Patient has had recurrent episodes of rectal bleeding over the past few months. Has associated rectal pain. 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 He has also been having episodes where he has syncope on the toilet. No head strike. He has had some episodes where he experiences twitching of his face, paraesthesias of face, jaw, and all four extremities. Sometimes has fluctuating level of consciousness in association. Had negative EEG and reassuring head CT previously. Denies fever, chills, cough, chest pain, SOB, n/v/d, abdominal pain, headache, any other complaints.      PMH:   has a past medical history of ADHD, ADHD (attention deficit hyperactivity disorder), Anxiety, Anxiety, Depression, and Depression.    PSH:   has a past surgical history that includes Adenoidectomy; Tonsillectomy; Tympanostomy tube placement; [place] tooth extraction; pr [facility] incl fluor gdnce dx w/cell  "washg spx (N/A, 12/13/2024); Resection Rib, Thoracoscopic With Robotics (Left, 12/13/2024); and Resection Rib, Thoracoscopic With Robotics (Right, 4/4/2025).    Social:  Social History     Substance and Sexual Activity   Alcohol Use Not Currently    Comment: Rarely     Tobacco Use History[1]  Social History     Substance and Sexual Activity   Drug Use Yes    Frequency: 7.0 times per week    Types: Marijuana    Comment: Medical Marijuana     PE:  Vitals:    07/08/25 1430 07/08/25 1500 07/08/25 1705 07/08/25 1706   BP: 104/72 107/70 124/83    BP Location:       Pulse: 78 72 63    Resp:  18 19    Temp:   98.1 °F (36.7 °C)    TempSrc:       SpO2:  98% 98%    Weight: 66.2 kg (146 lb)   64.5 kg (142 lb 3.2 oz)   Height: 5' 6\" (1.676 m)   5' 6\" (1.676 m)         Physical exam:  GENERAL APPEARANCE: Appears comfortable, no acute distress, calm and cooperative   NEURO: GCS 15, no gross focal deficits, cranial nerves grossly intact, clear fluent speech, no facial asymmetry   HEENT: Normocephalic, atraumatic, moist mucous membranes   Neck: Supple, full ROM  CV: RRR, no murmurs, rubs, or gallops  LUNGS: CTAB, no wheezing, rales, or rhonchi. No stridor.  GI: Abdomen soft, non-tender, no rebound or guarding   MSK: Extremities non-tender, no pitting edema  SKIN: Warm and dry      Assessment and plan: This is a 24 y.o. male presenting for evaluation of rectal bleeding and seizure-like episodes.  Within ddx consider hemorrhoids, anal fissure, colitis, diverticulosis, seizure, syncope, MS, cardiac event. Will obtain labs to assess for ethologies, consult neurology.           Code Status: Level 1 - Full Code  Advance Directive and Living Will:      Power of :    POLST:      Medications   doxycycline hyclate (VIBRAMYCIN) capsule 100 mg (100 mg Oral Given 7/8/25 1137)   sertraline (ZOLOFT) tablet 25 mg (25 mg Oral Given 7/8/25 1138)   acetaminophen (TYLENOL) tablet 650 mg (650 mg Oral Given 7/8/25 1175)   polyethylene glycol " (MIRALAX) packet 17 g (0 g Oral Hold 7/8/25 1423)   pantoprazole (PROTONIX) EC tablet 40 mg (has no administration in time range)   hydrocortisone (ANUSOL-HC) 2.5 % rectal cream (1 Application Topical Given 7/8/25 1828)   Diclofenac Sodium (VOLTAREN) 1 % topical gel 2 g (has no administration in time range)   hydrOXYzine HCL (ATARAX) tablet 25 mg (25 mg Oral Given 7/8/25 1828)   sodium chloride 0.9 % bolus 1,000 mL (0 mL Intravenous Stopped 7/7/25 1940)     US right upper quadrant   Final Result      Normal exam.      Workstation performed: [id number]         MRI brain seizure wo and w contrast    (Results Pending)   MRI cervical spine w wo contrast    (Results Pending)     Orders Placed This Encounter   Procedures    US right upper quadrant    MRI brain seizure wo and w contrast    MRI cervical spine w wo contrast    CBC and differential    Comprehensive metabolic panel    Magnesium    Lipid Panel with Direct LDL reflex    Hemoglobin A1c w/EAG Estimation    C-reactive protein    Comprehensive metabolic panel    Magnesium    Phosphorus    CBC and differential    APTT    Protime-INR    Vitamin B12    Hemoglobin and hematocrit, blood    UA w Reflex to Microscopic w Reflex to Culture    TSH, 3rd generation    Rapid drug screen, urine    Ethanol    Celiac Panel/Adult    Tissue Transglutaminase, IgA    IgA    Basic metabolic panel    Magnesium    Hepatic function panel    Diet Regular; Regular House; Gluten Free    Nursing communication Continue IV as ordered.    Notify admitting physician    Notify admitting physician on arrival    Vital Signs q 1h x 4 hours    Vital Signs q 2 h x 8 hours    Vital Signs q 4h x 72 hours    Vital Signs q 8h if stable    Notify physician    I/O    Neuro checks    Provide Stroke Education to Patients    Nursing dysphagia screen prior to starting diet    Baseline NIH stroke scale on Admission    Reassess NIH stroke scale every 24 hours for 2 days    NIH stroke scale at Discharge    24 Hour  Telemetry Monitoring    Activity as Tolerated    Out of Bed to Chair    Insert peripheral IV    Maintain IV access    Continuous Pulse Oximetry    Apply SCD or Foot pumps    Vital Signs    Orthostatic VS (BP + HR)    Level 1-Full Code: all life saving measures are indicated    Inpatient consult to Neurology    Inpatient consult to Case Management    Inpatient Consult to Nutrition Services    Inpatient consult to gastroenterology    OT eval and treat    PT eval and treat    SPEECH Language eval and treatment    ECG 12 lead    Echo complete w/ contrast if indicated    Place in Observation    Seizure precautions     Labs Reviewed   CBC AND DIFFERENTIAL - Abnormal       Result Value Ref Range Status    WBC 10.30 (*) 4.31 - 10.16 Thousand/uL Final    RBC 4.63  3.88 - 5.62 Million/uL Final    Hemoglobin 14.9  12.0 - 17.0 g/dL Final    Hematocrit 41.6  36.5 - 49.3 % Final    MCV 90  82 - 98 fL Final    MCH 32.2  26.8 - 34.3 pg Final    MCHC 35.8  31.4 - 37.4 g/dL Final    RDW 12.1  11.6 - 15.1 % Final    MPV 8.8 (*) 8.9 - 12.7 fL Final    Platelets 286  149 - 390 Thousands/uL Final    nRBC 0  /100 WBCs Final    Segmented % 77 (*) 43 - 75 % Final    Immature Grans % 0  0 - 2 % Final    Lymphocytes % 16  14 - 44 % Final    Monocytes % 6  4 - 12 % Final    Eosinophils Relative 1  0 - 6 % Final    Basophils Relative 0  0 - 1 % Final    Absolute Neutrophils 7.90 (*) 1.85 - 7.62 Thousands/µL Final    Absolute Immature Grans 0.03  0.00 - 0.20 Thousand/uL Final    Absolute Lymphocytes 1.66  0.60 - 4.47 Thousands/µL Final    Absolute Monocytes 0.62  0.17 - 1.22 Thousand/µL Final    Eosinophils Absolute 0.06  0.00 - 0.61 Thousand/µL Final    Basophils Absolute 0.03  0.00 - 0.10 Thousands/µL Final   COMPREHENSIVE METABOLIC PANEL - Abnormal    Sodium 139  135 - 147 mmol/L Final    Potassium 3.6  3.5 - 5.3 mmol/L Final    Chloride 106  96 - 108 mmol/L Final    CO2 24  21 - 32 mmol/L Final    ANION GAP 9  4 - 13 mmol/L Final    BUN 13   5 - 25 mg/dL Final    Creatinine 0.72  0.60 - 1.30 mg/dL Final    Comment: Standardized to IDMS reference method    Glucose 87  65 - 140 mg/dL Final    Comment: If the patient is fasting, the ADA then defines impaired fasting glucose as > 100 mg/dL and diabetes as > or equal to 123 mg/dL.    Calcium 9.8  8.4 - 10.2 mg/dL Final    AST 15  13 - 39 U/L Final    ALT 16  7 - 52 U/L Final    Comment: Specimen collection should occur prior to Sulfasalazine administration due to the potential for falsely depressed results.     Alkaline Phosphatase 55  34 - 104 U/L Final    Total Protein 6.9  6.4 - 8.4 g/dL Final    Albumin 4.8  3.5 - 5.0 g/dL Final    Total Bilirubin 2.16 (*) 0.20 - 1.00 mg/dL Final    Comment: Use of this assay is not recommended for patients undergoing treatment with eltrombopag due to the potential for falsely elevated results.  N-acetyl-p-benzoquinone imine (metabolite of Acetaminophen) will generate erroneously low results in samples for patients that have taken an overdose of Acetaminophen.    eGFR 131  ml/min/1.73sq m Final    Narrative:     National Kidney Disease Foundation guidelines for Chronic Kidney Disease (CKD):     Stage 1 with normal or high GFR (GFR > 90 mL/min/1.73 square meters)    Stage 2 Mild CKD (GFR = 60-89 mL/min/1.73 square meters)    Stage 3A Moderate CKD (GFR = 45-59 mL/min/1.73 square meters)    Stage 3B Moderate CKD (GFR = 30-44 mL/min/1.73 square meters)    Stage 4 Severe CKD (GFR = 15-29 mL/min/1.73 square meters)    Stage 5 End Stage CKD (GFR <15 mL/min/1.73 square meters)  Note: GFR calculation is accurate only with a steady state creatinine   UA W REFLEX TO MICROSCOPIC WITH REFLEX TO CULTURE - Abnormal    Color, UA Light Yellow   Final    Clarity, UA Clear   Final    Specific Gravity, UA 1.009  1.003 - 1.030 Final    pH, UA 6.5  4.5, 5.0, 5.5, 6.0, 6.5, 7.0, 7.5, 8.0 Final    Leukocytes, UA Negative  Negative Final    Nitrite, UA Negative  Negative Final    Protein, UA  Negative  Negative mg/dl Final    Glucose, UA Negative  Negative mg/dl Final    Ketones, UA 40 (2+) (*) Negative mg/dl Final    Urobilinogen, UA <2.0  <2.0 mg/dl mg/dl Final    Bilirubin, UA Negative  Negative Final    Occult Blood, UA Negative  Negative Final   RAPID DRUG SCREEN, URINE - Abnormal    Amph/Meth UR Positive (*) Negative Final    Barbiturate Ur Negative  Negative Final    Benzodiazepine Urine Negative  Negative Final    Cocaine Urine Negative  Negative Final    Methadone Urine Negative  Negative Final    Opiate Urine Negative  Negative Final    PCP Ur Negative  Negative Final    THC Urine Positive (*) Negative Final    Oxycodone Urine Negative  Negative Final    Fentanyl Urine Negative  Negative Final    HYDROCODONE URINE Negative  Negative Final    Narrative:     Presumptive report. If requested, specimen will be sent to reference lab for confirmation.  FOR MEDICAL PURPOSES ONLY.   IF CONFIRMATION NEEDED PLEASE CONTACT THE LAB WITHIN 5 DAYS.    Drug Screen Cutoff Levels:  AMPHETAMINE/METHAMPHETAMINES  1000 ng/mL  BARBITURATES     200 ng/mL  BENZODIAZEPINES     200 ng/mL  COCAINE      300 ng/mL  METHADONE      300 ng/mL  OPIATES      300 ng/mL  PHENCYCLIDINE     25 ng/mL  THC       50 ng/mL  OXYCODONE      100 ng/mL  FENTANYL      5 ng/mL  HYDROCODONE     300 ng/mL   LIPID PANEL WITH DIRECT LDL REFLEX - Abnormal    Cholesterol 129  See Comment mg/dL Final    Comment: Specimen Icteric Results May be affected.  Cholesterol:         Pediatric <18 Years        Desirable          <170 mg/dL      Borderline High    170-199 mg/dL      High               >=200 mg/dL        Adult >=18 Years            Desirable         <200 mg/dL      Borderline High   200-239 mg/dL      High              >239 mg/dL      Triglycerides 77  See Comment mg/dL Final    Comment: Triglyceride:     0-9Y            <75mg/dL     10Y-17Y         <90 mg/dL       >=18Y     Normal          <150 mg/dL     Borderline High 150-199 mg/dL      High            200-499 mg/dL        Very High       >499 mg/dL    Specimen collection should occur prior to Metamizole administration due to the potential for falsely depressed results.    HDL, Direct 35 (*) >=40 mg/dL Final    Comment: HDL Cholesterol:       Low     <41 mg/dL    LDL Calculated 79  0 - 100 mg/dL Final    Comment: LDL Cholesterol:     Optimal           <100 mg/dl     Near Optimal      100-129 mg/dl     Above Optimal       Borderline High 130-159 mg/dl       High            160-189 mg/dl       Very High       >189 mg/dl         This screening LDL is a calculated result.   It does not have the accuracy of the Direct Measured LDL in the monitoring of patients with hyperlipidemia and/or statin therapy.   Direct Measure LDL (RFR971) must be ordered separately in these patients.   COMPREHENSIVE METABOLIC PANEL - Abnormal    Sodium 140  135 - 147 mmol/L Final    Potassium 3.8  3.5 - 5.3 mmol/L Final    Chloride 108  96 - 108 mmol/L Final    CO2 25  21 - 32 mmol/L Final    ANION GAP 7  4 - 13 mmol/L Final    BUN 13  5 - 25 mg/dL Final    Creatinine 0.73  0.60 - 1.30 mg/dL Final    Comment: Standardized to IDMS reference method    Glucose 83  65 - 140 mg/dL Final    Comment: If the patient is fasting, the ADA then defines impaired fasting glucose as > 100 mg/dL and diabetes as > or equal to 123 mg/dL.    Calcium 8.9  8.4 - 10.2 mg/dL Final    AST 11 (*) 13 - 39 U/L Final    ALT 14  7 - 52 U/L Final    Comment: Specimen collection should occur prior to Sulfasalazine administration due to the potential for falsely depressed results.     Alkaline Phosphatase 54  34 - 104 U/L Final    Total Protein 5.9 (*) 6.4 - 8.4 g/dL Final    Albumin 4.1  3.5 - 5.0 g/dL Final    Total Bilirubin 3.30 (*) 0.20 - 1.00 mg/dL Final    Comment: Use of this assay is not recommended for patients undergoing treatment with eltrombopag due to the potential for falsely elevated results.  N-acetyl-p-benzoquinone imine (metabolite of  Acetaminophen) will generate erroneously low results in samples for patients that have taken an overdose of Acetaminophen.    eGFR 130  ml/min/1.73sq m Final    Narrative:     National Kidney Disease Foundation guidelines for Chronic Kidney Disease (CKD):     Stage 1 with normal or high GFR (GFR > 90 mL/min/1.73 square meters)    Stage 2 Mild CKD (GFR = 60-89 mL/min/1.73 square meters)    Stage 3A Moderate CKD (GFR = 45-59 mL/min/1.73 square meters)    Stage 3B Moderate CKD (GFR = 30-44 mL/min/1.73 square meters)    Stage 4 Severe CKD (GFR = 15-29 mL/min/1.73 square meters)    Stage 5 End Stage CKD (GFR <15 mL/min/1.73 square meters)  Note: GFR calculation is accurate only with a steady state creatinine   CBC AND DIFFERENTIAL - Abnormal    WBC 6.89  4.31 - 10.16 Thousand/uL Final    RBC 4.15  3.88 - 5.62 Million/uL Final    Hemoglobin 13.3  12.0 - 17.0 g/dL Final    Hematocrit 38.1  36.5 - 49.3 % Final    MCV 92  82 - 98 fL Final    MCH 32.0  26.8 - 34.3 pg Final    MCHC 34.9  31.4 - 37.4 g/dL Final    RDW 12.2  11.6 - 15.1 % Final    MPV 8.8 (*) 8.9 - 12.7 fL Final    Platelets 234  149 - 390 Thousands/uL Final    nRBC 0  /100 WBCs Final    Segmented % 54  43 - 75 % Final    Immature Grans % 0  0 - 2 % Final    Lymphocytes % 32  14 - 44 % Final    Monocytes % 9  4 - 12 % Final    Eosinophils Relative 5  0 - 6 % Final    Basophils Relative 0  0 - 1 % Final    Absolute Neutrophils 3.72  1.85 - 7.62 Thousands/µL Final    Absolute Immature Grans 0.02  0.00 - 0.20 Thousand/uL Final    Absolute Lymphocytes 2.17  0.60 - 4.47 Thousands/µL Final    Absolute Monocytes 0.61  0.17 - 1.22 Thousand/µL Final    Eosinophils Absolute 0.34  0.00 - 0.61 Thousand/µL Final    Basophils Absolute 0.03  0.00 - 0.10 Thousands/µL Final   MAGNESIUM - Normal    Magnesium 2.2  1.9 - 2.7 mg/dL Final   C-REACTIVE PROTEIN - Normal    CRP <1.0  <3.0 mg/L Final   VITAMIN B12 - Normal    Vitamin B-12 345  180 - 914 pg/mL Final   HEMOGLOBIN AND  HEMATOCRIT, BLOOD - Normal    Hemoglobin 13.4  12.0 - 17.0 g/dL Final    Hematocrit 38.2  36.5 - 49.3 % Final   MEDICAL ALCOHOL - Normal    Ethanol Lvl <10  <10 mg/dL Final   MAGNESIUM - Normal    Magnesium 2.1  1.9 - 2.7 mg/dL Final   PHOSPHORUS - Normal    Phosphorus 2.9  2.7 - 4.5 mg/dL Final   APTT - Normal    PTT 31  23 - 34 seconds Final    Comment: Therapeutic Heparin Range =  60-90 seconds   PROTIME-INR - Normal    Protime 14.6  12.3 - 15.0 seconds Final    INR 1.11  0.85 - 1.19 Final    Narrative:     INR Therapeutic Range    Indication                                             INR Range      Atrial Fibrillation                                               2.0-3.0  Hypercoagulable State                                    2.0.2.3  Left Ventricular Asist Device                            2.0-3.0  Mechanical Heart Valve                                  -    Aortic(with afib, MI, embolism, HF, LA enlargement,    and/or coagulopathy)                                     2.0-3.0 (2.5-3.5)     Mitral                                                             2.5-3.5  Prosthetic/Bioprosthetic Heart Valve               2.0-3.0  Venous thromboembolism (VTE: VT, PE        2.0-3.0   TSH, 3RD GENERATION - Normal    TSH 3RD GENERATION 0.941  0.450 - 4.500 uIU/mL Final    Comment: Adult TSH (3rd generation) reference range follows the recommended guidelines of the American Thyroid Association, January, 2020.   IGA - Normal      66 - 433 mg/dL Final   HEMOGLOBIN A1C    Hemoglobin A1C 4.7  Normal 4.0-5.6%; PreDiabetic 5.7-6.4%; Diabetic >=6.5%; Glycemic control for adults with diabetes <7.0% % Final    EAG 88  mg/dl Final   CELIAC PANEL/ADULT    Narrative:     The following orders were created for panel order Celiac Panel/Adult.  Procedure                               Abnormality         Status                     ---------                               -----------         ------                     Tissue  "Transglutaminase,...[[id number]]                      In process                 IgA[[id number]]                          Normal              Final result                 Please view results for these tests on the individual orders.   TISSUE TRANSGLUTAMINASE (TTG), IGA         Time reflects when diagnosis was documented in both MDM as applicable and the Disposition within this note       Time User Action Codes Description Comment    7/7/2025 10:09 PM [email or web address] Add [R55] Syncope     7/8/2025  1:20 AM Kristine Schwartz Add [R51.9] Generalized headaches     7/8/2025  1:24 AM Kristine Schwartz Add [K92.2] GI bleed           ED Disposition       ED Disposition   Admit    Condition   Stable    Date/Time   Mon Jul 7, 2025 10:09 PM    Comment                  Follow-up Information    None       Current Discharge Medication List        CONTINUE these medications which have NOT CHANGED    Details   Diclofenac Sodium (VOLTAREN) 1 % Apply 2 g topically in the morning and 2 g before bedtime.  Qty: 150 g, Refills: 0    Associated Diagnoses: Cervical pain (neck)      doxycycline hyclate (VIBRA-TABS) 100 mg tablet Take 100 mg by mouth 2 (two) times a day x 10 days, # 20 tab, 0 refill(s)      sertraline (ZOLOFT) 25 mg tablet Take 25 mg by mouth daily           No discharge procedures on file.  Prior to Admission Medications   Prescriptions Last Dose Informant Patient Reported? Taking?   Diclofenac Sodium (VOLTAREN) 1 %   No No   Sig: Apply 2 g topically in the morning and 2 g before bedtime.   doxycycline hyclate (VIBRA-TABS) 100 mg tablet   Yes No   Sig: Take 100 mg by mouth 2 (two) times a day x 10 days, # 20 tab, 0 refill(s)   sertraline (ZOLOFT) 25 mg tablet   Yes No   Sig: Take 25 mg by mouth daily      Facility-Administered Medications: None         Portions of the record may have been created with voice recognition software. Occasional wrong word or \"sound a like\" substitutions may have occurred due to the inherent limitations " of voice recognition software. Read the chart carefully and recognize, using context, where substitutions have occurred.    Electronically signed by:  Anette Wu         [1]   Social History  Tobacco Use   Smoking Status Never   Smokeless Tobacco Never

## 2025-07-07 NOTE — ED PROVIDER NOTES
"Time reflects when diagnosis was documented in both MDM as applicable and the Disposition within this note       Time User Action Codes Description Comment    7/7/2025 10:09 PM WichoTyronea Add [R55] Syncope           ED Disposition       ED Disposition   Admit    Condition   Stable    Date/Time   Mon Jul 7, 2025 10:09 PM    Comment                  Assessment & Plan       Medical Decision Making  Patient is a 24 y.o. male who presents to the ED with various complaints.    Vital signs show the patient is stable.  Physical exam as above.    History and physical exam most consistent with pseudoseizures. However, differential diagnosis included but not limited to syncope, anemia, electrolyte abnormality, arrhythmia, hemorrhoids, colon cancer unlikely.     Plan: We will order CBC, CMP, magnesium, and EKG.  Patient will be given fluid bolus.  We will also reach out to neurology for consultation in the ED.    View ED course above for further discussion on patient workup.     On review of previous records the patient was seen in the ED on 7/4/2025, earlier this morning in [place], and by his PCP earlier this morning.  Visit notes reviewed.    All labs reviewed and utilized in the medical decision making process  All radiology studies independently viewed by me and interpreted by the radiologist.  I reviewed all testing with the patient.     Upon re-evaluation, patient remained stable.    Disposition: Patient admitted to [facility] for neurology consult.    Portions of the record may have been created with voice recognition software. Occasional wrong word or \"sound a like\" substitutions may have occurred due to the inherent limitations of voice recognition software. Read the chart carefully and recognize, using context, where substitutions have occurred.     Amount and/or Complexity of Data Reviewed  Labs: ordered. Decision-making details documented in ED Course.  ECG/medicine tests:  Decision-making details documented in ED " Course.    Risk  Decision regarding hospitalization.        ED Course as of 07/07/25 2345   Mon Jul 07, 2025   1731 Blood Pressure: 129/69   1731 Temperature: 97.5 °F (36.4 °C)   1731 Pulse: 70   1731 Respirations: 16   1731 SpO2: 99 %   1731 ECG 12 lead  Procedure Note: EKG  Date/Time: 07/07/25 5:31 PM   Interpreted by: ZORA DUBOIS D.O.  Indications / Diagnosis: syncope  ECG reviewed by me, the ED Provider: yes   The EKG demonstrates: normal EKG, normal sinus rhythm, unchanged from previous tracings  Rhythm: normal sinus  Intervals: normal intervals  Axis: normal axis  QRS/Blocks: normal QRS  ST Changes: No acute ST Changes, no STD/CESILIA.    1834 CBC and differential(!)  Reassuring   1858 Comprehensive metabolic panel(!)  Reassuring   1858 Magnesium  Reassuring   2045 Discussion with neurology.  Recommended admission to [facility] and will evaluate the patient after admission.       Medications   sodium chloride 0.9 % bolus 1,000 mL (0 mL Intravenous Stopped 7/7/25 1940)       ED Risk Strat Scores                    No data recorded        SBIRT 20yo+      Flowsheet Row Most Recent Value   USMAN: How many times in the past year have you...    Used an illegal drug or used a prescription medication for non-medical reasons? Never Filed at: 07/07/2025 1433                            History of Present Illness       Chief Complaint   Patient presents with    Rectal Bleeding     Reports on and off since April. Reports an episode of blood and clots in stool today.     Syncope     Reports LOC a couple times while on toilet. Denies HS. PCP concerned for pseudoseizures.        Past Medical History[1]   Past Surgical History[2]   Family History[3]   Social History[4]   E-Cigarette/Vaping    E-Cigarette Use Never User       E-Cigarette/Vaping Substances    Nicotine No     THC No     CBD No     Flavoring No     Other No     Unknown No       I have reviewed and agree with the history as documented.     The patient is a 24-year-old male  with a past medical history of IBS, elevated bilirubin, anxiety, depression, ADHD, resected first rib bilaterally who presents today with various complaints.  The patient notes that over the past several months he has been experiencing various concerning neurologic symptoms of weakness, tingling, and semifrequent loss of consciousness.  The patient notes that he had bilateral first rib removed for thoracic outlet syndrome which is when all of his symptoms started.  Patient states that he will have seizure-like activity with his loss of consciousness.  The patient notes that he had an episode of syncope while he was on the toilet this afternoon.  Patient states that he presented to his primary care doctor who referred him to the emergency department for further evaluation.    The patient also notes that he has had some rectal bleeding.  Patient notes that he does have a history of hemorrhoids.  Patient states that this afternoon when he was on the toilet he had an episode of bright red blood and blood clots with his stool.  Patient denies any fevers, chills, chest pain, shortness of breath, nausea, vomiting, diarrhea, vision changes.        Review of Systems   Constitutional:  Positive for fatigue. Negative for chills and fever.   HENT:  Negative for ear pain and sore throat.    Eyes:  Negative for pain and visual disturbance.   Respiratory:  Negative for cough and shortness of breath.    Cardiovascular:  Negative for chest pain and palpitations.   Gastrointestinal:  Positive for blood in stool and constipation. Negative for abdominal pain, diarrhea, nausea and vomiting.   Genitourinary:  Negative for dysuria and hematuria.   Musculoskeletal:  Negative for arthralgias and back pain.   Skin:  Negative for color change and rash.   Neurological:  Positive for seizures, syncope, weakness and numbness (Tingling).   All other systems reviewed and are negative.          Objective       ED Triage Vitals [07/07/25 1431]    Temperature Pulse Blood Pressure Respirations SpO2 Patient Position - Orthostatic VS   97.5 °F (36.4 °C) 70 129/69 16 99 % Sitting      Temp Source Heart Rate Source BP Location FiO2 (%) Pain Score    Temporal Monitor Left arm -- --      Vitals      Date and Time Temp Pulse SpO2 Resp BP Pain Score FACES Pain Rating User   07/07/25 2045 -- 81 98 % 39 -- -- --    07/07/25 1915 -- 70 98 % 35 -- -- --    07/07/25 1431 97.5 °F (36.4 °C) 70 99 % 16 129/69 -- -- JS            Physical Exam  Vitals and nursing note reviewed. Exam conducted with a chaperone present.   Constitutional:       General: He is not in acute distress.     Appearance: Normal appearance. He is well-developed. He is not ill-appearing.   HENT:      Head: Normocephalic and atraumatic.      Right Ear: Tympanic membrane, ear canal and external ear normal.      Left Ear: Tympanic membrane, ear canal and external ear normal.      Nose: Nose normal.      Mouth/Throat:      Mouth: Mucous membranes are moist.      Pharynx: Oropharynx is clear.     Eyes:      General:         Right eye: No discharge.         Left eye: No discharge.      Extraocular Movements: Extraocular movements intact.      Conjunctiva/sclera: Conjunctivae normal.      Pupils: Pupils are equal, round, and reactive to light.       Cardiovascular:      Rate and Rhythm: Normal rate and regular rhythm.      Pulses: Normal pulses.      Heart sounds: Normal heart sounds. No murmur heard.     No friction rub. No gallop.   Pulmonary:      Effort: Pulmonary effort is normal. No respiratory distress.      Breath sounds: Normal breath sounds. No stridor. No wheezing, rhonchi or rales.   Abdominal:      General: Abdomen is flat. There is no distension.      Palpations: Abdomen is soft. There is no mass.      Tenderness: There is no abdominal tenderness. There is no guarding or rebound.   Genitourinary:     Rectum: External hemorrhoid (Small, nonthrombosed) present.     Musculoskeletal:          General: No swelling. Normal range of motion.      Cervical back: Normal range of motion and neck supple. No rigidity or tenderness.     Skin:     General: Skin is warm and dry.      Capillary Refill: Capillary refill takes less than 2 seconds.      Coloration: Skin is not jaundiced.     Neurological:      Mental Status: He is alert and oriented to person, place, and time.      GCS: GCS eye subscore is 4. GCS verbal subscore is 5. GCS motor subscore is 6.      Cranial Nerves: Cranial nerves 2-12 are intact. No cranial nerve deficit, dysarthria or facial asymmetry.      Sensory: Sensory deficit (Patient notes left-sided facial numbness/tingling as well as right lower extremity numbness/tingling) present.      Motor: Motor function is intact. No weakness, atrophy or abnormal muscle tone.      Coordination: Coordination is intact. Finger-Nose-Finger Test and Heel to Shin Test normal.      Gait: Gait normal.     Psychiatric:         Attention and Perception: Attention normal.         Mood and Affect: Mood is anxious.         Speech: Speech is rapid and pressured (Mildly).         Behavior: Behavior is cooperative.         Thought Content: Thought content normal.         Results Reviewed       Procedure Component Value Units Date/Time    Comprehensive metabolic panel [[id number]]  (Abnormal) Collected: 07/07/25 1821    Lab Status: Final result Specimen: Blood from Arm, Left Updated: 07/07/25 1854     Sodium 139 mmol/L      Potassium 3.6 mmol/L      Chloride 106 mmol/L      CO2 24 mmol/L      ANION GAP 9 mmol/L      BUN 13 mg/dL      Creatinine 0.72 mg/dL      Glucose 87 mg/dL      Calcium 9.8 mg/dL      AST 15 U/L      ALT 16 U/L      Alkaline Phosphatase 55 U/L      Total Protein 6.9 g/dL      Albumin 4.8 g/dL      Total Bilirubin 2.16 mg/dL      eGFR 131 ml/min/1.73sq m     Narrative:      National Kid[employer] Disease Foundation guidelines for Chronic Kidney Disease (CKD):     Stage 1 with normal or high GFR (GFR > 90  mL/min/1.73 square meters)    Stage 2 Mild CKD (GFR = 60-89 mL/min/1.73 square meters)    Stage 3A Moderate CKD (GFR = 45-59 mL/min/1.73 square meters)    Stage 3B Moderate CKD (GFR = 30-44 mL/min/1.73 square meters)    Stage 4 Severe CKD (GFR = 15-29 mL/min/1.73 square meters)    Stage 5 End Stage CKD (GFR <15 mL/min/1.73 square meters)  Note: GFR calculation is accurate only with a steady state creatinine    Magnesium [[id number]]  (Normal) Collected: 07/07/25 1821    Lab Status: Final result Specimen: Blood from Arm, Left Updated: 07/07/25 1854     Magnesium 2.2 mg/dL     CBC and differential [[id number]]  (Abnormal) Collected: 07/07/25 1821    Lab Status: Final result Specimen: Blood from Arm, Left Updated: 07/07/25 1834     WBC 10.30 Thousand/uL      RBC 4.63 Million/uL      Hemoglobin 14.9 g/dL      Hematocrit 41.6 %      MCV 90 fL      MCH 32.2 pg      MCHC 35.8 g/dL      RDW 12.1 %      MPV 8.8 fL      Platelets 286 Thousands/uL      nRBC 0 /100 WBCs      Segmented % 77 %      Immature Grans % 0 %      Lymphocytes % 16 %      Monocytes % 6 %      Eosinophils Relative 1 %      Basophils Relative 0 %      Absolute Neutrophils 7.90 Thousands/µL      Absolute Immature Grans 0.03 Thousand/uL      Absolute Lymphocytes 1.66 Thousands/µL      Absolute Monocytes 0.62 Thousand/µL      Eosinophils Absolute 0.06 Thousand/µL      Basophils Absolute 0.03 Thousands/µL             No orders to display       Procedures    ED Medication and Procedure Management   Prior to Admission Medications   Prescriptions Last Dose Informant Patient Reported? Taking?   Diclofenac Sodium (VOLTAREN) 1 %   No No   Sig: Apply 2 g topically in the morning and 2 g before bedtime.   doxycycline hyclate (VIBRA-TABS) 100 mg tablet   Yes No   Sig: Take 100 mg by mouth 2 (two) times a day x 10 days, # 20 tab, 0 refill(s)   sertraline (ZOLOFT) 25 mg tablet   Yes No   Sig: Take 25 mg by mouth daily      Facility-Administered Medications: None      Patient's Medications   Discharge Prescriptions    No medications on file     No discharge procedures on file.  ED SEPSIS DOCUMENTATION   Time reflects when diagnosis was documented in both MDM as applicable and the Disposition within this note       Time User Action Codes Description Comment    7/7/2025 10:09 PM Jessica Sands [R55] Syncope                    [1]   Past Medical History:  Diagnosis Date    ADHD     ADHD (attention deficit hyperactivity disorder)     Anxiety     Anxiety     Depression     Depression    [2]   Past Surgical History:  Procedure Laterality Date    ADENOIDECTOMY      WI BRNCHSC INCL FLUOR GDNCE DX W/CELL WASHG SPX N/A 12/13/2024    Procedure: BRONCHOSCOPY FLEXIBLE;  Surgeon: Dc Gibson DO;  Location: [facility];  Service: Thoracic    RESECTION RIB, THORACOSCOPIC WITH ROBOTICS Left 12/13/2024    Procedure: ROBOTIC LEFT FIRST RIB RESECTION;  Surgeon: Dc Gibson DO;  Location: [facility];  Service: Thoracic    RESECTION RIB, THORACOSCOPIC WITH ROBOTICS Right 4/4/2025    Procedure: RESECTION RIB,THORACOSCOPIC WITH ROBOTICS;  Surgeon: Dc Gibson DO;  Location: [facility];  Service: Thoracic    TONSILLECTOMY      TYMPANOSTOMY TUBE PLACEMENT      removed    WISDOM TOOTH EXTRACTION     [3]   Family History  Problem Relation Name Age of Onset    No Known Problems Mother      Diabetes Father     [4]   Social History  Tobacco Use    Smoking status: Never    Smokeless tobacco: Never   Vaping Use    Vaping status: Never Used   Substance Use Topics    Alcohol use: Not Currently     Comment: Rarely    Drug use: Yes     Frequency: 7.0 times per week     Types: Marijuana     Comment: Medical Marijuana        Eugenio Curry DO  07/07/25 [id number]

## 2025-07-07 NOTE — PROGRESS NOTES
Name: Jay Ortiz      : 2000      MRN: [id number]  Encounter Provider: Low Zaidi DO  Encounter Date: 2025   Encounter department: [facility]  :  Assessment & Plan  Syncope, unspecified syncope type  Patient is having multiple concerns including unclear syncope at this time, recommend evaluation with [facility], s/p thoracic outlet surgical procedure bilaterally.    Psychogenic seizures?  Patient will likely need neurological workup if admitted.    Appreciate recommendations on blood work/imaging.  Orders:  •  Transfer to other facility    Blood in stool    Orders:  •  Transfer to other facility    SOB (shortness of breath)    Orders:  •  Transfer to other facility    Thoracic outlet syndrome    Orders:  •  Transfer to other facility    Tick bite, unspecified site, subsequent encounter         Seizure (HCC)    Orders:  •  Transfer to other facility           History of Present Illness   Follow up visit in office.     Tick Bite  Associated symptoms include fatigue, numbness and weakness. Pertinent negatives include no abdominal pain, arthralgias, chest pain, chills, coughing, fever, rash, sore throat or vomiting.     Review of Systems   Constitutional:  Positive for fatigue. Negative for chills and fever.   HENT:  Negative for ear pain and sore throat.    Eyes:  Negative for pain and visual disturbance.   Respiratory:  Negative for cough, shortness of breath and wheezing.    Cardiovascular:  Negative for chest pain, palpitations and leg swelling.   Gastrointestinal:  Negative for abdominal pain and vomiting.   Genitourinary:  Negative for dysuria and hematuria.   Musculoskeletal:  Negative for arthralgias and back pain.   Skin:  Negative for color change and rash.   Neurological:  Positive for dizziness, tremors, seizures, syncope, weakness and numbness.   Psychiatric/Behavioral:  Negative for agitation, behavioral problems and confusion.    All other  "systems reviewed and are negative.      Objective   /78 (BP Location: Left arm, Patient Position: Sitting, Cuff Size: Standard)   Pulse 73   Temp 98 °F (36.7 °C) (Temporal)   Resp 14   Ht 5' 6\" (1.676 m)   Wt 66.5 kg (146 lb 9.6 oz)   SpO2 96%   BMI 23.66 kg/m²      Physical Exam  Vitals and nursing note reviewed.   Constitutional:       General: He is not in acute distress.     Appearance: He is well-developed.   HENT:      Head: Normocephalic and atraumatic.     Eyes:      General: No scleral icterus.     Conjunctiva/sclera: Conjunctivae normal.       Cardiovascular:      Rate and Rhythm: Normal rate and regular rhythm.      Pulses: Normal pulses.      Heart sounds: No murmur heard.  Pulmonary:      Effort: Pulmonary effort is normal. No respiratory distress.      Breath sounds: Normal breath sounds.   Abdominal:      General: There is no distension.      Palpations: Abdomen is soft.      Tenderness: There is no abdominal tenderness.     Musculoskeletal:         General: No swelling. Normal range of motion.      Cervical back: Neck supple.     Skin:     General: Skin is warm and dry.      Capillary Refill: Capillary refill takes less than 2 seconds.     Neurological:      General: No focal deficit present.      Mental Status: He is alert and oriented to person, place, and time. Mental status is at baseline.     Psychiatric:         Mood and Affect: Mood normal.         Behavior: Behavior normal.         "

## 2025-07-08 ENCOUNTER — APPOINTMENT (OUTPATIENT)
Dept: NON INVASIVE DIAGNOSTICS | Facility: HOSPITAL | Age: 25
End: 2025-07-08
Payer: COMMERCIAL

## 2025-07-08 ENCOUNTER — APPOINTMENT (OUTPATIENT)
Dept: RADIOLOGY | Facility: HOSPITAL | Age: 25
End: 2025-07-08
Payer: COMMERCIAL

## 2025-07-08 PROBLEM — R10.84 GENERALIZED ABDOMINAL PAIN: Status: ACTIVE | Noted: 2025-07-08

## 2025-07-08 PROBLEM — R20.2 PARESTHESIAS: Status: ACTIVE | Noted: 2025-07-08

## 2025-07-08 PROBLEM — K92.2 GI BLEED: Status: ACTIVE | Noted: 2025-07-08

## 2025-07-08 PROBLEM — R51.9 HEADACHE: Status: ACTIVE | Noted: 2025-07-08

## 2025-07-08 PROBLEM — D72.829 LEUKOCYTOSIS: Status: ACTIVE | Noted: 2025-07-08

## 2025-07-08 PROBLEM — R40.4 RECURRENT EPISODES OF UNRESPONSIVENESS: Status: ACTIVE | Noted: 2025-05-30

## 2025-07-08 LAB
ALBUMIN SERPL BCG-MCNC: 4.1 G/DL (ref 3.5–5)
ALP SERPL-CCNC: 54 U/L (ref 34–104)
ALT SERPL W P-5'-P-CCNC: 14 U/L (ref 7–52)
AMPHETAMINES SERPL QL SCN: POSITIVE
ANION GAP SERPL CALCULATED.3IONS-SCNC: 7 MMOL/L (ref 4–13)
AORTIC ROOT: 2.9 CM
APTT PPP: 31 SECONDS (ref 23–34)
ASCENDING AORTA: 2.8 CM
AST SERPL W P-5'-P-CCNC: 11 U/L (ref 13–39)
ATRIAL RATE: 74 BPM
BARBITURATES UR QL: NEGATIVE
BASOPHILS # BLD AUTO: 0.03 THOUSANDS/ÂΜL (ref 0–0.1)
BASOPHILS NFR BLD AUTO: 0 % (ref 0–1)
BENZODIAZ UR QL: NEGATIVE
BILIRUB SERPL-MCNC: 3.3 MG/DL (ref 0.2–1)
BILIRUB UR QL STRIP: NEGATIVE
BSA FOR ECHO PROCEDURE: 1.75 M2
BUN SERPL-MCNC: 13 MG/DL (ref 5–25)
CALCIUM SERPL-MCNC: 8.9 MG/DL (ref 8.4–10.2)
CHLORIDE SERPL-SCNC: 108 MMOL/L (ref 96–108)
CHOLEST SERPL-MCNC: 129 MG/DL (ref ?–200)
CLARITY UR: CLEAR
CO2 SERPL-SCNC: 25 MMOL/L (ref 21–32)
COCAINE UR QL: NEGATIVE
COLOR UR: ABNORMAL
CREAT SERPL-MCNC: 0.73 MG/DL (ref 0.6–1.3)
CRP SERPL QL: <1 MG/L
E WAVE DECELERATION TIME: 183 MS
E/A RATIO: 1.92
EOSINOPHIL # BLD AUTO: 0.34 THOUSAND/ÂΜL (ref 0–0.61)
EOSINOPHIL NFR BLD AUTO: 5 % (ref 0–6)
ERYTHROCYTE [DISTWIDTH] IN BLOOD BY AUTOMATED COUNT: 12.2 % (ref 11.6–15.1)
EST. AVERAGE GLUCOSE BLD GHB EST-MCNC: 88 MG/DL
ETHANOL SERPL-MCNC: <10 MG/DL
FENTANYL UR QL SCN: NEGATIVE
FRACTIONAL SHORTENING: 41 (ref 28–44)
GFR SERPL CREATININE-BSD FRML MDRD: 130 ML/MIN/1.73SQ M
GLUCOSE SERPL-MCNC: 83 MG/DL (ref 65–140)
GLUCOSE UR STRIP-MCNC: NEGATIVE MG/DL
HBA1C MFR BLD: 4.7 %
HCT VFR BLD AUTO: 38.1 % (ref 36.5–49.3)
HCT VFR BLD AUTO: 38.2 % (ref 36.5–49.3)
HDLC SERPL-MCNC: 35 MG/DL
HGB BLD-MCNC: 13.3 G/DL (ref 12–17)
HGB BLD-MCNC: 13.4 G/DL (ref 12–17)
HGB UR QL STRIP.AUTO: NEGATIVE
HYDROCODONE UR QL SCN: NEGATIVE
IGA SERPL-MCNC: 182 MG/DL (ref 66–433)
IMM GRANULOCYTES # BLD AUTO: 0.02 THOUSAND/UL (ref 0–0.2)
IMM GRANULOCYTES NFR BLD AUTO: 0 % (ref 0–2)
INR PPP: 1.11 (ref 0.85–1.19)
INTERVENTRICULAR SEPTUM IN DIASTOLE (PARASTERNAL SHORT AXIS VIEW): 0.9 CM
INTERVENTRICULAR SEPTUM: 0.9 CM (ref 0.6–1.1)
KETONES UR STRIP-MCNC: ABNORMAL MG/DL
LAAS-AP2: 20.8 CM2
LAAS-AP4: 17.6 CM2
LDLC SERPL CALC-MCNC: 79 MG/DL (ref 0–100)
LEFT ATRIUM SIZE: 4 CM
LEFT ATRIUM VOLUME (MOD BIPLANE): 52 ML
LEFT ATRIUM VOLUME INDEX (MOD BIPLANE): 29.7 ML/M2
LEFT INTERNAL DIMENSION IN SYSTOLE: 3 CM (ref 2.1–4)
LEFT VENTRICLE DIASTOLIC VOLUME (MOD BIPLANE): 102 ML
LEFT VENTRICLE DIASTOLIC VOLUME INDEX (MOD BIPLANE): 58.3 ML/M2
LEFT VENTRICLE SYSTOLIC VOLUME (MOD BIPLANE): 28 ML
LEFT VENTRICLE SYSTOLIC VOLUME INDEX (MOD BIPLANE): 16 ML/M2
LEFT VENTRICULAR INTERNAL DIMENSION IN DIASTOLE: 5.1 CM (ref 3.5–6)
LEFT VENTRICULAR POSTERIOR WALL IN END DIASTOLE: 0.9 CM
LEFT VENTRICULAR STROKE VOLUME: 90 ML
LEUKOCYTE ESTERASE UR QL STRIP: NEGATIVE
LV EF BIPLANE MOD: 73 %
LV EF US.2D.A4C+ESTIMATED: 64 %
LVSV (TEICH): 90 ML
LYMPHOCYTES # BLD AUTO: 2.17 THOUSANDS/ÂΜL (ref 0.6–4.47)
LYMPHOCYTES NFR BLD AUTO: 32 % (ref 14–44)
MAGNESIUM SERPL-MCNC: 2.1 MG/DL (ref 1.9–2.7)
MCH RBC QN AUTO: 32 PG (ref 26.8–34.3)
MCHC RBC AUTO-ENTMCNC: 34.9 G/DL (ref 31.4–37.4)
MCV RBC AUTO: 92 FL (ref 82–98)
METHADONE UR QL: NEGATIVE
MONOCYTES # BLD AUTO: 0.61 THOUSAND/ÂΜL (ref 0.17–1.22)
MONOCYTES NFR BLD AUTO: 9 % (ref 4–12)
MV E'TISSUE VEL-SEP: 17 CM/S
MV PEAK A VEL: 0.37 M/S
MV PEAK E VEL: 71 CM/S
MV STENOSIS PRESSURE HALF TIME: 53 MS
MV VALVE AREA P 1/2 METHOD: 4.15
NEUTROPHILS # BLD AUTO: 3.72 THOUSANDS/ÂΜL (ref 1.85–7.62)
NEUTS SEG NFR BLD AUTO: 54 % (ref 43–75)
NITRITE UR QL STRIP: NEGATIVE
NRBC BLD AUTO-RTO: 0 /100 WBCS
OPIATES UR QL SCN: NEGATIVE
OXYCODONE+OXYMORPHONE UR QL SCN: NEGATIVE
P AXIS: 67 DEGREES
PCP UR QL: NEGATIVE
PH UR STRIP.AUTO: 6.5 [PH]
PHOSPHATE SERPL-MCNC: 2.9 MG/DL (ref 2.7–4.5)
PLATELET # BLD AUTO: 234 THOUSANDS/UL (ref 149–390)
PMV BLD AUTO: 8.8 FL (ref 8.9–12.7)
POTASSIUM SERPL-SCNC: 3.8 MMOL/L (ref 3.5–5.3)
PR INTERVAL: 138 MS
PROT SERPL-MCNC: 5.9 G/DL (ref 6.4–8.4)
PROT UR STRIP-MCNC: NEGATIVE MG/DL
PROTHROMBIN TIME: 14.6 SECONDS (ref 12.3–15)
QRS AXIS: 86 DEGREES
QRSD INTERVAL: 92 MS
QT INTERVAL: 374 MS
QTC INTERVAL: 415 MS
RBC # BLD AUTO: 4.15 MILLION/UL (ref 3.88–5.62)
RIGHT ATRIUM AREA SYSTOLE A4C: 15.2 CM2
RIGHT VENTRICLE ID DIMENSION: 3.7 CM
SL CV LEFT ATRIUM LENGTH A2C: 5.5 CM
SL CV LV EF: 65
SL CV PED ECHO LEFT VENTRICLE DIASTOLIC VOLUME (MOD BIPLANE) 2D: 126 ML
SL CV PED ECHO LEFT VENTRICLE SYSTOLIC VOLUME (MOD BIPLANE) 2D: 36 ML
SODIUM SERPL-SCNC: 140 MMOL/L (ref 135–147)
SP GR UR STRIP.AUTO: 1.01 (ref 1–1.03)
T WAVE AXIS: 46 DEGREES
THC UR QL: POSITIVE
TRICUSPID ANNULAR PLANE SYSTOLIC EXCURSION: 2.3 CM
TRIGL SERPL-MCNC: 77 MG/DL (ref ?–150)
TSH SERPL DL<=0.05 MIU/L-ACNC: 0.94 UIU/ML (ref 0.45–4.5)
UROBILINOGEN UR STRIP-ACNC: <2 MG/DL
VENTRICULAR RATE: 74 BPM
VIT B12 SERPL-MCNC: 345 PG/ML (ref 180–914)
WBC # BLD AUTO: 6.89 THOUSAND/UL (ref 4.31–10.16)

## 2025-07-08 PROCEDURE — 85018 HEMOGLOBIN: CPT | Performed by: HOSPITALIST

## 2025-07-08 PROCEDURE — 76705 ECHO EXAM OF ABDOMEN: CPT

## 2025-07-08 PROCEDURE — 80061 LIPID PANEL: CPT | Performed by: HOSPITALIST

## 2025-07-08 PROCEDURE — 85014 HEMATOCRIT: CPT | Performed by: HOSPITALIST

## 2025-07-08 PROCEDURE — 84100 ASSAY OF PHOSPHORUS: CPT | Performed by: HOSPITALIST

## 2025-07-08 PROCEDURE — 83036 HEMOGLOBIN GLYCOSYLATED A1C: CPT | Performed by: HOSPITALIST

## 2025-07-08 PROCEDURE — 36415 COLL VENOUS BLD VENIPUNCTURE: CPT | Performed by: HOSPITALIST

## 2025-07-08 PROCEDURE — 85025 COMPLETE CBC W/AUTO DIFF WBC: CPT | Performed by: HOSPITALIST

## 2025-07-08 PROCEDURE — 83735 ASSAY OF MAGNESIUM: CPT | Performed by: HOSPITALIST

## 2025-07-08 PROCEDURE — 84443 ASSAY THYROID STIM HORMONE: CPT | Performed by: HOSPITALIST

## 2025-07-08 PROCEDURE — 99223 1ST HOSP IP/OBS HIGH 75: CPT | Performed by: HOSPITALIST

## 2025-07-08 PROCEDURE — 80053 COMPREHEN METABOLIC PANEL: CPT | Performed by: HOSPITALIST

## 2025-07-08 PROCEDURE — 82607 VITAMIN B-12: CPT | Performed by: HOSPITALIST

## 2025-07-08 PROCEDURE — 85730 THROMBOPLASTIN TIME PARTIAL: CPT | Performed by: HOSPITALIST

## 2025-07-08 PROCEDURE — 80307 DRUG TEST PRSMV CHEM ANLYZR: CPT | Performed by: HOSPITALIST

## 2025-07-08 PROCEDURE — 86140 C-REACTIVE PROTEIN: CPT | Performed by: HOSPITALIST

## 2025-07-08 PROCEDURE — 93306 TTE W/DOPPLER COMPLETE: CPT | Performed by: INTERNAL MEDICINE

## 2025-07-08 PROCEDURE — 93010 ELECTROCARDIOGRAM REPORT: CPT | Performed by: INTERNAL MEDICINE

## 2025-07-08 PROCEDURE — 86364 TISS TRNSGLTMNASE EA IG CLAS: CPT | Performed by: STUDENT IN AN ORGANIZED HEALTH CARE EDUCATION/TRAINING PROGRAM

## 2025-07-08 PROCEDURE — 82077 ASSAY SPEC XCP UR&BREATH IA: CPT | Performed by: HOSPITALIST

## 2025-07-08 PROCEDURE — 81003 URINALYSIS AUTO W/O SCOPE: CPT | Performed by: HOSPITALIST

## 2025-07-08 PROCEDURE — 85610 PROTHROMBIN TIME: CPT | Performed by: HOSPITALIST

## 2025-07-08 PROCEDURE — 93306 TTE W/DOPPLER COMPLETE: CPT

## 2025-07-08 PROCEDURE — 82784 ASSAY IGA/IGD/IGG/IGM EACH: CPT | Performed by: STUDENT IN AN ORGANIZED HEALTH CARE EDUCATION/TRAINING PROGRAM

## 2025-07-08 PROCEDURE — 99222 1ST HOSP IP/OBS MODERATE 55: CPT | Performed by: INTERNAL MEDICINE

## 2025-07-08 RX ORDER — KETOROLAC TROMETHAMINE 30 MG/ML
15 INJECTION, SOLUTION INTRAMUSCULAR; INTRAVENOUS EVERY 6 HOURS PRN
Status: DISCONTINUED | OUTPATIENT
Start: 2025-07-08 | End: 2025-07-08

## 2025-07-08 RX ORDER — SERTRALINE HYDROCHLORIDE 25 MG/1
25 TABLET, FILM COATED ORAL DAILY
Status: DISCONTINUED | OUTPATIENT
Start: 2025-07-08 | End: 2025-07-09 | Stop reason: HOSPADM

## 2025-07-08 RX ORDER — DOXYCYCLINE 100 MG/1
100 CAPSULE ORAL EVERY 12 HOURS SCHEDULED
Status: DISCONTINUED | OUTPATIENT
Start: 2025-07-08 | End: 2025-07-09 | Stop reason: HOSPADM

## 2025-07-08 RX ORDER — ACETAMINOPHEN 325 MG/1
650 TABLET ORAL EVERY 4 HOURS PRN
Status: DISCONTINUED | OUTPATIENT
Start: 2025-07-08 | End: 2025-07-09 | Stop reason: HOSPADM

## 2025-07-08 RX ORDER — POLYETHYLENE GLYCOL 3350 17 G/17G
17 POWDER, FOR SOLUTION ORAL DAILY
Status: DISCONTINUED | OUTPATIENT
Start: 2025-07-08 | End: 2025-07-09 | Stop reason: HOSPADM

## 2025-07-08 RX ORDER — PANTOPRAZOLE SODIUM 40 MG/10ML
40 INJECTION, POWDER, LYOPHILIZED, FOR SOLUTION INTRAVENOUS
Status: DISCONTINUED | OUTPATIENT
Start: 2025-07-08 | End: 2025-07-08

## 2025-07-08 RX ORDER — PANTOPRAZOLE SODIUM 40 MG/1
40 TABLET, DELAYED RELEASE ORAL
Status: DISCONTINUED | OUTPATIENT
Start: 2025-07-09 | End: 2025-07-09 | Stop reason: HOSPADM

## 2025-07-08 RX ORDER — HYDROXYZINE HYDROCHLORIDE 25 MG/1
25 TABLET, FILM COATED ORAL EVERY 6 HOURS PRN
Status: DISCONTINUED | OUTPATIENT
Start: 2025-07-08 | End: 2025-07-09 | Stop reason: HOSPADM

## 2025-07-08 RX ORDER — HYDROCORTISONE 25 MG/G
CREAM TOPICAL 2 TIMES DAILY
Status: DISCONTINUED | OUTPATIENT
Start: 2025-07-08 | End: 2025-07-09 | Stop reason: HOSPADM

## 2025-07-08 RX ADMIN — PANTOPRAZOLE SODIUM 40 MG: 40 INJECTION, POWDER, FOR SOLUTION INTRAVENOUS at 02:18

## 2025-07-08 RX ADMIN — HYDROXYZINE HYDROCHLORIDE 25 MG: 25 TABLET, FILM COATED ORAL at 18:28

## 2025-07-08 RX ADMIN — KETOROLAC TROMETHAMINE 15 MG: 30 INJECTION, SOLUTION INTRAMUSCULAR; INTRAVENOUS at 02:18

## 2025-07-08 RX ADMIN — ACETAMINOPHEN 650 MG: 325 TABLET ORAL at 16:08

## 2025-07-08 RX ADMIN — SERTRALINE HYDROCHLORIDE 25 MG: 25 TABLET ORAL at 11:38

## 2025-07-08 RX ADMIN — HYDROCORTISONE 2.5% 1 APPLICATION: 25 CREAM TOPICAL at 18:28

## 2025-07-08 RX ADMIN — DICLOFENAC SODIUM 2 G: 10 GEL TOPICAL at 21:39

## 2025-07-08 RX ADMIN — ACETAMINOPHEN 650 MG: 325 TABLET ORAL at 02:18

## 2025-07-08 RX ADMIN — DOXYCYCLINE 100 MG: 100 CAPSULE ORAL at 21:39

## 2025-07-08 RX ADMIN — DOXYCYCLINE 100 MG: 100 CAPSULE ORAL at 11:37

## 2025-07-08 NOTE — ED NOTES
This RN was doing her hourly roundings when she heard the patient crying. RN approached patient when he states that nothing is being done for him. Patient requested he be discharged home. This RN asked if there was another she can do to improve his care. Patient declined all measures. This RN reached out to the provider to come to bedside to speak with patient.      Karolyn Wilkins RN  07/08/25 [id number]

## 2025-07-08 NOTE — ASSESSMENT & PLAN NOTE
? If he is having SPG neuralgia  ALso concerning that optometrist wanted him to see an ophthalmologist, ophthalmology consulted  Neurology consulted

## 2025-07-08 NOTE — QUICK NOTE
Post admit follow-up note: Patient seen and examined at bedside independently of admitting provider.  Patient resting comfortably in bed.  States he feels well and has no new complaints.  Plan for neurology and gastroenterology evaluations for syncope/seizure-like activity and GI bleed, respectively.  A large portion of this workup will likely be outpatient.  Patient does have amphetamines on his tox panel but states he has not taken Adderall for over a month.  Awaiting echocardiogram and orthostatics. Appreciate specialist expertise

## 2025-07-08 NOTE — ASSESSMENT & PLAN NOTE
Obs tele, stroke pathway initiated but felt less likely  Neuro check, A1c, FLP, neurology consult, therapy evals  Echo  MRI brain and c-spine with and without contrast

## 2025-07-08 NOTE — CONSULTS
Consultation - Neurology   Name: Jay Ortiz 24 y.o. male I MRN: [id number]  Unit/Bed#: ED 09 I Date of Admission: 7/7/2025   Date of Service: 7/8/2025 I Hospital Day: 0   Inpatient consult to Neurology  Consult performed by: John Cruz DO  Consult ordered by: Kritsine Schwartz DO        Physician Requesting Evaluation: Kristine Schwartz DO   Reason for Evaluation / Principal Problem: Spells of unresponsiveness/seizure like activity    Assessment & Plan  Recurrent episodes of unresponsiveness  24-year-old male with a PMHx of ADHD, anxiety/depression and rib surgery for thoracic outlet syndrome who presents with recurrent episodes of unresponsiveness, eye-rolling, and drooling lasting 30-60 seconds, occurring multiple times daily (beginning several weeks ago). Has also been experiencing brain fog, amnesia/lapses in time/memory.    Seen at outside hospital in late May 2025 for these episodes (normal routine EEG; left AMA before MRI could be completed; was advised to stop his home mediactions (Trazodone, Adderall, and Sertraline).     Episodes have continued; referred to ED yesterday 7/7 by PCP for further workup    Patient/girlfriend note that an episode of interest may have occurred during photic stimulation during the routine EEG in late May at [facility] (tracking down EEG report to clarify); overall do have high suspicion for non-epileptic origin to events.     Neuroimaging/Workup  -CT head (5/24/2025): No acute intracranial abnormality. Bilateral maxillary sinus air-fluid levels consistent with acute sinusitis  -EEG (5/30/2025): This routine video electroencephalogram capturing wakefulness and drowsiness is normal      Plan  - Patient reportedly may have had an event/spell of interest during photic stimulation during his routine EEG at [place] in late May (patient/girlfriend trying to obtain that EEG report to confirm)....... would be helpful to see if event was truly  captured    -While routine EEG report is being tracked down, no clear indication for routine EEG or video EEG monitoring at this juncture   -Agree with possible ambulatory EEG as OP if events persist and the event of interest was NOT captured on the prior routine EEG  -Would not start AEDs at this time given high suspicion for non-epileptic events  -MRI brain/cervical spine ordered by primary team, agree with obtaining (to rule out structural pathology causing above constellation of symptoms)  -Seizure precautions/supportive care  -Advised continued follow up/management via OP psychiatry/behavioral health (future consideration for cognitive behavioral therapy)   -Defer management of home medications including (Adderall, Trazodone, Sertratline) to psych  -Advised to continue to refrain from driving given ongoing events of LOC/unresponsiveness  -Seizure precautions otherwise:   -No swimming alone, no climbing ladders/heights, no operating dangerous machinery  -Rest of care per primary team    Discussed plan of care with attending neurologist.     Jay Ortiz will need follow-up in in 6 weeks with epilepsy team for Other in 60 minute appointment. Need for OP neurologic testing/EEG has yet TBD.     History of Present Illness   Jay rOtiz is a 24 y.o. male with PMHx of ADHD, anxiety, and depression who presents with recurrent episodes of unresponsiveness. He was recently diagnosed with thoracic outlet syndrome and underwent bilateral first rib resections on 12/13/2024 and 4/4/2025.    On further discussion with patient as well as his girlfriend at bedside this morning.  He endorses that following his surgeries for thoracic outlet syndrome he can began to experience a multitude of nonspecific neurologic complaints.  This includes brain fog, lapses in memory/time, scalp tenderness and numbness, non-lateralizing numbness to the face, poor cognition/memory, and the aforementioned episodes of syncope/loss of  consciousness.    Girlfriend describes a recent event, states patient was on the toilet and had rapid eye movements in either direction followed by going unresponsive and eyes rolling back in his head (no twitching/jerking/convulsive activity noted).     Episodes have been lasting no more than a minute in duration. No tongue bite/incontinence with episodes. Girlfriend and patient note the heat/warm weather is a trigger for his events. Has an aura/prodrome of increasing head pressure prior to the episodes happening.     Above episodes of LOC/unresponsiveness, began around late May, was seen at [facility] initially. Neurology assessment suspected a psychogenic etiology over true epileptic or syncopal events. EEG was normal, and brain MRI was recommended but patient left AMA. Since that admission/hospitalization, the events have continued to occur (sometimes multiple daily)    Patient was evaluated by his PCP yesterday, and given ongoing episodes of concern potentially for nonepileptic spells/seizures, was referred to the ED for evaluation and further workup.    Review of Systems   Constitutional:  Positive for fatigue. Negative for chills, diaphoresis, fever and unexpected weight change.   HENT: Negative.     Eyes:  Negative for photophobia and visual disturbance.   Neurological:  Positive for light-headedness, numbness and headaches. Negative for dizziness, tremors, seizures, syncope, facial asymmetry, speech difficulty and weakness.        Medical History Review: I have reviewed the patient's PMH, PSH, Social History, Family History, Meds, and Allergies     Objective :  Temp:  [97.5 °F (36.4 °C)-98 °F (36.7 °C)] 97.9 °F (36.6 °C)  HR:  [55-81] 67  BP: (106-129)/(57-78) 114/68  Resp:  [14-39] 26  SpO2:  [96 %-99 %] 98 %  O2 Device: None (Room air)    Examined alongside Dr. Joseph this AM.  Physical Exam  Constitutional:       Appearance: Normal appearance.   HENT:      Head: Normocephalic and atraumatic.      Eyes:      Extraocular Movements: Extraocular movements intact.      Conjunctiva/sclera: Conjunctivae normal.      Pupils: Pupils are equal, round, and reactive to light.       Cardiovascular:      Rate and Rhythm: Normal rate.   Pulmonary:      Effort: Pulmonary effort is normal.   Abdominal:      General: There is no distension.     Musculoskeletal:         General: Normal range of motion.      Cervical back: Normal range of motion and neck supple.     Skin:     General: Skin is warm and dry.     Neurological:      Motor: Motor strength is normal.    Psychiatric:         Speech: Speech normal.     Neurological Exam  Mental Status  Awake, alert and oriented to person, place and time. Oriented to person, place and time. Speech is normal. Language is fluent with no aphasia. Attention and concentration are normal.    Cranial Nerves  CN II: Visual acuity is normal. Visual fields full to confrontation.  CN III, IV, VI: Extraocular movements intact bilaterally. Pupils equal round and reactive to light bilaterally.  CN VII: Full and symmetric facial movement.  CN VIII: Hearing is normal.  CN IX, X: Palate elevates symmetrically  CN XI: Shoulder shrug strength is normal.  CN XII: Tongue midline without atrophy or fasciculations.  Inconsistent facial sensation (subjective uestionable diminished sensation on L side compared to R).    Motor  Normal muscle bulk throughout. Normal muscle tone. Strength is 5/5 throughout all four extremities.    Sensory  Light touch is normal in upper and lower extremities.     Coordination  Right: Finger-to-nose normal. Heel-to-shin normal.Left: Finger-to-nose normal. Heel-to-shin normal.  No volitional movement ataxia/dysmetria.    Gait    Deferred.        Lab Results: I have reviewed the following results:CBC:   Results from last 7 days   Lab Units 07/08/25  0501 07/08/25  0214 07/07/25  1821   WBC Thousand/uL 6.89  --  10.30*   RBC Million/uL 4.15  --  4.63   HEMOGLOBIN g/dL 13.3 13.4  14.9   HEMATOCRIT % 38.1 38.2 41.6   MCV fL 92  --  90   PLATELETS Thousands/uL 234  --  286   , BMP/CMP:   Results from last 7 days   Lab Units 07/08/25  0501 07/07/25  1821   SODIUM mmol/L 140 139   POTASSIUM mmol/L 3.8 3.6   CHLORIDE mmol/L 108 106   CO2 mmol/L 25 24   BUN mg/dL 13 13   CREATININE mg/dL 0.73 0.72   CALCIUM mg/dL 8.9 9.8   AST U/L 11* 15   ALT U/L 14 16   ALK PHOS U/L 54 55   EGFR ml/min/1.73sq m 130 131   , Vitamin B12:   Results from last 7 days   Lab Units 07/08/25  0214   VITAMIN B 12 pg/mL 345   , HgBA1C:   Results from last 7 days   Lab Units 07/08/25  0501   HEMOGLOBIN A1C % 4.7   , TSH:   Results from last 7 days   Lab Units 07/08/25  0501   TSH 3RD GENERATON uIU/mL 0.941   , Coagulation:   Results from last 7 days   Lab Units 07/08/25  0501   INR  1.11   , Lipid Profile:   Results from last 7 days   Lab Units 07/08/25  0501   HDL mg/dL 35*   LDL CALC mg/dL 79   TRIGLYCERIDES mg/dL 77   ,Urinalysis:   Results from last 7 days   Lab Units 07/08/25  0233   COLOR UA  Light Yellow   CLARITY UA  Clear   SPEC GRAV UA  1.009   PH UA  6.5   LEUKOCYTES UA  Negative   NITRITE UA  Negative   GLUCOSE UA mg/dl Negative   KETONES UA mg/dl 40 (2+)*   BILIRUBIN UA  Negative   BLOOD UA  Negative   , Drug Screen:   Results from last 7 days   Lab Units 07/08/25  0214   BARBITURATE UR  Negative   BENZODIAZEPINE UR  Negative   THC UR  Positive*   COCAINE UR  Negative   METHADONE URINE  Negative   OPIATE UR  Negative   PCP UR  Negative     Recent Labs     07/07/25  1821 07/08/25  0214   WBC 10.30*  --    HGB 14.9 13.4   HCT 41.6 38.2     --    SODIUM 139  --    K 3.6  --      --    CO2 24  --    BUN 13  --    CREATININE 0.72  --    GLUC 87  --    MG 2.2  --      Imaging Results Review: I personally reviewed the following image studies in PACS and associated radiology reports:  US right upper quadrant    (Results Pending)   MRI Inpatient Order    (Results Pending)       Other Study Results Review:   used EKG was reviewed.     VTE Prophylaxis: Sequential compression device (Venodyne)     Please see attending's attestation for total time spent/billing. Discussed plan of care with patient and primary team: High suspicion for non-epileptic origin to spells, await MRI brain, will attempt to obtain outside hospital EEG report, no clear need to repeat routine EEG, no role for AED, ongoing psych follow up as OP for med management and possible cognitive behavioral therapy.    Please note dictation software was used in the formulation of this note. Please keep that in mind in light of any grammatical errors.

## 2025-07-08 NOTE — QUICK NOTE
Patient seen and examined by the overnight resident.  Preliminary recommendations are as outlined below.  Formal consultation to follow in the morning and patient will be seen by the neurology service.    Overnight Consultation - Neurology   Name: Jay Ortiz 24 y.o. male I MRN: [id number]  Unit/Bed#: ED 09 I Date of Admission: 7/7/2025   Date of Service: 7/8/2025 I Hospital Day: 0   Consults  Physician Requesting Evaluation: Kristine Schwartz DO   Reason for Evaluation / Principal Problem: Syncope versus seizure    Assessment & Plan  Syncope  Jay is a 24-year-old male with a PMHx of ADHD, anxiety, and depression who presents with recurrent episodes of unresponsiveness, eye-rolling, and drooling lasting 30-60 seconds, occurring multiple times daily. These symptoms began on 5/30/2025 following his second thoracic outlet syndrome surgery in April 2025, accompanied by memory lapses and vague cognitive complaints. He was previously on trazodone, Adderall, and sertraline, but discontinued all three, abruptly stopping sertraline, after a prior evaluation at [facility]. An EEG on 5/30/2025 was normal.     Workup  CT head (5/24/2025): No acute intracranial abnormality. Bilateral maxillary sinus air-fluid levels consistent with acute sinusitis.  EEG (5/30/2025): This routine video electroencephalogram capturing wakefulness and drowsiness is normal      Impression: Differential includes nonepileptic spells versus FND versus seizures. The sensory findings on exam are inconsistent with a clear neuroanatomical pattern, further supporting a possible functional etiology. Given recent abrupt SSRI withdrawal and significant psychosocial stressors, a psychogenic or medication-related cause is more likely than an underlying structural or epileptic disorder.     Plan  Case discussed with neurology attending Dr. Woodard  Recommend obtaining routine EEG, if negative may pursue a 72 hour ambulatory EEG  Would not start  AEDs at this time  Seizure precautions  Rest of care per primary team      Recommendations for outpatient neurological follow up have yet to be determined.    History of Present Illness   Jay Ortiz is a 24 y.o. male with PMHx of ADHD, anxiety, and depression who presents with recurrent episodes of unresponsiveness. He was recently diagnosed with thoracic outlet syndrome and underwent bilateral first rib resections on 12/13/2024 and 4/4/2025. He reports that he initially felt well after the first procedure but began to feel “off” following the second surgery on 4/4/2025. Since then, he has noted memory lapses spanning several days and poor recall between December and April, although he is sometimes able to remember events when prompted. He recalls a bloody bowel movement in April followed by a root canal for infection, after which he became more aware of his cognitive difficulties. He had been on trazodone, Adderall, and sertraline, but after presenting to [facility] for recurrent episodes of eye-rolling, drooling, and unresponsiveness lasting 30-60 seconds and occurring multiple times per day, he was advised to stop all three medications. He states he was not aware that sertraline should have been tapered and stopped it abruptly. An EEG performed on 5/30/2025 was reportedly normal, capturing wakefulness and drowsiness without epileptiform activity..     Per chart review, patient presented to [facility] on 5/30/2025 for evaluation of recurrent brief episodes of unresponsiveness with eye-rolling and occasional falls, occurring multiple times daily. He also endorsed vague cognitive complaints and bilateral leg numbness without objective deficits on exam. A prior head CT on 5/25/2025 was unremarkable aside from sinusitis; chest CT was also unremarkable. Neurology assessment suspected a psychogenic etiology over true epileptic or syncopal events. EEG was normal, and brain MRI was  recommended but patient left AMA.    Review of Systems   A 12 point ROS was completed. Other than the above mentioned  complaints, all remaining systems were negative.    Medical History Review: I have reviewed the patient's PMH, PSH, Social History, Family History, Meds, and Allergies     Objective :  Temp:  [97.5 °F (36.4 °C)-98 °F (36.7 °C)] 97.9 °F (36.6 °C)  HR:  [55-81] 67  BP: (106-129)/(57-78) 114/68  Resp:  [14-39] 26  SpO2:  [96 %-99 %] 98 %  O2 Device: None (Room air)    Physical Exam  HENT:      Right Ear: Hearing normal.      Left Ear: Hearing normal.     Eyes:      General: Lids are normal.      Extraocular Movements: Extraocular movements intact.      Pupils: Pupils are equal, round, and reactive to light.       Neurological:      Deep Tendon Reflexes:      Reflex Scores:       Tricep reflexes are 2+ on the right side and 2+ on the left side.       Bicep reflexes are 2+ on the right side and 2+ on the left side.       Brachioradialis reflexes are 2+ on the right side and 2+ on the left side.       Patellar reflexes are 2+ on the right side and 2+ on the left side.       Achilles reflexes are 2+ on the right side and 2+ on the left side.    Psychiatric:         Speech: Speech normal.     Neurological Exam  Mental Status  Awake, alert and oriented to person, place and time. Speech is normal. Language is fluent with no aphasia.    Cranial Nerves  CN II: Visual fields full to confrontation.  CN III, IV, VI: Extraocular movements intact bilaterally. Normal lids and orbits bilaterally. Pupils equal round and reactive to light bilaterally.  CN V:  Right: Abnormal facial sensation: Diminished at R V1 and V3.  Left: Abnormal facial sensation: Diminished at L V3.  CN VII:  Right: There is no facial weakness.  Left: There is no facial weakness.  CN VIII:  Right: Hearing is normal.  Left: Hearing is normal.  CN IX, X: Palate elevates symmetrically  CN XI:  Right: Sternocleidomastoid strength is normal. Trapezius  strength is normal.  Left: Sternocleidomastoid strength is normal. Trapezius strength is normal.  CN XII: Tongue midline without atrophy or fasciculations.    Motor  Normal muscle bulk throughout. No fasciculations present. Normal muscle tone. No abnormal involuntary movements.                                               Right                     Left   Shoulder abduction               5                          5  Elbow flexion                         5                          5  Elbow extension                    5                          5  Hip flexion                              5                          5  Knee flexion                           5                          5  Knee extension                      5                          5  Plantarflexion                         5                          5  Dorsiflexion                            5                          5    Sensory  Light touch abnormality: Diminished at RUE.     Reflexes                                            Right                      Left  Brachioradialis                    2+                         2+  Biceps                                 2+                         2+  Triceps                                2+                         2+  Patellar                                2+                         2+  Achilles                                2+                         2+    Right pathological reflexes: Murray's absent.  Left pathological reflexes: Murray's absent.    Coordination  Right: Finger-to-nose normal.Left: Finger-to-nose normal.    Gait    Deferred.        Lab Results: I have reviewed the following results:CBC:   Results from last 7 days   Lab Units 07/08/25  0214 07/07/25  1821   WBC Thousand/uL  --  10.30*   RBC Million/uL  --  4.63   HEMOGLOBIN g/dL 13.4 14.9   HEMATOCRIT % 38.2 41.6   MCV fL  --  90   PLATELETS Thousands/uL  --  286   , BMP/CMP:   Results from last 7 days   Lab Units 07/07/25  1821    SODIUM mmol/L 139   POTASSIUM mmol/L 3.6   CHLORIDE mmol/L 106   CO2 mmol/L 24   BUN mg/dL 13   CREATININE mg/dL 0.72   CALCIUM mg/dL 9.8   AST U/L 15   ALT U/L 16   ALK PHOS U/L 55   EGFR ml/min/1.73sq m 131     Recent Labs     07/07/25  1821 07/08/25  0214   WBC 10.30*  --    HGB 14.9 13.4   HCT 41.6 38.2     --    SODIUM 139  --    K 3.6  --      --    CO2 24  --    BUN 13  --    CREATININE 0.72  --    GLUC 87  --    MG 2.2  --        Other Study Results Review: Other studies reviewed include: EEG    VTE Prophylaxis: VTE covered by:    None    and Sequential compression device (Venodyne)         John Cruz DO  [facility]  Neurology Residency PGY-III

## 2025-07-08 NOTE — ASSESSMENT & PLAN NOTE
Jay is a 24-year-old male with a PMHx of ADHD, anxiety, and depression who presents with recurrent episodes of unresponsiveness, eye-rolling, and drooling lasting 30-60 seconds, occurring multiple times daily. These symptoms began on 5/30/2025 following his second thoracic outlet syndrome surgery in April 2025, accompanied by memory lapses and vague cognitive complaints. He was previously on trazodone, Adderall, and sertraline, but discontinued all three, abruptly stopping sertraline, after a prior evaluation at [facility]. An EEG on 5/30/2025 was normal.     Workup  CT head (5/24/2025): No acute intracranial abnormality. Bilateral maxillary sinus air-fluid levels consistent with acute sinusitis.  EEG (5/30/2025): This routine video electroencephalogram capturing wakefulness and drowsiness is normal      Impression: Differential includes nonepileptic spells versus FND versus seizures. The sensory findings on exam are inconsistent with a clear neuroanatomical pattern, further supporting a possible functional etiology. Given recent abrupt SSRI withdrawal and significant psychosocial stressors, a psychogenic or medication-related cause is more likely than an underlying structural or epileptic disorder.     Plan  Case discussed with neurology attending Dr. Woodard  Recommend obtaining routine EEG, if negative may pursue a 72 hour ambulatory EEG  Would not start AEDs at this time  Seizure precautions  Rest of care per primary team

## 2025-07-08 NOTE — ED NOTES
Provided with cereal, apple sauce and water. No swallowing difficulty.       Nat Thomas RN  07/08/25 [id number]

## 2025-07-08 NOTE — ASSESSMENT & PLAN NOTE
Jay is a 24-year-old male with a PMHx of ADHD, anxiety, and depression     Presents to [facility] on 7/7 with recurrent episodes of unresponsiveness, eye-rolling, and drooling lasting 30-60 seconds, occurring multiple times daily. These symptoms began on 5/30/2025 following his second thoracic outlet syndrome surgery in April 2025, accompanied by memory lapses and vague cognitive complaints. He was previously on trazodone, Adderall, and sertraline, but discontinued all three, abruptly stopping sertraline, after a prior evaluation at [facility]. An EEG on 5/30/2025 was normal.     Workup  CT head (5/24/2025): No acute intracranial abnormality. Bilateral maxillary sinus air-fluid levels consistent with acute sinusitis.  EEG (5/30/2025): This routine video electroencephalogram capturing wakefulness and drowsiness is normal      Impression: Differential includes nonepileptic spells versus FND versus seizures. The sensory findings on exam are inconsistent with a clear neuroanatomical pattern, further supporting a possible functional etiology. Given recent abrupt SSRI withdrawal and significant psychosocial stressors, a psychogenic or medication-related cause is more likely than an underlying structural or epileptic disorder.     Plan  Case discussed with neurology attending Dr. Woodard  Recommend obtaining routine EEG, if negative may pursue a 72 hour ambulatory EEG  Would not start AEDs at this time  Seizure precautions  Rest of care per primary team

## 2025-07-08 NOTE — ASSESSMENT & PLAN NOTE
24-year-old male with a PMHx of ADHD, anxiety/depression and rib surgery for thoracic outlet syndrome who presents with recurrent episodes of unresponsiveness, eye-rolling, and drooling lasting 30-60 seconds, occurring multiple times daily (beginning several weeks ago). Has also been experiencing brain fog, amnesia/lapses in time/memory.    Seen at outside hospital in late May 2025 for these episodes (normal routine EEG; left AMA before MRI could be completed; was advised to stop his home mediactions (Trazodone, Adderall, and Sertraline).     Episodes have continued; referred to ED yesterday 7/7 by PCP for further workup    Patient/girlfriend note that an episode of interest may have occurred during photic stimulation during the routine EEG in late May at [facility] (tracking down EEG report to clarify); overall do have high suspicion for non-epileptic origin to events.     Neuroimaging/Workup  -CT head (5/24/2025): No acute intracranial abnormality. Bilateral maxillary sinus air-fluid levels consistent with acute sinusitis  -EEG (5/30/2025): This routine video electroencephalogram capturing wakefulness and drowsiness is normal      Plan  - Patient reportedly may have had an event/spell of interest during photic stimulation during his routine EEG at [facility] in late May (patient/girlfriend trying to obtain that EEG report to confirm)....... would be helpful to see if event was truly captured    -While routine EEG report is being tracked down, no clear indication for routine EEG or video EEG monitoring at this juncture   -Agree with possible ambulatory EEG as OP if events persist and the event of interest was NOT captured on the prior routine EEG  -Would not start AEDs at this time given high suspicion for non-epileptic events  -MRI brain/cervical spine ordered by primary team, agree with obtaining (to rule out structural pathology causing above constellation of symptoms)  -Seizure precautions/supportive  care  -Advised continued follow up/management via OP psychiatry/behavioral health (future consideration for cognitive behavioral therapy)   -Defer management of home medications including (Adderall, Trazodone, Sertratline) to psych  -Advised to continue to refrain from driving given ongoing events of LOC/unresponsiveness  -Seizure precautions otherwise:   -No swimming alone, no climbing ladders/heights, no operating dangerous machinery  -Rest of care per primary team

## 2025-07-08 NOTE — ASSESSMENT & PLAN NOTE
24 y.o. male with history of HILDA, MDD, ADHD and thoracic outlet obstruction status post bilateral first rib removals (most recently 4/4) who presented on 7/7 due to an episode of bright red blood per rectum as well as multiple episodes of syncope with feeling of incomplete emptying found to have hemoglobin of 14.9 downtrending to 13.3 with baseline of 12-14 without further bloody bowel movements with CT abdomen only notable for slightly increased stool burden overall concerning for constipation and outlet bleeding likely in the setting of inadequate water intake and laxatives leading to GI consult.    - Agree with regular, gluten-free diet  - Can check TTG IgA, however this will be negative if patient has been compliant with gluten-free diet  - Agree with PPI p.o. daily given GERD symptoms, which could be worsened in the setting of frequent IBgard use  - Start MiraLAX p.o. daily and increase water intake  - Can trial Anusol suppository daily while inpatient  - No current plans for endoscopic evaluation while inpatient given previous colonoscopy  - Complete for outpatient GI follow-up with consideration of anorectal manometry if constipation is refractory to fiber and laxatives

## 2025-07-08 NOTE — CONSULTS
Consultation - Gastroenterology   Name: Jay Ortiz 24 y.o. male I MRN: [id number]  Unit/Bed#: ED 09 I Date of Admission: 7/7/2025   Date of Service: 7/8/2025 I Hospital Day: 0   Inpatient consult to gastroenterology  Consult performed by: Alon Osei MD  Consult ordered by: Kristine Schwartz DO        Physician Requesting Evaluation: Floyd Roman DO   Reason for Evaluation / Principal Problem: GI bleed    Assessment & Plan  GI bleed  24 y.o. male with history of HILDA, MDD, ADHD and thoracic outlet obstruction status post bilateral first rib removals (most recently 4/4) who presented on 7/7 due to an episode of bright red blood per rectum as well as multiple episodes of syncope with feeling of incomplete emptying found to have hemoglobin of 14.9 downtrending to 13.3 with baseline of 12-14 without further bloody bowel movements with CT abdomen only notable for slightly increased stool burden overall concerning for constipation and outlet bleeding likely in the setting of inadequate water intake and laxatives leading to GI consult.    - Agree with regular, gluten-free diet  - Can check TTG IgA, however this will be negative if patient has been compliant with gluten-free diet  - Agree with PPI p.o. daily given GERD symptoms, which could be worsened in the setting of frequent IBgard use  - Start MiraLAX p.o. daily and increase water intake  - Can trial Anusol suppository daily while inpatient  - No current plans for endoscopic evaluation while inpatient given previous colonoscopy  - Complete for outpatient GI follow-up with consideration of anorectal manometry if constipation is refractory to fiber and laxatives  Generalized abdominal pain    - Agree with pantoprazole 40 mg p.o. daily    I have discussed the above management plan in detail with the primary service.     GI will sign off at this time.  Please and not hesitate to reach out for further GI needs. Discussed with [facility] attending.    History  of Present Illness   HPI:  Jay Ortiz is a 24 y.o. male with history of HILDA, MDD, ADHD and thoracic outlet obstruction status post bilateral first rib removals (most recently 4/4) who presented on 7/7 due to an episode of bright red blood per rectum as well as multiple episodes of syncope.  Patient reports he has been having abnormal bowel movements with feeling of incomplete emptying.  Had vomiting yesterday with bright red blood per rectum at which time he feels like he passed out and fell to the ground when his girlfriend found him.  He states he has had unresponsive episodes over the past few weeks.  Patient had been previously on Colace, but stopped after colonoscopy showed melanosis coli.  He has not been taking any laxatives recently.  He states he normally has a bowel movement every 1 to 2 days without straining, but with incomplete emptying.  Denies recent alcohol or tobacco use.  Previous marijuana use.    Review of Systems   Constitutional:  Negative for fever and unexpected weight change.   Gastrointestinal:  Negative for abdominal pain, blood in stool, constipation, diarrhea, nausea, rectal pain and vomiting.   All other systems reviewed and are negative.    Historical Information   Past Medical History[1]  Past Surgical History[2]  Social History[3]  E-Cigarette/Vaping    E-Cigarette Use Never User      E-Cigarette/Vaping Substances    Nicotine No     THC No     CBD No     Flavoring No     Other No     Unknown No      Family history non-contributory    Objective :  Temp:  [97.5 °F (36.4 °C)-97.9 °F (36.6 °C)] 97.9 °F (36.6 °C)  HR:  [55-81] 70  BP: (106-129)/(57-77) 114/58  Resp:  [16-39] 20  SpO2:  [97 %-99 %] 99 %  O2 Device: None (Room air)    Physical Exam  Vitals and nursing note reviewed.   Constitutional:       Appearance: He is well-developed and normal weight.   HENT:      Head: Atraumatic.     Eyes:      Conjunctiva/sclera: Conjunctivae normal.     Abdominal:      General: There is no  distension.      Palpations: Abdomen is soft.      Tenderness: There is no abdominal tenderness. There is no guarding or rebound.     Skin:     General: Skin is warm and dry.      Capillary Refill: Capillary refill takes less than 2 seconds.     Psychiatric:         Mood and Affect: Mood normal.           Lab Results: I have reviewed the following results:none    Imaging Results Review: No pertinent imaging studies reviewed.  Other Study Results Review: No additional pertinent studies reviewed.    Administrative Statements   I have spent a total time of 32 minutes in caring for this patient on the day of the visit/encounter including Diagnostic results, Prognosis, Risks and benefits of tx options, Instructions for management, and Patient and family education.         [1]   Past Medical History:  Diagnosis Date    ADHD     ADHD (attention deficit hyperactivity disorder)     Anxiety     Anxiety     Depression     Depression    [2]   Past Surgical History:  Procedure Laterality Date    ADENOIDECTOMY      [place] [facility] INCL FLUOR GDNCE DX W/CELL WASHG SPX N/A 12/13/2024    Procedure: BRONCHOSCOPY FLEXIBLE;  Surgeon: Dc Gibson DO;  [facility];  Service: Thoracic    RESECTION RIB, THORACOSCOPIC WITH ROBOTICS Left 12/13/2024    Procedure: ROBOTIC LEFT FIRST RIB RESECTION;  Surgeon: Dc Gibson DO;  [facility];  Service: Thoracic    RESECTION RIB, THORACOSCOPIC WITH ROBOTICS Right 4/4/2025    Procedure: RESECTION RIB,THORACOSCOPIC WITH ROBOTICS;  Surgeon: Dc Gibson DO;  [facility];  Service: Thoracic    TONSILLECTOMY      TYMPANOSTOMY TUBE PLACEMENT      removed    WISDOM TOOTH EXTRACTION     [3]   Social History  Tobacco Use    Smoking status: Never    Smokeless tobacco: Never   Vaping Use    Vaping status: Never Used   Substance and Sexual Activity    Alcohol use: Not Currently     Comment: Rarely    Drug use: Yes     Frequency: 7.0 times per week      Types: Marijuana     Comment: Medical Marijuana

## 2025-07-08 NOTE — H&P
H&P - Hospitalist   Name: Jay Ortiz 24 y.o. male I MRN: [id number]  Unit/Bed#: ED 09 I Date of Admission: 7/7/2025   Date of Service: 7/8/2025 I Hospital Day: 0     Assessment & Plan  Syncope  TSH  Tele  Orthostatic VS  Echo  Irritable bowel syndrome with constipation  Taking IB Guard OTC  Anxiety associated with depression  Cont zoloft  Leukocytosis  Elevated bilirubin--RUQ US  CT imaging in May concerning for UTI but U/A negative--repeat U/A    Headache  ? If he is having SPG neuralgia  ALso concerning that optometrist wanted him to see an ophthalmologist, ophthalmology consulted  Neurology consulted    Paresthesias  Obs tele, stroke pathway initiated but felt less likely  Neuro check, A1c, FLP, neurology consult, therapy evals  Echo  MRI brain and c-spine with and without contrast  GI bleed  Hb now and in a.m.  GI consult  Generalized abdominal pain  Unable to tolerate PO x several day as evidenced by ketonuria  RUQ US  GI Consult      VTE Pharmacologic Prophylaxis:   Low Risk (Score 0-2) - Encourage Ambulation.  Code Status: Full Code  Discussion with family: Updated  (significant other) at bedside.    Anticipated Length of Stay: Patient will be admitted on an observation basis with an anticipated length of stay of less than 2 midnights secondary to headache, paresthesias, eye pain,  .    History of Present Illness   Chief Complaint: blacking out, headaches, bloody stools and paresthesias    Jay Ortiz is a 24 y.o. male with a PMH of ADHD, anxiety, depression, s/p bilateral first rib removal for thoracic outlet syndrome who presents with syncopal episodes, headaches, paresthesias, bloody stools.    Patient states that he went to his PCP for a follow up and they suggested he come to the ER. He has having a lot of stomach problems, blood in his stools, passing out--he goes completely unresponsive and his eyes go back and for the super quick and it lasts 30s and he asks where he is and  goes back to normal mental status after a minute. These episodes have been happening for a month. He had an EMG at [facility] May 30th and they did an hour EEG and didn't find any epileptiform discharges and he was having episodes. These episodes have been getting more frequent. They last about a minute at the longest. He is getting a couple of times a day now. They started him on zoloft recently which has decreased the frequency (was 5 times a day or so before starting zoloft). He had surgery to have his rib removed for thoracic outlet syndrome. He hasn't been able to go back to work and that is stressing him out. Was initially due to post op and now due to these episodes. Girlfriend says when these episodes happen she can be yelling at him and no response but when he comes out of it he gasps and then starts coming around. Went to [facility] who told him the nerves behind his eyes were big (?vessels) and they wanted him to go to an ophthmalologist but he hasn't seen one yet (this was after the visit at the hospital May 30th).    Review of Systems   Constitutional:  Positive for appetite change and fatigue. Negative for chills, diaphoresis and fever.   HENT:  Positive for rhinorrhea, sinus pressure and trouble swallowing. Negative for congestion, ear pain, sore throat and voice change.         Feels like stuff is getting stuck when he goes down   Eyes:  Positive for photophobia, pain and visual disturbance.   Respiratory:  Negative for apnea, cough, shortness of breath and wheezing.    Cardiovascular:  Positive for chest pain and palpitations. Negative for leg swelling.        Had post op chest pain after first rib resection but it is getting better   Gastrointestinal:  Positive for abdominal pain, blood in stool, constipation and nausea. Negative for diarrhea and vomiting.   Endocrine: Positive for cold intolerance and heat intolerance. Negative for polydipsia, polyphagia and polyuria.   Genitourinary:   Positive for dysuria and urgency. Negative for frequency.   Musculoskeletal:  Positive for arthralgias, back pain and myalgias.        Chronic   Skin:  Positive for rash.        Had a tick bite that left a rash, on doxycycline   Allergic/Immunologic: Positive for environmental allergies and food allergies.   Neurological:  Positive for dizziness, syncope, facial asymmetry, speech difficulty, light-headedness, numbness and headaches. Negative for tremors and weakness.        Right eyelid is raised higher than left  Right side numbness and tingling in arms and legs, random and moves around and happens on left side too at times   Hematological:  Positive for adenopathy. Bruises/bleeds easily.        Under jaw   Psychiatric/Behavioral:  Positive for confusion, dysphoric mood and sleep disturbance. Negative for suicidal ideas. The patient is nervous/anxious.        Historical Information   Past Medical History[1]  Past Surgical History[2]  Social History[3]  E-Cigarette/Vaping    E-Cigarette Use Never User      E-Cigarette/Vaping Substances    Nicotine No     THC No     CBD No     Flavoring No     Other No     Unknown No      Family History[4] Adopted  Social History:  Marital Status: Single   Occupation: Works at a [employer] store  Patient Pre-hospital Living Situation: Home with parents  Patient Pre-hospital Level of Mobility: walks  Patient Pre-hospital Diet Restrictions: gluten free    Meds/Allergies   I have reviewed home medications with patient family member.  Prior to Admission medications    Medication Sig Start Date End Date Taking? Authorizing Provider   Diclofenac Sodium (VOLTAREN) 1 % Apply 2 g topically in the morning and 2 g before bedtime. 6/23/25   Low Zaidi,    doxycycline hyclate (VIBRA-TABS) 100 mg tablet Take 100 mg by mouth 2 (two) times a day x 10 days, # 20 tab, 0 refill(s) 7/2/25 7/12/25  Historical Provider, MD   sertraline (ZOLOFT) 25 mg tablet Take 25 mg by mouth daily     Historical Provider, MD   IB Guard    Allergies   Allergen Reactions    Actical GI Intolerance    Gluten Meal - Food Allergy Other (See Comments) and GI Intolerance     Per pt my body has trouble digesting it       Objective :  Temp:  [97.5 °F (36.4 °C)-98 °F (36.7 °C)] 97.9 °F (36.6 °C)  HR:  [62-81] 62  BP: (106-129)/(59-78) 106/59  Resp:  [14-39] 18  SpO2:  [96 %-99 %] 97 %  O2 Device: None (Room air)    Physical Exam  Constitutional:       Comments: Lying with blanket over his head and sleeping on arrival, girlfriend hands him sunglasses. He has episodes of not answering well and then he has a period where he is trying to explain everything and he is tearful.   HENT:      Head: Normocephalic and atraumatic.      Mouth/Throat:      Mouth: Mucous membranes are moist.      Pharynx: Oropharynx is clear. No oropharyngeal exudate or posterior oropharyngeal erythema.     Eyes:      General: No scleral icterus.     Extraocular Movements: Extraocular movements intact.      Conjunctiva/sclera: Conjunctivae normal.      Pupils: Pupils are equal, round, and reactive to light.     Neck:      Vascular: No carotid bruit.     Cardiovascular:      Rate and Rhythm: Normal rate and regular rhythm.      Pulses: Normal pulses.      Heart sounds: Normal heart sounds. No murmur heard.     No friction rub. No gallop.   Pulmonary:      Effort: Pulmonary effort is normal. No respiratory distress.      Breath sounds: Normal breath sounds. No wheezing, rhonchi or rales.   Abdominal:      General: Abdomen is flat. There is no distension.      Palpations: Abdomen is soft.      Tenderness: There is no abdominal tenderness. There is no guarding or rebound.      Comments: Tender with hand palpation, not reproducible with auscultation     Musculoskeletal:         General: No tenderness.      Cervical back: Neck supple. No tenderness.      Right lower leg: No edema.      Left lower leg: No edema.   Lymphadenopathy:      Cervical: No cervical  adenopathy.     Skin:     General: Skin is warm and dry.      Coloration: Skin is not jaundiced.      Findings: No bruising or erythema.     Neurological:      General: No focal deficit present.      Mental Status: He is alert and oriented to person, place, and time.      Cranial Nerves: No cranial nerve deficit.      Sensory: Sensory deficit present.      Motor: No weakness.              Lab Results: I have reviewed the following results:  Results from last 7 days   Lab Units 07/07/25  1821   WBC Thousand/uL 10.30*   HEMOGLOBIN g/dL 14.9   HEMATOCRIT % 41.6   PLATELETS Thousands/uL 286   SEGS PCT % 77*   LYMPHO PCT % 16   MONO PCT % 6   EOS PCT % 1     Results from last 7 days   Lab Units 07/07/25  1821   SODIUM mmol/L 139   POTASSIUM mmol/L 3.6   CHLORIDE mmol/L 106   CO2 mmol/L 24   BUN mg/dL 13   CREATININE mg/dL 0.72   ANION GAP mmol/L 9   CALCIUM mg/dL 9.8   ALBUMIN g/dL 4.8   TOTAL BILIRUBIN mg/dL 2.16*   ALK PHOS U/L 55   ALT U/L 16   AST U/L 15   GLUCOSE RANDOM mg/dL 87             Lab Results   Component Value Date    HGBA1C 4.6 08/15/2024           Imaging Results Review: I reviewed radiology reports from this admission including: CT head.  CT Head, chest, abd, pelvis done on 5/24/25:  CT BRAIN - WITHOUT CONTRAST     INDICATION:   progressively worsening cognitive dysfunction, incontinence, difficulty ambulating, chest pain, epigastric abd pain with vomiting.     COMPARISON: CT brain from 6/28/2020     TECHNIQUE:  CT examination of the brain was performed.  Multiplanar 2D reformatted images were created from the source data.     Radiation dose length product (DLP) for this visit:  937 mGy-cm. .  This examination, like all CT scans performed in the [facility], was performed utilizing techniques to minimize radiation dose exposure, including the use of iterative   reconstruction and automated exposure control.     IMAGE QUALITY:  Diagnostic.     FINDINGS:     PARENCHYMA:No intracranial  mass, mass effect or midline shift. No CT signs of acute infarction.  No acute parenchymal hemorrhage.     VENTRICLES AND EXTRA-AXIAL SPACES:  Normal for the patient's age.     VISUALIZED ORBITS:  Normal visualized orbits.     PARANASAL SINUSES:  Bilateral maxillary sinus air-fluid levels consistent with acute sinusitis.     CALVARIUM AND EXTRACRANIAL SOFT TISSUES:  Normal.     IMPRESSION:     No acute intracranial abnormality.     Bilateral maxillary sinus air-fluid levels consistent with acute sinusitis.       CT CHEST, ABDOMEN AND PELVIS WITH IV CONTRAST     INDICATION: progressively worsening cognitive dysfunction, incontinence, difficulty ambulating, chest pain, epigastric abd pain with vomiting.     COMPARISON: CT scan chest/abdomen/pelvis from 6/28/2020.     TECHNIQUE: CT examination of the chest, abdomen and pelvis was performed. Multiplanar 2D reformatted images were created from the source data.     This examination, like all CT scans performed in the [facility], was performed utilizing techniques to minimize radiation dose exposure, including the use of iterative reconstruction and automated exposure control. Radiation dose length   product (DLP) for this visit: 410 mGy-cm.     IV Contrast: 100 mL of iohexol (OMNIPAQUE)  Enteric Contrast: Not administered.     FINDINGS:     CHEST     LUNGS: Lungs are clear. No tracheal or endobronchial lesion.     PLEURA: Unremarkable.     HEART/GREAT VESSELS: Heart is unremarkable for patient's age. No thoracic aortic aneurysm.     MEDIASTINUM AND CHRIS: Unremarkable.     CHEST WALL AND LOWER NECK: Unremarkable.     ABDOMEN     LIVER/BILIARY TREE: Unremarkable.     GALLBLADDER: No calcified gallstones. No pericholecystic inflammatory change.     SPLEEN: Unremarkable.     PANCREAS: Unremarkable.     ADRENAL GLANDS: Unremarkable.     KIDNEYS/URETERS: Unremarkable. No hydronephrosis.     STOMACH AND BOWEL: Unremarkable.     APPENDIX: Normal.      ABDOMINOPELVIC CAVITY: No ascites. No pneumoperitoneum. No lymphadenopathy.     VESSELS: Unremarkable for patient's age.     PELVIS     REPRODUCTIVE ORGANS: Unremarkable for patient's age.     URINARY BLADDER: Mild bladder wall thickening.     ABDOMINAL WALL/INGUINAL REGIONS: Unremarkable.     BONES: No acute fracture or suspicious osseous lesion.     IMPRESSION:     Clear lungs.     Mild bladder wall thickening. Correlate for possible cystitis.            Other Study Results Review: EKG was reviewed.  NSR with no acute ST-T changes    Administrative Statements   I have spent a total time of 58 minutes in caring for this patient on the day of the visit/encounter including Diagnostic results, Prognosis, Risks and benefits of tx options, Instructions for management, Patient and family education, Importance of tx compliance, Risk factor reductions, Impressions, Counseling / Coordination of care, Documenting in the medical record, Reviewing/placing orders in the medical record (including tests, medications, and/or procedures), Obtaining or reviewing history  , and Communicating with other healthcare professionals .    ** Please Note: This note has been constructed using a voice recognition system. **         [1]   Past Medical History:  Diagnosis Date    ADHD     ADHD (attention deficit hyperactivity disorder)     Anxiety     Anxiety     Depression     Depression    [2]   Past Surgical History:  Procedure Laterality Date    ADENOIDECTOMY      [place] [facility] INCL FLUOR GDNCE DX W/CELL WASHG SPX N/A 12/13/2024    Procedure: BRONCHOSCOPY FLEXIBLE;  Surgeon: Dc Gibson DO;  [facility];  Service: Thoracic    RESECTION RIB, THORACOSCOPIC WITH ROBOTICS Left 12/13/2024    Procedure: ROBOTIC LEFT FIRST RIB RESECTION;  Surgeon: Dc Gibson DO;  [facility];  Service: Thoracic    RESECTION RIB, THORACOSCOPIC WITH ROBOTICS Right 4/4/2025    Procedure: RESECTION RIB,THORACOSCOPIC WITH  ROBOTICS;  Surgeon: Dc Gibson DO;  [facility];  Service: Thoracic    TONSILLECTOMY      TYMPANOSTOMY TUBE PLACEMENT      removed    WISDOM TOOTH EXTRACTION     [3]   Social History  Tobacco Use    Smoking status: Never    Smokeless tobacco: Never   Vaping Use    Vaping status: Never Used   Substance and Sexual Activity    Alcohol use: Not Currently     Comment: Rarely    Drug use: Yes     Frequency: 7.0 times per week     Types: Marijuana     Comment: Medical Marijuana   [4]   Family History  Problem Relation Name Age of Onset    No Known Problems Mother      Diabetes Father

## 2025-07-08 NOTE — SPEECH THERAPY NOTE
Dysphagia consult received. Per PA note, patient c/o food sticking when swallowing. He was cleared for a regular diet and has been eating all meals in the ED well. SLP s/w patient and mom in room. He denied any concerns at this time. Formal evaluation does not appear warranted. Please re-consult with concerns.

## 2025-07-09 ENCOUNTER — APPOINTMENT (OUTPATIENT)
Dept: RADIOLOGY | Facility: HOSPITAL | Age: 25
End: 2025-07-09
Payer: COMMERCIAL

## 2025-07-09 ENCOUNTER — TELEPHONE (OUTPATIENT)
Age: 25
End: 2025-07-09

## 2025-07-09 VITALS
OXYGEN SATURATION: 97 % | HEART RATE: 58 BPM | HEIGHT: 66 IN | DIASTOLIC BLOOD PRESSURE: 65 MMHG | TEMPERATURE: 98.4 F | WEIGHT: 142.2 LBS | RESPIRATION RATE: 18 BRPM | SYSTOLIC BLOOD PRESSURE: 105 MMHG | BODY MASS INDEX: 22.85 KG/M2

## 2025-07-09 LAB
ALBUMIN SERPL BCG-MCNC: 4.4 G/DL (ref 3.5–5)
ALP SERPL-CCNC: 55 U/L (ref 34–104)
ALT SERPL W P-5'-P-CCNC: 12 U/L (ref 7–52)
ANION GAP SERPL CALCULATED.3IONS-SCNC: 7 MMOL/L (ref 4–13)
AST SERPL W P-5'-P-CCNC: 14 U/L (ref 13–39)
BILIRUB DIRECT SERPL-MCNC: 0.29 MG/DL (ref 0–0.2)
BILIRUB SERPL-MCNC: 2.6 MG/DL (ref 0.2–1)
BUN SERPL-MCNC: 17 MG/DL (ref 5–25)
CALCIUM SERPL-MCNC: 9.6 MG/DL (ref 8.4–10.2)
CHLORIDE SERPL-SCNC: 106 MMOL/L (ref 96–108)
CO2 SERPL-SCNC: 25 MMOL/L (ref 21–32)
CREAT SERPL-MCNC: 0.78 MG/DL (ref 0.6–1.3)
GFR SERPL CREATININE-BSD FRML MDRD: 127 ML/MIN/1.73SQ M
GLUCOSE SERPL-MCNC: 86 MG/DL (ref 65–140)
MAGNESIUM SERPL-MCNC: 2 MG/DL (ref 1.9–2.7)
POTASSIUM SERPL-SCNC: 4 MMOL/L (ref 3.5–5.3)
PROT SERPL-MCNC: 6.4 G/DL (ref 6.4–8.4)
SODIUM SERPL-SCNC: 138 MMOL/L (ref 135–147)

## 2025-07-09 PROCEDURE — 83735 ASSAY OF MAGNESIUM: CPT | Performed by: STUDENT IN AN ORGANIZED HEALTH CARE EDUCATION/TRAINING PROGRAM

## 2025-07-09 PROCEDURE — 70553 MRI BRAIN STEM W/O & W/DYE: CPT

## 2025-07-09 PROCEDURE — 80076 HEPATIC FUNCTION PANEL: CPT | Performed by: STUDENT IN AN ORGANIZED HEALTH CARE EDUCATION/TRAINING PROGRAM

## 2025-07-09 PROCEDURE — 97167 OT EVAL HIGH COMPLEX 60 MIN: CPT

## 2025-07-09 PROCEDURE — 99239 HOSP IP/OBS DSCHRG MGMT >30: CPT | Performed by: STUDENT IN AN ORGANIZED HEALTH CARE EDUCATION/TRAINING PROGRAM

## 2025-07-09 PROCEDURE — 80048 BASIC METABOLIC PNL TOTAL CA: CPT | Performed by: STUDENT IN AN ORGANIZED HEALTH CARE EDUCATION/TRAINING PROGRAM

## 2025-07-09 PROCEDURE — 72156 MRI NECK SPINE W/O & W/DYE: CPT

## 2025-07-09 PROCEDURE — 97162 PT EVAL MOD COMPLEX 30 MIN: CPT

## 2025-07-09 PROCEDURE — A9585 GADOBUTROL INJECTION: HCPCS | Performed by: STUDENT IN AN ORGANIZED HEALTH CARE EDUCATION/TRAINING PROGRAM

## 2025-07-09 RX ORDER — GADOBUTROL 604.72 MG/ML
6 INJECTION INTRAVENOUS
Status: COMPLETED | OUTPATIENT
Start: 2025-07-09 | End: 2025-07-09

## 2025-07-09 RX ORDER — ACETAMINOPHEN 325 MG/1
650 TABLET ORAL EVERY 4 HOURS PRN
Start: 2025-07-09

## 2025-07-09 RX ORDER — ONDANSETRON 2 MG/ML
4 INJECTION INTRAMUSCULAR; INTRAVENOUS EVERY 4 HOURS PRN
Status: DISCONTINUED | OUTPATIENT
Start: 2025-07-09 | End: 2025-07-09 | Stop reason: HOSPADM

## 2025-07-09 RX ORDER — POLYETHYLENE GLYCOL 3350 17 G/17G
17 POWDER, FOR SOLUTION ORAL DAILY
Qty: 10 EACH | Refills: 0 | Status: SHIPPED | OUTPATIENT
Start: 2025-07-10

## 2025-07-09 RX ORDER — SODIUM CHLORIDE 9 MG/ML
100 INJECTION, SOLUTION INTRAVENOUS CONTINUOUS
Status: DISPENSED | OUTPATIENT
Start: 2025-07-09 | End: 2025-07-09

## 2025-07-09 RX ORDER — PANTOPRAZOLE SODIUM 40 MG/1
40 TABLET, DELAYED RELEASE ORAL
Qty: 30 TABLET | Refills: 0 | Status: SHIPPED | OUTPATIENT
Start: 2025-07-10

## 2025-07-09 RX ADMIN — ONDANSETRON 4 MG: 2 INJECTION INTRAMUSCULAR; INTRAVENOUS at 05:15

## 2025-07-09 RX ADMIN — DOXYCYCLINE 100 MG: 100 CAPSULE ORAL at 08:53

## 2025-07-09 RX ADMIN — HYDROXYZINE HYDROCHLORIDE 25 MG: 25 TABLET, FILM COATED ORAL at 03:22

## 2025-07-09 RX ADMIN — PANTOPRAZOLE SODIUM 40 MG: 40 TABLET, DELAYED RELEASE ORAL at 05:15

## 2025-07-09 RX ADMIN — DICLOFENAC SODIUM 2 G: 10 GEL TOPICAL at 08:53

## 2025-07-09 RX ADMIN — GADOBUTROL 6 ML: 604.72 INJECTION INTRAVENOUS at 02:53

## 2025-07-09 RX ADMIN — SODIUM CHLORIDE 100 ML/HR: 0.9 INJECTION, SOLUTION INTRAVENOUS at 05:15

## 2025-07-09 RX ADMIN — SERTRALINE HYDROCHLORIDE 25 MG: 25 TABLET ORAL at 08:53

## 2025-07-09 RX ADMIN — POLYETHYLENE GLYCOL 3350 17 G: 17 POWDER, FOR SOLUTION ORAL at 08:53

## 2025-07-09 RX ADMIN — HYDROCORTISONE 2.5% 1 APPLICATION: 25 CREAM TOPICAL at 08:53

## 2025-07-09 NOTE — PROGRESS NOTES
Progress Note - Neurology   Name: Jay Ortiz 24 y.o. male I MRN: [id number]  Unit/Bed#: -01 I Date of Admission: 7/7/2025   Date of Service: 7/9/2025 I Hospital Day: 0    Assessment & Plan  Recurrent episodes of unresponsiveness  24 y.o. male with a ADHD, anxiety/depression, and rib surgery for thoracic outlet syndrome who presented to [facility] on 7/7/2025 with recurrent episodes of unresponsiveness, eye-rolling, and drooling lasting 30-60 seconds, occurring multiple times daily (beginning several weeks ago). He has also been experiencing brain fog and amnesia/lapses in time/memory.    His girlfriend described a recent event in which patient was sitting on the toilet when he had rapid eye movements in both directions followed by going unresponsive with eyes rolling back.  Patient did not fall.  No twitching/jerking/convulsive activity.  Episodes last less than a minute.  No tongue bite or incontinence.  Patient has increasing head pressure prior to the episodes.  Heat seems to trigger the events.    Seen at outside hospital in late May 2025 for these episodes (normal routine EEG; left AMA before MRI could be completed; was advised to stop his home mediations (Trazodone, Adderall, and Sertraline).     Episodes have continued; referred to ED on 7/7 by PCP for further workup.    Given normal workup, high suspicion for psychogenic nonepileptic seizures.  Cannot fully rule out syncope/cardiogenic cause.     Neuroimaging/Workup  CT head (5/24/2025): No acute intracranial abnormality. Bilateral maxillary sinus air-fluid levels consistent with acute sinusitis  EEG (5/30/2025): This routine video electroencephalogram capturing wakefulness and drowsiness is normal   MRI brain w/wo contrast (7/9/2025): No acute intracranial abnormalities.  Partially empty sella.  Mild sinus disease.     Plan:  - Patient showed EEG report that was in CareEverywhere (as above).  It does not appear that he had long term monitoring  -  "Recommend outpatient long term cardiac monitoring  - No need to start AEDs at this time given high suspicion for non-epileptic events  - Seizure precautions/supportive care  - Continue to follow-up with outpatient psychiatry/behavioral health (future consideration for cognitive behavioral therapy)  Defer management of home medications to outpatient psychiatry including Adderall, Trazodone, Sertratline  - Advised to refrain from driving given ongoing events of LOC/unresponsiveness  - Seizure precautions (no swimming alone, no climbing ladders/heights, no operating dangerous machinery)  - Rest of care per primary team  - No further inpatient Neuro recommendations    Jay Ortiz will need follow-up in 6 weeks with epilepsy team for Other in 60 minute appointment. He will not require outpatient workup.  Message sent to the schedulers.      Subjective   Patient seen and examined with attending neurologist.  Patient was quite concerned regarding the \"empty sella\" noted on MRI brain.  Reassured the patient that this can be a normal finding.  Patient does not meet the criteria for IIH.  Patient showed EEG report from [place] in May, however it appears to be the same report that is currently in care everywhere.    Patient appears anxious throughout the exam and frequently states that he has \"bad reactions to medications for mood.\"  Advised the patient to continue following with psychiatry.      Review of Systems   Neurological:  Positive for headaches.   Psychiatric/Behavioral:  Positive for dysphoric mood.    All other systems reviewed and are negative.      Objective :  Temp:  [97.8 °F (36.6 °C)-98.4 °F (36.9 °C)] 98.4 °F (36.9 °C)  HR:  [49-78] 58  BP: (103-133)/(54-84) 105/65  Resp:  [16-19] 18  SpO2:  [96 %-98 %] 97 %  O2 Device: None (Room air)    **Patient was examined by attending neurologist.  This provider observed and documented the exam.**      Physical Exam  Vitals and nursing note reviewed. " "  Constitutional:       Appearance: Normal appearance.      Comments: Patient lying comfortably in bed in no acute distress.  Appears slightly withdrawn.  Patient has difficulty looking at examiner in the eyes, and frequently rubs his right eye   HENT:      Head: Normocephalic and atraumatic.      Mouth/Throat:      Mouth: Mucous membranes are moist.      Pharynx: Oropharynx is clear.     Eyes:      Extraocular Movements: Extraocular movements intact.      Conjunctiva/sclera: Conjunctivae normal.     Pulmonary:      Effort: Pulmonary effort is normal.     Musculoskeletal:         General: Normal range of motion.     Skin:     General: Skin is warm and dry.     Neurological:      Mental Status: He is alert.     Neurological Exam  Mental Status  Alert.  Patient awake and alert.  Oriented to person, place, and time  No dysarthria or aphasia.  Able to follow simple midline and appendicular commands  Appears withdrawn throughout the exam.    Cranial Nerves  CN III, IV, VI: Extraocular movements intact bilaterally.    EOMs intact without nystagmus.  No visual field deficits in the left eye.  Patient had difficulty with visual field testing in the right eye (frequently stated \"I can't see\" and then rubbed the right eye.  Patient had difficulty looking at examiner in the eyes)  Facial sensation to light touch intact throughout.  Facial expressions full and symmetric.  Hearing grossly intact..    Motor  Normal muscle bulk throughout. Normal muscle tone. No pronator drift.    Antigravity in all extremities  Movements appear symmetric.    Sensory  Sensation to light touch intact throughout..    Coordination    No ataxia with finger to nose bilaterally.    Gait    Deferred for patient's safety.        Lab Results: I have reviewed the following results:  Imaging Results Review: I reviewed radiology reports from this admission including: MRI brain w/wo contrast.      VTE Pharmacologic Prophylaxis: VTE covered by:    None    and " Sequential compression device (Venodyne)

## 2025-07-09 NOTE — ASSESSMENT & PLAN NOTE
? If he is having SPG neuralgia  Has been recommended for outpatient ophthalmology evaluation and he has not yet followed up he voices understanding that he needs to follow-up

## 2025-07-09 NOTE — UTILIZATION REVIEW
NOTIFICATION OF OBSERVATION ADMISSION   AUTHORIZATION REQUEST   SERVICING FACILITY:   [facility]  Address: [address] [place] [address]  Tax ID: [id number]  NPI: [id number] ATTENDING PROVIDER:  Attending Name and NPI#: Floyd Roman Do [[id number]]  Address: [address] [place] [address]  Phone: [phone]   ADMISSION INFORMATION:  Place of Service: [facility]  Place of Service Code: 22 CPT Code:   Admitting Diagnosis Code/Description:  Syncope [R55]  GI bleed [K92.2]  Generalized headaches [R51.9]  Observation Admission Date/Time: 07/07/2025 2210  Discharge Date/Time: 7/9/2025 12:41 PM  35 observation hours      UTILIZATION REVIEW CONTACT:  Fredis Foster Utilization   Network Utilization Review Department  Phone: [phone]  Fax: [phone]  Email: [email or web address]  Contact for approvals/pending authorizations, clinical reviews, and discharge.     PHYSICIAN ADVISORY SERVICES:  Medical Necessity Denial & Ntes-yr-Ykdk Review  Phone: [phone]  Fax: [phone]  Email: [email or web address]     DISCHARGE SUPPORT TEAM:  For Patients Discharge Needs & Updates  Phone: [phone] opt. 2 Fax: [phone]  Email: [email or web address]

## 2025-07-09 NOTE — UTILIZATION REVIEW
NOTIFICATION OF ADMISSION DISCHARGE   This is a Notification of Discharge from [facility]. Please be advised that this patient has been discharge from our facility. Below you will find the admission and discharge date and time including the patient’s disposition.   UTILIZATION REVIEW CONTACT:  Utilization Review Assistants  Network Utilization Review Department  Phone: [phone] x carefully listen to the prompts. All voicemails are confidential.  Email: [email or web address]     ADMISSION INFORMATION  PRESENTATION DATE: 7/7/2025  4:05 PM  OBERVATION ADMISSION DATE: 07/07/2025 2210  INPATIENT ADMISSION DATE: N/A N/A   DISCHARGE DATE: 7/9/2025 12:41 PM   DISPOSITION:Home/Self Care    Network Utilization Review Department  ATTENTION: Please call with any questions or concerns to [phone] and carefully listen to the prompts so that you are directed to the right person. All voicemails are confidential.   For Discharge needs, contact Care Management DC Support Team at [phone] opt. 2  Send all requests for admission clinical reviews, approved or denied determinations and any other requests to dedicated fax number below belonging to the campus where the patient is receiving treatment. List of dedicated fax numbers for the Facilities:  FACILITY NAME UR FAX NUMBER   ADMISSION DENIALS (Administrative/Medical Necessity) [phone]   DISCHARGE SUPPORT TEAM ([facility]) [phone]   PARENT CHILD HEALTH (Maternity/NICU/Pediatrics) [phone]   [facility] [phone]   [facility] [phone]   [facility] [phone]   [facility] [phone]   [facility] [phone]   [facility] [phone]   [facility] [phone]   [facility] [phone]   [facility]  [facility] [phone]   [facility] [phone]   [facility] [phone]   [facility] [phone]

## 2025-07-09 NOTE — DISCHARGE SUMMARY
Discharge Summary - Hospitalist   Name: Jay Ortiz 24 y.o. male I MRN: [id number]  Unit/Bed#: -01 I Date of Admission: 7/7/2025   Date of Service: 7/9/2025 I Hospital Day: 0     Assessment & Plan  Recurrent episodes of unresponsiveness  Patient reporting multiple bouts of unresponsiveness.  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  TSH 0.941  Tele without significant abnormalities  Orthostatic VS positive, patient has outpatient OT services.  Given orthostatic teaching before discharge  Echo with no abnormality  Irritable bowel syndrome with constipation  Taking IB Guard OTC  Ambulatory GI referral made at discharge  Anxiety associated with depression  Cont zoloft  Leukocytosis  Elevated bilirubin--RUQ US  CT imaging in May concerning for UTI but U/A negative--repeat U/A  Headache  ? If he is having SPG neuralgia  Has been recommended for outpatient ophthalmology evaluation and he has not yet followed up he voices understanding that he needs to follow-up  Paresthesias  Obs tele, stroke pathway initiated but felt less likely  Neuro check, A1c, FLP, neurology consult, therapy evals  MRI brain and c-spine with and without contrast WNL  Outpatient follow-up  GI bleed  Patient reporting occasional blood in stool possibly related to his IBS.  Hemoglobin stable in hospital with no clinical GI bleeding  Ambulatory GI referral at discharge  Generalized abdominal pain  Unable to tolerate PO x several day as evidenced by ketonuria  RUQ US  GI Consult     Medical Problems       Resolved Problems  Date Reviewed: 7/7/2025   None       Discharging Physician / Practitioner: Floyd Roman DO  PCP: Low Zaidi DO  Admission Date:   Admission Orders (From admission, onward)       Ordered        07/07/25 2210  Place in Observation  Once                          Discharge Date: 07/09/25    Next Steps for Physician/AP Assuming Care:  Patient recommended to refrain from driving.  Neurology discussed this with  patient and he reports that he does not currently drive  Recommend outpatient neurology and psychiatry follow-up.  Neuro follow-up is being arranged.  Will make ambulatory referral to gastroenterology as well  Recommend psychiatry follow-up  Drink plenty of water to remain hydrated  Patient given orthostatic teaching  Ambulatory GI follow-up ordered  Patient recommended to follow-up with ophthalmology, he voices understanding of this    Test Results Pending at Discharge (will require follow up):  Celiac Panel pending    Medication Changes for Discharge & Rationale:   Start miralax daily  Start Protonix 40mg po qd  See after visit summary for reconciled discharge medications provided to patient and/or family.     Consultations During Hospital Stay:  Gastroenterology  Neurology    Procedures Performed:   N/A    Significant Findings / Test Results:   Echo LVEF 65%, wall motion is normal, diastolic function is normal (Result paraphrased by myself, see chart for full details)  MRI C-spine 7/9/25-No acute abnormality or abnormal enhancement of cervical spine. Mild reversal of cervical lordosis. Early degenerative disc disease at C7-T1. No significant canal or foraminal stenosis.  MRI brain w/wo contrast 7/9-No acute intracranial abnormality. No focal seizure focus identified. Partial empty sella. Mild sinus disease.  Utox positive for THC and amphetamine/methamphetamine    Incidental Findings:   N/A     Hospital Course:   Jay Ortiz is a 24 y.o. male patient who originally presented to the hospital on 7/7/2025 due to multiple complaints including headache, lightheadedness, fainting, blood in stool among others.  He has had the symptoms for a while but states that they had been increased in frequency recently.  His PCP recommended he come to the ED for further evaluation including MRI.  Urine toxin was positive for THC and amphetamine/methamphetamine.  Patient states he had not taken Adderall or any substances in  "over a month.  Patient was admitted and GI and neurology were consulted.  GI recommended Anusol and MiraLAX and outpatient follow-up with the GI service as there was no inpatient indication for scope as patient had recent colonoscopy that was normal in 9/2024.  Neurology generally suspects PNES and recommended MRI imaging which was negative.  Patient was discharged with recommendations to follow-up with his PCP and outpatient neurology.  He was also made ambulatory referral for ambulatory GI follow-up.  Neurology recommended psychiatric follow-up as well.  Additionally patient has headaches and was told that he may have some ophthalmologic abnormality.  He was advised to follow-up with Optho in the outpatient setting and he voiced understanding of this.     Neurology recommended patient not drive and patient voices understanding of this.  He states he is currently not driving.  Additionally he was given orthostatic teaching at the time of discharge.     Please see above list of diagnoses and related plan for additional information.     Discharge Day Visit / Exam:   Subjective: Resting comfortably in bed, no acute distress  Vitals: Blood Pressure: 105/65 (07/09/25 0810)  Pulse: 58 (07/09/25 0810)  Temperature: 98.4 °F (36.9 °C) (07/09/25 0810)  Temp Source: Oral (07/07/25 2345)  Respirations: 18 (07/09/25 0810)  Height: 5' 6\" (167.6 cm) (07/08/25 1706)  Weight - Scale: 64.5 kg (142 lb 3.2 oz) (07/08/25 1706)  SpO2: 97 % (07/09/25 0810)  Physical Exam  Vitals and nursing note reviewed.   Constitutional:       General: He is not in acute distress.     Appearance: He is well-developed.   HENT:      Head: Normocephalic and atraumatic.      Mouth/Throat:      Mouth: Mucous membranes are moist.     Eyes:      Conjunctiva/sclera: Conjunctivae normal.       Cardiovascular:      Rate and Rhythm: Normal rate and regular rhythm.      Heart sounds: No murmur heard.  Pulmonary:      Effort: Pulmonary effort is normal. No " respiratory distress.      Breath sounds: Normal breath sounds. No wheezing.   Abdominal:      General: There is no distension.      Palpations: Abdomen is soft.      Tenderness: There is no abdominal tenderness. There is no guarding.     Musculoskeletal:         General: No swelling.      Right lower leg: No edema.      Left lower leg: No edema.     Skin:     General: Skin is warm and dry.      Capillary Refill: Capillary refill takes less than 2 seconds.     Neurological:      General: No focal deficit present.      Mental Status: He is alert.     Psychiatric:         Mood and Affect: Mood normal.        Discussion with Family: Patient declined call to .     Discharge instructions/Information to patient and family:   See after visit summary for information provided to patient and family.      Provisions for Follow-Up Care:  See after visit summary for information related to follow-up care and any pertinent home health orders.      Mobility at time of Discharge:   Basic Mobility Inpatient Raw Score: 22  JH-HLM Goal: 7: Walk 25 feet or more  JH-HLM Achieved: 6: Walk 10 steps or more  HLM Goal NOT achieved. Continue to encourage mobility in post discharge setting.     Disposition:   Home    Planned Readmission: N/A    Administrative Statements   Discharge Statement:  I have spent a total time of 45 minutes in caring for this patient on the day of the visit/encounter. >30 minutes of time was spent on: Documenting in the medical record.    **Please Note: This note may have been constructed using a voice recognition system**

## 2025-07-09 NOTE — PLAN OF CARE
Problem: OCCUPATIONAL THERAPY ADULT  Goal: Performs self-care activities at highest level of function for planned discharge setting.  See evaluation for individualized goals.  Description: Treatment Interventions: ADL retraining, Functional transfer training, UE strengthening/ROM, Endurance training, Cognitive reorientation, Patient/family training, Equipment evaluation/education, Fine motor coordination activities, Compensatory technique education, Continued evaluation, Activityengagement, Energy conservation         7/9/2025 1450 by Suzi Morris OT  Outcome: Progressing  Note: Limitation: Decreased ADL status, Decreased UE strength, Decreased cognition, Decreased high-level ADLs, Decreased fine motor control, Decreased sensation, Decreased endurance  Prognosis: Good  Assessment: Pt is a 24 y.o. male seen for OT evaluation s/p admission to [facility] on 7/7/2025 due to  Recurrent episodes of unresponsiveness.  Pt  has a past medical history of ADHD, ADHD (attention deficit hyperactivity disorder), Anxiety, Anxiety, Depression, and Depression. Pt with active OT evaluation/treatment and activity orders. Pt reports living w/ mother in 1SH w/ 1STE. PTA, Pt was I w/ ADL/IADL and functional mobility, was driving and was not using DME at baseline. Pt agreeable and willing to participate in OT evaluation. Pt was greeted supine and left sitting EOB w/ alarm activated and all needs within reach. During evaluation, pt is Supervision ADLs, transfers, functional mobility.  Pt currently presents with impairments in the following categories -difficulty performing ADLS and difficulty performing IADLS  activity tolerance, endurance, standing balance/tolerance, UE strength, FMC, memory, and attention . These impairments, as well as pt's fatigue  limit pt's ability to safely engage in all baseline areas of occupation, includinggrooming, bathing, dressing, toileting, functional mobility/transfers, laundry , driving,  meal prep, cleaning, and work/volunteer work . Pt would benefit from continued acute OT services throughout hospital course in order to maximize Pt's independence and overall occupational performance. Plan for OT interventions 2-3x per week. From OT standpoint,  recommend Level III (Minimum Resource Intensity) upon d/c when pt medically stable to d/c from acute care. Will continue to follow.     Rehab Resource Intensity Level, OT: III (Minimum Resource Intensity) (OP OT)

## 2025-07-09 NOTE — OCCUPATIONAL THERAPY NOTE
"    Occupational Therapy Evaluation     Patient Name: Jay Ortiz  Today's Date: 7/9/2025  Problem List  Principal Problem:    Recurrent episodes of unresponsiveness  Active Problems:    Irritable bowel syndrome with constipation    Anxiety associated with depression    Leukocytosis    Headache    Paresthesias    GI bleed    Generalized abdominal pain    Past Medical History  Past Medical History[1]  Past Surgical History  Past Surgical History[2]        07/09/25 0956   OT Last Visit   OT Visit Date 07/09/25   Note Type   Note type Evaluation   Pain Assessment   Pain Assessment Tool 0-10   Pain Score No Pain   Restrictions/Precautions   Weight Bearing Precautions Per Order No   Other Precautions Cognitive;Fall Risk;Multiple lines   Home Living   Type of Home House   Home Layout One level;Performs ADLs on one level;Able to live on main level with bedroom/bathroom;Stairs to enter with rails  (1STE)   Bathroom Shower/Tub Walk-in shower   Bathroom Toilet Standard   Bathroom Equipment Shower chair   Home Equipment   (no DME)   Additional Comments Pt reports living w/ mother in 1SH w/ 1STE. Pt denies use of AD PTA   Prior Function   Level of Lane Independent with ADLs;Independent with functional mobility;Independent with IADLS   Lives With Family   Receives Help From Family;Friend(s);Outpatient therapy  (OP PT 1x/week)   IADLs Independent with driving;Independent with meal prep;Independent with medication management   Falls in the last 6 months 0   Vocational Full time employment   Comments Pt reports being I w/ ADL and IADL though recently has required increased time to complete due \"memory problems\" and \"having to think harder.\" (+)  at baseline though not currently. Supportive mother.   Lifestyle   Autonomy PTA, Pt reports I w/ ADL, IADL and FM w/out AD though has required increased time due to reported memory problems and difficulty w/ attention. (+)  at baseline though not currently. 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   Intrinsic Gratification Art, [place] [employer]   General   Family/Caregiver Present Yes   Subjective   Subjective \"I just have to think harder to do things and sometimes forget what I'm doing\"   ADL   Where Assessed Edge of bed   Eating Assistance 5  Supervision/Setup   Grooming Assistance 5  Supervision/Setup   UB Bathing Assistance 5  Supervision/Setup   LB Bathing Assistance 5  Supervision/Setup   UB Dressing Assistance 5  Supervision/Setup   LB Dressing Assistance 5  Supervision/Setup   Toileting Assistance  5  Supervision/Setup   Functional Assistance 5  Supervision/Setup   Bed Mobility   Supine to Sit 5  Supervision   Additional items HOB elevated;Increased time required;Verbal cues   Sit to Supine Unable to assess   Additional Comments Pt greeted supine and left seated EOB w/ alarm on and all needs in reach   Transfers   Sit to Stand 5  Supervision   Additional items Increased time required;Verbal cues   Stand to Sit 5  Supervision   Additional items Increased time required;Verbal cues   Additional Comments no AD   Functional Mobility   Functional Mobility 5  Supervision   Additional Comments Pt completes short household distance mobility w/ Supervision, no AD   Balance   Static Sitting Normal   Dynamic Sitting Good   Static Standing Fair +   Dynamic Standing Fair +   Ambulatory Fair   Activity Tolerance   Activity Tolerance Patient tolerated treatment well   Medical Staff Made Aware PT and CM updated   Nurse Made Aware RN cleared   RUE Assessment   RUE Assessment WFL  (Grossly 5/5; AROM)   LUE Assessment   LUE Assessment WFL  (Grossly 5/5; AROM)   Hand Function   Gross Motor Coordination Functional   Fine Motor Coordination Functional   Hand Function Comments L grasp slightly weaker than R grasp; opposition intact. Pt reports difficulty maintaining grasp on utensils.   Sensation   Light Touch Partial deficits in the RUE;Partial deficits in the " LUE;Partial deficits in the RLE;Partial deficits in the LLE  (Pt reports slightly diminshed sensation L side compared to R side + occasional numbness in b/l feet and hands)   Vision-Basic Assessment   Current Vision Does not wear glasses   Vision - Complex Assessment   Ocular Range of Motion Intact   Tracking Intact   Saccades Intact   Acuity Able to read clock/calendar on wall without difficulty;Able to read employee name badge without difficulty   Additional Comments Pt reports eye fatigue and mild dizziness following vision assessment. Resolves w/ rest.   Cognition   Overall Cognitive Status WFL   Arousal/Participation Alert;Cooperative   Attention Attends with cues to redirect   Orientation Level Oriented X4   Memory Within functional limits   Following Commands Follows one step commands without difficulty   Comments Pt is pleasant and cooperative. Pt reports concerns w/ memory and attention. Occasional VC for redirection provided t/o assessment. Recommend formal cognitive assessment.   Assessment   Limitation Decreased ADL status;Decreased UE strength;Decreased cognition;Decreased high-level ADLs;Decreased fine motor control;Decreased sensation;Decreased endurance   Prognosis Good   Assessment Pt is a 24 y.o. male seen for OT evaluation s/p admission to [facility] on 7/7/2025 due to  Recurrent episodes of unresponsiveness.  Pt  has a past medical history of ADHD, ADHD (attention deficit hyperactivity disorder), Anxiety, Anxiety, Depression, and Depression. Pt with active OT evaluation/treatment and activity orders. Pt reports living w/ mother in 1SH w/ 1STE. PTA, Pt was I w/ ADL/IADL and functional mobility, was driving and was not using DME at baseline. Pt agreeable and willing to participate in OT evaluation. Pt was greeted supine and left sitting EOB w/ alarm activated and all needs within reach. During evaluation, pt is Supervision ADLs, transfers, functional mobility.  Pt currently presents with  impairments in the following categories -difficulty performing ADLS and difficulty performing IADLS  activity tolerance, endurance, standing balance/tolerance, UE strength, FMC, memory, and attention . These impairments, as well as pt's fatigue  limit pt's ability to safely engage in all baseline areas of occupation, includinggrooming, bathing, dressing, toileting, functional mobility/transfers, laundry , driving, meal prep, cleaning, and work/volunteer work . Pt would benefit from continued acute OT services throughout hospital course in order to maximize Pt's independence and overall occupational performance. Plan for OT interventions 2-3x per week. From OT standpoint,  recommend Level III (Minimum Resource Intensity) upon d/c when pt medically stable to d/c from acute care. Will continue to follow.   Goals   Patient Goals to go home   LTG Time Frame 10-14   Long Term Goal See goals below   Plan   Treatment Interventions ADL retraining;Functional transfer training;UE strengthening/ROM;Endurance training;Cognitive reorientation;Patient/family training;Equipment evaluation/education;Fine motor coordination activities;Compensatory technique education;Continued evaluation;Activityengagement;Energy conservation   Goal Expiration Date 07/23/25   OT Treatment Day 0   OT Frequency 2-3x/wk   Discharge Recommendation   Rehab Resource Intensity Level, OT III (Minimum Resource Intensity)  (OP OT)   Additional Comments  The patient's raw score on the AM-PAC Daily Activity Inpatient Short Form is 19. A raw score of greater than or equal to 19 suggests the patient may benefit from discharge to home. Please refer to the recommendation of the Occupational Therapist for safe discharge planning.   AM-PAC Daily Activity Inpatient   Lower Body Dressing 3   Bathing 3   Toileting 3   Upper Body Dressing 3   Grooming 3   Eating 4   Daily Activity Raw Score 19   Daily Activity Standardized Score (Calc for Raw Score >=11) 40.22   -PAC  Applied Cognition Inpatient   Following a Speech/Presentation 3   Understanding Ordinary Conversation 4   Taking Medications 4   Remembering Where Things Are Placed or Put Away 4   Remembering List of 4-5 Errands 3   Taking Care of Complicated Tasks 3   Applied Cognition Raw Score 21   Applied Cognition Standardized Score 44.3   End of Consult   Education Provided Yes;Family or social support of family present for education by provider   Patient Position at End of Consult Seated edge of bed;All needs within reach;Bed/Chair alarm activated   Nurse Communication Nurse aware of consult       OT Goals:     - Pt will complete LB ADLs w/ Mod I using appropriate AD/DME as needed to maximize functional independence.    - Pt will complete UB ADLs w/ Mod I using appropriate AD/DME  as needed to maximize functional independence.    - Pt will complete toileting routine (transfers, hygiene, and clothing management) with Mod I  to maximize functional independence.    - Pt will complete bed mobility supine >< sit w/ Mod I using appropriate AD/DME as needed.    - Pt will transfer to bed, chair, and toilet w/ Mod I using appropriate AD/DME as needed.    - Pt will be Mod I with functional mobility for household distances using appropriate AD/DME as needed.     - Pt will increase activity tolerance (and sitting tolerance) by eating all meals OOB in the chair.     - Pt will increase standing tolerance to 15-20 minutes to maximize independence w/ functional standing activities.      - Pt will tolerate therapeutic activities for greater than 30 minutes in order to increase tolerance for functional activities.     - Pt will independently integrate pacing/energy conservation strategies into functional activities.     - Pt will participate in ongoing OT assessment of cognitive skills to assist with safe d/c planning/recommendations.       PAMELA Liu, OTR/L             [1]   Past Medical History:  Diagnosis Date    ADHD     ADHD  (attention deficit hyperactivity disorder)     Anxiety     Anxiety     Depression     Depression    [2]   Past Surgical History:  Procedure Laterality Date    ADENOIDECTOMY      DE BRNCHSC INCL FLUOR GDNCE DX W/CELL WASHG SPX N/A 12/13/2024    Procedure: BRONCHOSCOPY FLEXIBLE;  Surgeon: Dc Gibson DO;  [facility];  Service: Thoracic    RESECTION RIB, THORACOSCOPIC WITH ROBOTICS Left 12/13/2024    Procedure: ROBOTIC LEFT FIRST RIB RESECTION;  Surgeon: Dc Gibson DO;  [facility];  Service: Thoracic    RESECTION RIB, THORACOSCOPIC WITH ROBOTICS Right 4/4/2025    Procedure: RESECTION RIB,THORACOSCOPIC WITH ROBOTICS;  Surgeon: Dc Gibson DO;  [facility];  Service: Thoracic    TONSILLECTOMY      TYMPANOSTOMY TUBE PLACEMENT      removed    WISDOM TOOTH EXTRACTION

## 2025-07-09 NOTE — TELEPHONE ENCOUNTER
STILL ADMITTED:7/7/2025 - present (2 days)  [facility]      1ST ATTEMPT,     VIA [employer]     Thank you,     Niurka TAPIA WITH , 3/11/2024    HFU/ ZORAN CARRIZALES/ Recurrent episodes of unresponsiveness     ----- Message from Lorena Mendoza PA-C sent at 7/9/2025  9:42 AM EDT -----  Regarding: HFU      1.  Jay SMALLWOOD Willdalila will need follow-up in 6 weeks with epilepsy team for Other= ATTENDING  in 60 minute appointment.     2.  He may require outpatient ambulatory EEG, but will ultimately defer to outpatient team.    Thank you!

## 2025-07-09 NOTE — DISCHARGE INSTR - AVS FIRST PAGE
You are recommended to refrain from driving as discussed with the neurology service  Recommend outpatient neurology and psychiatry follow-up.  Neuro follow-up is being arranged.  Will make ambulatory referral to gastroenterology as well  Drink plenty of water to remain hydrated  If you are getting dizzy when standing, take time to let your body adjust and equilibrate after standing up before you move

## 2025-07-09 NOTE — PHYSICAL THERAPY NOTE
Physical Therapy Evaluation     Patient's Name: Jay Ortiz    Admitting Diagnosis  Syncope [R55]  GI bleed [K92.2]  Generalized headaches [R51.9]    Problem List  Problem List[1]    Past Medical History  Past Medical History[2]    Past Surgical History  Past Surgical History[3]       07/09/25 1047   PT Last Visit   PT Visit Date 07/09/25   Note Type   Note type Evaluation   Additional Comments 24 y.o. male admitted to [facility] on 7/7/2025 with Recurrent episodes of unresponsiveness.   Pain Assessment   Pain Assessment Tool 0-10   Pain Score No Pain   Restrictions/Precautions   Weight Bearing Precautions Per Order No   Other Precautions Fall Risk;Multiple lines   Home Living   Type of Home House   Home Layout One level;Performs ADLs on one level;Able to live on main level with bedroom/bathroom  (0 CESILIA)   Bathroom Shower/Tub Walk-in shower   Bathroom Toilet Standard   Bathroom Equipment Shower chair   Bathroom Accessibility Accessible   Home Equipment Other (Comment)  (no DME)   Additional Comments At baseline, pt resides with mother in 1SH with 0 CESILIA and was independent ADLs prior to hospital admission.   Prior Function   Level of [place] Independent with ADLs;Independent with functional mobility;Independent with IADLS   Lives With Family   Receives Help From Family;Friend(s)   IADLs Independent with driving;Independent with meal prep;Independent with medication management   Falls in the last 6 months 0   Vocational Full time employment   General   Additional Pertinent History Patient  has a past medical history of ADHD, ADHD (attention deficit hyperactivity disorder), Anxiety, Anxiety, Depression, and Depression.   Family/Caregiver Present Yes  (mother)   Cognition   Overall Cognitive Status WFL   Arousal/Participation Alert   Orientation Level Oriented X4   Memory Within functional limits   Following Commands Follows all commands and directions without difficulty   Comments patient very  "pleasant and cooperative, good insight of functional deficits and safety awareness, frustrated with hospital course and lack of answers for unresponsive episodes   Subjective   Subjective \"I just want to go home\"   RLE Assessment   RLE Assessment   (4/5 grossly)   LLE Assessment   LLE Assessment   (4/5 grossly)   Bed Mobility   Supine to Sit Unable to assess   Sit to Supine Unable to assess   Additional Comments patient found and left seated at EOB with all needs met   Transfers   Sit to Stand 5  Supervision   Additional items Verbal cues   Stand to Sit 5  Supervision   Additional items Verbal cues   Additional Comments no AD   Ambulation/Elevation   Gait pattern Decreased foot clearance;Short stride;Step through pattern   Gait Assistance 5  Supervision   Additional items Verbal cues   Assistive Device None   Distance 80'x2   Stair Management Assistance Not tested   Ambulation/Elevation Additional Comments Patient ambulated 80'x2 with no AD, requiring occasional verbal cues for navigation, and improving step/stride length. No overt LOB noted throughout ambulation. Patient reported light sensitivity and HA pressure, no dizziness noted throughout.   Balance   Static Sitting Normal   Dynamic Sitting Good   Static Standing Fair +   Dynamic Standing Fair   Ambulatory Fair   Endurance Deficit   Endurance Deficit Yes   Endurance Deficit Description fatigue   Activity Tolerance   Activity Tolerance Patient tolerated treatment well   Nurse Made Aware RN cleared and updated   Assessment   Prognosis Good   Problem List Decreased endurance;Decreased mobility   Assessment PT completed evaluation of 24 y.o. male admitted to [facility] on 7/7/2025 with Recurrent episodes of unresponsiveness. Patient's current status instabilities include multiple lines, ongoing pain, continuous O2/HR monitoring, regression in function from baseline. Patient  has a past medical history of ADHD, ADHD (attention deficit hyperactivity " disorder), Anxiety, Anxiety, Depression, and Depression. At baseline, pt resides with mother in 1SH with 0 CESILIA and was independent ADLs prior to hospital admission.       Patient presents at time of PT evaluation functioning below baseline and currently w/ overall mobility deficits due to: impaired balance, decreased endurance, gait dysfunction, decreased activity tolerance and fall risk. During PT evaluation, patient currently is requiring supervision for bed mobility skills;  supervision for functional transfers and  supervision for ambulation with no AD. Patient ambulated 80'x2 with no AD, requiring occasional verbal cues for navigation, and improving step/stride length. No overt LOB noted throughout ambulation. Patient reported light sensitivity and HA pressure, no dizziness noted throughout. Pt left seated at EOB with all needs met.       This patient is functioning below their baseline and is recommended for level III (continuation of OPPT). Patient will benefit from continued skilled PT this admission to achieve maximal function and safety. The patients AM-PAC Basic Mobility Inpatient Short From Raw Score is 21 . Based on AM-PAC scoring and patient presentation, PT currently recommending Level III (Minimum Resource Intensity). Please also refer to the recommendation of the Physical Therapist for safe discharge planning.   Barriers to Discharge None   Goals   Patient Goals to return home   PT Treatment Day 0   Plan   Treatment/Interventions   (EVAL ONLY)   PT Frequency Other (Comment)  (D/C IPPT)   Discharge Recommendation   Rehab Resource Intensity Level, PT III (Minimum Resource Intensity)  (continuation of OPPT)   AM-PAC Basic Mobility Inpatient   Turning in Flat Bed Without Bedrails 4   Lying on Back to Sitting on Edge of Flat Bed Without Bedrails 4   Moving Bed to Chair 4   Standing Up From Chair Using Arms 3   Walk in Room 3   Climb 3-5 Stairs With Railing 3   Basic Mobility Inpatient Raw Score 21    Basic Mobility Standardized Score 45.55   [facility] Highest Level Of Mobility   -HLM Goal 6: Walk 10 steps or more   -HLM Achieved 7: Walk 25 feet or more   End of Consult   Patient Position at End of Consult Seated edge of bed;All needs within reach         Cindy Mckeon, PT, DPT          [1]   Patient Active Problem List  Diagnosis    Irritable bowel syndrome with constipation    Thoracic outlet syndrome    Elevated bilirubin    Anxiety associated with depression    Chronic bilateral low back pain without sciatica    Spinal stenosis of lumbosacral region    Attention deficit hyperactivity disorder    Recurrent episodes of unresponsiveness    Leukocytosis    Headache    Paresthesias    GI bleed    Generalized abdominal pain   [2]   Past Medical History:  Diagnosis Date    ADHD     ADHD (attention deficit hyperactivity disorder)     Anxiety     Anxiety     Depression     Depression    [3]   Past Surgical History:  Procedure Laterality Date    ADENOIDECTOMY      CT BRNCHSC INCL FLUOR GDNCE DX W/CELL WASHG SPX N/A 12/13/2024    Procedure: BRONCHOSCOPY FLEXIBLE;  Surgeon: Dc Gibson DO;  [facility];  Service: Thoracic    RESECTION RIB, THORACOSCOPIC WITH ROBOTICS Left 12/13/2024    Procedure: ROBOTIC LEFT FIRST RIB RESECTION;  Surgeon: Dc Gibson DO;  [facility];  Service: Thoracic    RESECTION RIB, THORACOSCOPIC WITH ROBOTICS Right 4/4/2025    Procedure: RESECTION RIB,THORACOSCOPIC WITH ROBOTICS;  Surgeon: Dc Gibson DO;  [facility];  Service: Thoracic    TONSILLECTOMY      TYMPANOSTOMY TUBE PLACEMENT      removed    WISDOM TOOTH EXTRACTION

## 2025-07-09 NOTE — ASSESSMENT & PLAN NOTE
Obs tele, stroke pathway initiated but felt less likely  Neuro check, A1c, FLP, neurology consult, therapy evals  MRI brain and c-spine with and without contrast WNL  Outpatient follow-up

## 2025-07-09 NOTE — PLAN OF CARE
Problem: PAIN - ADULT  Goal: Verbalizes/displays adequate comfort level or baseline comfort level  Description: Interventions:  - Encourage patient to monitor pain and request assistance  - Assess pain using appropriate pain scale  - Administer analgesics as ordered based on type and severity of pain and evaluate response  - Implement non-pharmacological measures as appropriate and evaluate response  - Consider cultural and social influences on pain and pain management  - Notify physician/advanced practitioner if interventions unsuccessful or patient reports new pain  - Educate patient/family on pain management process including their role and importance of  reporting pain   - Provide non-pharmacologic/complimentary pain relief interventions  Outcome: Progressing     Problem: INFECTION - ADULT  Goal: Absence or prevention of progression during hospitalization  Description: INTERVENTIONS:  - Assess and monitor for signs and symptoms of infection  - Monitor lab/diagnostic results  - Monitor all insertion sites, i.e. indwelling lines, tubes, and drains  - Monitor endotracheal if appropriate and nasal secretions for changes in amount and color  - [place]ena appropriate cooling/warming therapies per order  - Administer medications as ordered  - Instruct and encourage patient and family to use good hand hygiene technique  - Identify and instruct in appropriate isolation precautions for identified infection/condition  Outcome: Progressing  Goal: Absence of fever/infection during neutropenic period  Description: INTERVENTIONS:  - Monitor WBC  - Perform strict hand hygiene  - Limit to healthy visitors only  - No plants, dried, fresh or silk flowers with de santiago in patient room  Outcome: Progressing     Problem: SAFETY ADULT  Goal: Patient will remain free of falls  Description: INTERVENTIONS:  - Educate patient/family on patient safety including physical limitations  - Instruct patient to call for assistance with activity   -  Consider consulting OT/PT to assist with strengthening/mobility based on AM PAC & JH-HLM score  - Consult OT/PT to assist with strengthening/mobility   - Keep Call bell within reach  - Keep bed low and locked with side rails adjusted as appropriate  - Keep care items and personal belongings within reach  - Initiate and maintain comfort rounds  - Make Fall Risk Sign visible to staff  - Offer Toileting every 2 Hours, in advance of need  - Initiate/Maintain bed alarm  - Obtain necessary fall risk management equipment:   - Apply yellow socks and bracelet for high fall risk patients  - Consider moving patient to room near nurses station  Outcome: Progressing  Goal: Maintain or return to baseline ADL function  Description: INTERVENTIONS:  -  Assess patient's ability to carry out ADLs; assess patient's baseline for ADL function and identify physical deficits which impact ability to perform ADLs (bathing, care of mouth/teeth, toileting, grooming, dressing, etc.)  - Assess/evaluate cause of self-care deficits   - Assess range of motion  - Assess patient's mobility; develop plan if impaired  - Assess patient's need for assistive devices and provide as appropriate  - Encourage maximum independence but intervene and supervise when necessary  - Involve family in performance of ADLs  - Assess for home care needs following discharge   - Consider OT consult to assist with ADL evaluation and planning for discharge  - Provide patient education as appropriate  - Monitor functional capacity and physical performance, use of AM PAC & JH-HLM   - Monitor gait, balance and fatigue with ambulation    Outcome: Progressing  Goal: Maintains/Returns to pre admission functional level  Description: INTERVENTIONS:  - Perform AM-PAC 6 Click Basic Mobility/ Daily Activity assessment daily.  - Set and communicate daily mobility goal to care team and patient/family/caregiver.   - Collaborate with rehabilitation services on mobility goals if  consulted  - Perform Range of Motion 3 times a day.  - Reposition patient every 2 hours.  - Dangle patient 3 times a day  - Stand patient 3 times a day  - Ambulate patient 3 times a day  - Out of bed to chair 3 times a day   - Out of bed for meals 3 times a day  - Out of bed for toileting  - Record patient progress and toleration of activity level   Outcome: Progressing     Problem: DISCHARGE PLANNING  Goal: Discharge to home or other facility with appropriate resources  Description: INTERVENTIONS:  - Identify barriers to discharge w/patient and caregiver  - Arrange for needed discharge resources and transportation as appropriate  - Identify discharge learning needs (meds, wound care, etc.)  - Arrange for interpretive services to assist at discharge as needed  - Refer to Case Management Department for coordinating discharge planning if the patient needs post-hospital services based on physician/advanced practitioner order or complex needs related to functional status, cognitive ability, or social support system  Outcome: Progressing     Problem: Knowledge Deficit  Goal: Patient/family/caregiver demonstrates understanding of disease process, treatment plan, medications, and discharge instructions  Description: Complete learning assessment and assess knowledge base.  Interventions:  - Provide teaching at level of understanding  - Provide teaching via preferred learning methods  Outcome: Progressing

## 2025-07-09 NOTE — ASSESSMENT & PLAN NOTE
Patient reporting occasional blood in stool possibly related to his IBS.  Hemoglobin stable in hospital with no clinical GI bleeding  Ambulatory GI referral at discharge

## 2025-07-09 NOTE — CASE MANAGEMENT
Case Management Assessment & Discharge Planning Note    Patient name Jay Ortiz  Location /-01 MRN [id number]  : 2000 Date 2025       Current Admission Date: 2025  Current Admission Diagnosis:Recurrent episodes of unresponsiveness   Patient Active Problem List    Diagnosis Date Noted    Leukocytosis 2025    Headache 2025    Paresthesias 2025    GI bleed 2025    Generalized abdominal pain 2025    Recurrent episodes of unresponsiveness 2025    Attention deficit hyperactivity disorder 2024    Anxiety associated with depression 2024    Chronic bilateral low back pain without sciatica 2024    Spinal stenosis of lumbosacral region 2024    Elevated bilirubin 2024    Thoracic outlet syndrome 07/15/2024    Irritable bowel syndrome with constipation 2024      LOS (days): 0  Geometric Mean LOS (GMLOS) (days):   Days to GMLOS:     OBJECTIVE:              Current admission status: Observation       Preferred Pharmacy:   [id number] - [place], [place] - [address]  [address] [place] [id number]  Phone: [phone] Fax: [phone]    [id number] - [place], [place] - [address]  [address] [place] [address]  Phone: [phone] Fax: [phone]    Primary Care Provider: Low Zaidi DO    Primary Insurance: BLUE CROSS  Secondary Insurance:     ASSESSMENT:  Active Health Care Proxies       Michelle Ortiz Health Care Representative - Mother, Legal Guardian   Primary Phone: [phone] (Mobile)  Home Phone: [phone]                 Advance Directives  Does patient have a Health Care POA?: No  Does patient have Advance Directives?: No  Primary Contact: Michelle Ortiz (Mother)  Readmission Root Cause  30 Day Readmission: No    Patient Information  Admitted from:: Other (comment) (PCP recommendation)  Mental Status:  Alert  During Assessment patient was accompanied by: Parent  Assessment information provided by:: Patient, Parent  Primary Caregiver: Self  Support Systems: Self, Parent, Spouse/significant other, Family members  County of Residence: [place]  What city do you live in?: [place], [place]  Home entry access options. Select all that apply.: Stairs  Number of steps to enter home.: 1  Do the steps have railings?: Yes  Type of Current Residence: [facility]  Living Arrangements: Lives w/ Parent(s) (lives with Mother)    Activities of Daily Living Prior to Admission  Functional Status: Independent  Completes ADLs independently?: Yes  Ambulates independently?: Yes  Does patient use assisted devices?: No  Does patient currently own DME?: No  Does patient have a history of Outpatient Therapy (PT/OT)?: Yes (current PT for Thoracic Outlet)  Does the patient have a history of Short-Term Rehab?: No  Does patient have a history of HHC?: No  Does patient currently have HHC?: No    Patient Information Continued  Income Source: Employed  Does patient have prescription coverage?: Yes  Can the patient afford their medications and any related supplies (such as glucometers or test strips)?: Yes  Does patient receive dialysis treatments?: No  Does patient have a history of substance abuse?: No  Does patient have a history of Mental Health Diagnosis?: Yes (Depression/Anxiety)  Is patient receiving treatment for mental health?: Yes (Threrapist visits and medication management)  Has patient received inpatient treatment related to mental health in the last 2 years?: No         Means of Transportation  Means of Transport to Appts:: Family transport      Social Determinants of Health (SDOH)      Flowsheet Row Most Recent Value   Housing Stability    In the last 12 months, was there a time when you were not able to pay the mortgage or rent on time? N   In the past 12 months, how many times have you moved where you were living? 0   At any time in the past 12  months, were you homeless or living in a shelter (including now)? N   Transportation Needs    In the past 12 months, has lack of transportation kept you from medical appointments or from getting medications? no   In the past 12 months, has lack of transportation kept you from meetings, work, or from getting things needed for daily living? No   Food Insecurity    Within the past 12 months, you worried that your food would run out before you got the money to buy more. Never true   Within the past 12 months, the food you bought just didn't last and you didn't have money to get more. Never true   Utilities    In the past 12 months has the electric, gas, oil, or water company threatened to shut off services in your home? No            DISCHARGE DETAILS:    Discharge planning discussed with:: patient and motherMichelle at bedside  Freedom of Choice: Yes  Comments - Freedom of Choice: FOC  CM contacted family/caregiver?: No- see comments  Were Treatment Team discharge recommendations reviewed with patient/caregiver?: No  Did patient/caregiver verbalize understanding of patient care needs?: No  Were patient/caregiver advised of the risks associated with not following Treatment Team discharge recommendations?: No- see comments    Contacts  Patient Contacts: Michelle Ortiz  Relationship to Patient:: Family  Contact Method: Phone, In Person  Phone Number: [phone]  Reason/Outcome: Continuity of Care, Emergency Contact, Discharge Planning     CM introduced self and role to patient and motherMichelle at bedside.  Patient lives with his mother in a ranch home with one step to enter.  Independent with amb/adls, employed, not currently driving and conveys that he does have his mother/SO to assist him to his appointments.  He has been non-compliant with f/u appointments at times due to not feeling well.  Currently has OP PT for a second Thoracic Outlet procedure, also a current hx of MI for Depression/Anxiety and has a new  therapist along with medication management.  No hx of SA/STR or HHC.  CM to follow with DCP needs.

## 2025-07-09 NOTE — ASSESSMENT & PLAN NOTE
Patient reporting multiple bouts of unresponsiveness.  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  TSH 0.941  Tele without significant abnormalities  Orthostatic VS positive, patient has outpatient OT services.  Given orthostatic teaching before discharge  Echo with no abnormality

## 2025-07-10 ENCOUNTER — TRANSITIONAL CARE MANAGEMENT (OUTPATIENT)
Dept: FAMILY MEDICINE CLINIC | Facility: CLINIC | Age: 25
End: 2025-07-10

## 2025-07-11 ENCOUNTER — TELEMEDICINE (OUTPATIENT)
Dept: FAMILY MEDICINE CLINIC | Facility: CLINIC | Age: 25
End: 2025-07-11
Payer: COMMERCIAL

## 2025-07-11 VITALS — WEIGHT: 140 LBS | BODY MASS INDEX: 22.6 KG/M2 | TEMPERATURE: 98.3 F

## 2025-07-11 DIAGNOSIS — R20.2 PARESTHESIAS: ICD-10-CM

## 2025-07-11 DIAGNOSIS — R17 ELEVATED BILIRUBIN: ICD-10-CM

## 2025-07-11 DIAGNOSIS — R40.4 RECURRENT EPISODES OF UNRESPONSIVENESS: ICD-10-CM

## 2025-07-11 DIAGNOSIS — K58.1 IRRITABLE BOWEL SYNDROME WITH CONSTIPATION: Primary | ICD-10-CM

## 2025-07-11 DIAGNOSIS — F41.8 ANXIETY ASSOCIATED WITH DEPRESSION: ICD-10-CM

## 2025-07-11 DIAGNOSIS — K04.7 CHRONIC DENTAL INFECTION: ICD-10-CM

## 2025-07-11 DIAGNOSIS — M62.838 MUSCLE SPASMS OF NECK: ICD-10-CM

## 2025-07-11 DIAGNOSIS — M54.50 CHRONIC BILATERAL LOW BACK PAIN WITHOUT SCIATICA: ICD-10-CM

## 2025-07-11 DIAGNOSIS — R10.84 GENERALIZED ABDOMINAL PAIN: ICD-10-CM

## 2025-07-11 DIAGNOSIS — G89.29 CHRONIC BILATERAL LOW BACK PAIN WITHOUT SCIATICA: ICD-10-CM

## 2025-07-11 PROBLEM — K92.2 GI BLEED: Status: RESOLVED | Noted: 2025-07-08 | Resolved: 2025-07-11

## 2025-07-11 PROCEDURE — 99495 TRANSJ CARE MGMT MOD F2F 14D: CPT | Performed by: STUDENT IN AN ORGANIZED HEALTH CARE EDUCATION/TRAINING PROGRAM

## 2025-07-11 RX ORDER — SERTRALINE HYDROCHLORIDE 25 MG/1
12.5 TABLET, FILM COATED ORAL DAILY
Qty: 30 TABLET | Refills: 0 | Status: SHIPPED | OUTPATIENT
Start: 2025-07-11 | End: 2025-07-14

## 2025-07-11 RX ORDER — CEFDINIR 300 MG/1
300 CAPSULE ORAL EVERY 12 HOURS SCHEDULED
Qty: 14 CAPSULE | Refills: 0 | Status: SHIPPED | OUTPATIENT
Start: 2025-07-11 | End: 2025-07-18

## 2025-07-11 RX ORDER — TIZANIDINE 2 MG/1
2 TABLET ORAL
Qty: 20 TABLET | Refills: 0 | Status: SHIPPED | OUTPATIENT
Start: 2025-07-11

## 2025-07-11 NOTE — PROGRESS NOTES
Transition of Care Visit:  Name: Jay Ortiz      : 2000      MRN: [id number]  Encounter Provider: Low Zaidi DO  Encounter Date: 2025   Encounter department: [facility]    Assessment & Plan  Irritable bowel syndrome with constipation  Continue appropriate hydration/nutrition, easy nutrition on the stomach, continue PPI therapy, continue close follow-up with gastroenterology       Anxiety associated with depression  Recommend reducing Zoloft to half tab, 12.5 mg daily       Chronic bilateral low back pain without sciatica  Light stretching/strengthening       Recurrent episodes of unresponsiveness  No recent episodes of unresponsiveness, MRI imaging reviewed       Paresthesias         Elevated bilirubin  Recheck blood work on Monday office visit       Generalized abdominal pain         Chronic dental infection  Switch antibiotic from doxycycline to cefdinir  Orders:    cefdinir (OMNICEF) 300 mg capsule; Take 1 capsule (300 mg total) by mouth every 12 (twelve) hours for 7 days    Muscle spasms of neck  Trial low-dose Zanaflex to help with symptoms consistent with mild muscle spasms  Orders:    tiZANidine (ZANAFLEX) 2 mg tablet; Take 1 tablet (2 mg total) by mouth daily at bedtime         History of Present Illness     Transitional Care Management Review:   Jay Ortiz is a 24 y.o. male here for TCM follow up.     During the TCM phone call patient stated:  TCM Call (since 2025)       Date and time call was made  7/10/2025  6:17 PM    Hospital care reviewed  Records reviewed    Patient was hospitialized at  [facility]    Date of Admission  25    Date of discharge  25    Diagnosis  Syncope    Disposition  Home    Were the patients medications reviewed and updated  Yes    Current Symptoms  None          TCM Call (since 2025)       Post hospital issues  None    Scheduled for follow up?  Yes    Patients specialists  Gastroenterology     Did you obtain your prescribed medications  Yes    Do you need help managing your prescriptions or medications  No    Is transportation to your appointment needed  No    I have advised the patient to call PCP with any new or worsening symptoms  Ryleigh Parminder MA    Living Arrangements  Spouse or Significiant other    Are you recieving home care services  No          TCM Visit.       Review of Systems   Constitutional:  Negative for chills and fever.   HENT:  Negative for ear pain and sore throat.    Eyes:  Negative for pain and visual disturbance.   Respiratory:  Negative for cough and shortness of breath.    Cardiovascular:  Negative for chest pain and palpitations.   Gastrointestinal:  Positive for abdominal pain. Negative for abdominal distention and vomiting.   Genitourinary:  Negative for dysuria and hematuria.   Musculoskeletal:  Negative for arthralgias and back pain.   Skin:  Negative for color change and rash.   Neurological:  Negative for seizures and syncope.   Psychiatric/Behavioral:  Negative for agitation, behavioral problems and confusion.    All other systems reviewed and are negative.    Objective   Temp 98.3 °F (36.8 °C) (Temporal)   Wt 63.5 kg (140 lb)   BMI 22.60 kg/m²     Physical Exam  Constitutional:       General: He is not in acute distress.  Pulmonary:      Effort: Pulmonary effort is normal. No respiratory distress.     Musculoskeletal:         General: No swelling or signs of injury.     Neurological:      General: No focal deficit present.      Mental Status: He is alert and oriented to person, place, and time. Mental status is at baseline.     Psychiatric:         Mood and Affect: Mood normal.         Behavior: Behavior normal.       It was my intent to perform this visit via video technology but the patient was not able to do a video connection so the visit was completed via audio telephone only.     Medications have been reviewed by provider in current encounter

## 2025-07-11 NOTE — ASSESSMENT & PLAN NOTE
Continue appropriate hydration/nutrition, easy nutrition on the stomach, continue PPI therapy, continue close follow-up with gastroenterology

## 2025-07-14 ENCOUNTER — OFFICE VISIT (OUTPATIENT)
Dept: FAMILY MEDICINE CLINIC | Facility: CLINIC | Age: 25
End: 2025-07-14
Payer: COMMERCIAL

## 2025-07-14 VITALS
OXYGEN SATURATION: 99 % | RESPIRATION RATE: 12 BRPM | BODY MASS INDEX: 23.63 KG/M2 | HEIGHT: 66 IN | DIASTOLIC BLOOD PRESSURE: 78 MMHG | WEIGHT: 147 LBS | TEMPERATURE: 98.4 F | HEART RATE: 74 BPM | SYSTOLIC BLOOD PRESSURE: 108 MMHG

## 2025-07-14 DIAGNOSIS — F41.8 ANXIETY ASSOCIATED WITH DEPRESSION: ICD-10-CM

## 2025-07-14 DIAGNOSIS — M54.50 CHRONIC BILATERAL LOW BACK PAIN WITHOUT SCIATICA: ICD-10-CM

## 2025-07-14 DIAGNOSIS — K58.1 IRRITABLE BOWEL SYNDROME WITH CONSTIPATION: Primary | ICD-10-CM

## 2025-07-14 DIAGNOSIS — G89.29 CHRONIC BILATERAL LOW BACK PAIN WITHOUT SCIATICA: ICD-10-CM

## 2025-07-14 DIAGNOSIS — G54.0 THORACIC OUTLET SYNDROME: ICD-10-CM

## 2025-07-14 DIAGNOSIS — R40.4 RECURRENT EPISODES OF UNRESPONSIVENESS: ICD-10-CM

## 2025-07-14 PROBLEM — R51.9 HEADACHE: Status: RESOLVED | Noted: 2025-07-08 | Resolved: 2025-07-14

## 2025-07-14 PROBLEM — D72.829 LEUKOCYTOSIS: Status: RESOLVED | Noted: 2025-07-08 | Resolved: 2025-07-14

## 2025-07-14 PROBLEM — R10.84 GENERALIZED ABDOMINAL PAIN: Status: RESOLVED | Noted: 2025-07-08 | Resolved: 2025-07-14

## 2025-07-14 PROCEDURE — 99214 OFFICE O/P EST MOD 30 MIN: CPT | Performed by: STUDENT IN AN ORGANIZED HEALTH CARE EDUCATION/TRAINING PROGRAM

## 2025-07-14 NOTE — ASSESSMENT & PLAN NOTE
Continue MiraLAX, will increase to twice daily for appropriate bowel movements, constipation symptoms      none

## 2025-07-14 NOTE — PROGRESS NOTES
"Name: Jay Ortiz      : 2000      MRN: [id number]  Encounter Provider: Low Zaidi DO  Encounter Date: 2025   Encounter department: [facility]  Assessment & Plan  Irritable bowel syndrome with constipation  Continue MiraLAX, will increase to twice daily for appropriate bowel movements, constipation symptoms       Anxiety associated with depression  Patient follows with psychiatry, stop Zoloft at this time, patient has been feeling better       Chronic bilateral low back pain without sciatica  Plan to follow-up with appropriate specialist, MRI pending       Recurrent episodes of unresponsiveness  Symptoms have overall improved at this time, continue to monitor       Thoracic outlet syndrome  Check chest x-ray today in office  Orders:  •  XR chest pa and lateral; Future           History of Present Illness   Follow-up in office visit.      Review of Systems   Constitutional:  Positive for fatigue. Negative for chills and fever.   HENT:  Negative for ear pain and sore throat.    Eyes:  Negative for pain and visual disturbance.   Respiratory:  Negative for cough and shortness of breath.    Cardiovascular:  Negative for chest pain and palpitations.   Gastrointestinal:  Negative for abdominal pain and vomiting.   Genitourinary:  Negative for dysuria and hematuria.   Musculoskeletal:  Negative for arthralgias and back pain.   Skin:  Negative for color change and rash.   Neurological:  Negative for seizures and syncope.   All other systems reviewed and are negative.      Objective   /78 (BP Location: Left arm, Patient Position: Sitting, Cuff Size: Standard)   Pulse 74   Temp 98.4 °F (36.9 °C) (Temporal)   Resp 12   Ht 5' 6\" (1.676 m)   Wt 66.7 kg (147 lb)   SpO2 99%   BMI 23.73 kg/m²      Physical Exam  Vitals and nursing note reviewed.   Constitutional:       General: He is not in acute distress.     Appearance: He is well-developed.   HENT:      Head: " Normocephalic and atraumatic.     Eyes:      General: No scleral icterus.     Conjunctiva/sclera: Conjunctivae normal.       Cardiovascular:      Rate and Rhythm: Normal rate and regular rhythm.      Pulses: Normal pulses.      Heart sounds: No murmur heard.  Pulmonary:      Effort: Pulmonary effort is normal. No respiratory distress.      Breath sounds: Normal breath sounds.   Abdominal:      General: There is no distension.      Palpations: Abdomen is soft.      Tenderness: There is no abdominal tenderness.     Musculoskeletal:         General: No swelling. Normal range of motion.      Cervical back: Neck supple.     Skin:     General: Skin is warm and dry.      Capillary Refill: Capillary refill takes less than 2 seconds.     Neurological:      General: No focal deficit present.      Mental Status: He is alert and oriented to person, place, and time. Mental status is at baseline.     Psychiatric:         Mood and Affect: Mood normal.         Behavior: Behavior normal.

## 2025-07-17 LAB — TTG IGA SER IA-ACNC: 0.6 U/ML (ref ?–10)

## 2025-07-23 ENCOUNTER — APPOINTMENT (OUTPATIENT)
Dept: RADIOLOGY | Facility: CLINIC | Age: 25
End: 2025-07-23
Payer: COMMERCIAL

## 2025-07-23 DIAGNOSIS — G54.0 THORACIC OUTLET SYNDROME: ICD-10-CM

## 2025-07-23 PROCEDURE — 71046 X-RAY EXAM CHEST 2 VIEWS: CPT

## 2025-07-24 ENCOUNTER — TELEPHONE (OUTPATIENT)
Dept: NEUROSURGERY | Facility: CLINIC | Age: 25
End: 2025-07-24

## 2025-07-24 NOTE — TELEPHONE ENCOUNTER
7/25/25: ATTEMPTED TO CALL PT, COULDN'T LVM. MAILBOX FULL    7/24/25: NO SHOW FOR TODAY'S APPT    2-4 WK F/U AFTER MRI CSPINE AND BRAIN - DONE 7/9/25 INPATIENT.

## 2025-07-24 NOTE — LETTER
[facility] NEUROSURGICAL [facility]  [address]  [place] [address]  Phone#  [phone]  Fax#  [phone]      July 31, 2025      Dear:   Jay Ortiz         Our office has attempted to contact you several times regarding your missed appointment.  Could you please contact our office at [phone].    Thank you.     Sincerely,    [facility] Neurosurgical [facility]

## 2025-08-04 ENCOUNTER — TELEPHONE (OUTPATIENT)
Age: 25
End: 2025-08-04

## 2025-08-04 ENCOUNTER — OFFICE VISIT (OUTPATIENT)
Dept: CARDIAC SURGERY | Facility: CLINIC | Age: 25
End: 2025-08-04
Payer: COMMERCIAL

## 2025-08-04 VITALS
HEIGHT: 66 IN | DIASTOLIC BLOOD PRESSURE: 72 MMHG | RESPIRATION RATE: 18 BRPM | WEIGHT: 152.56 LBS | OXYGEN SATURATION: 98 % | SYSTOLIC BLOOD PRESSURE: 118 MMHG | TEMPERATURE: 98.2 F | HEART RATE: 72 BPM | BODY MASS INDEX: 24.52 KG/M2

## 2025-08-04 DIAGNOSIS — M62.838 MUSCLE SPASMS OF NECK: ICD-10-CM

## 2025-08-04 DIAGNOSIS — G54.0 THORACIC OUTLET SYNDROME: Primary | ICD-10-CM

## 2025-08-04 PROCEDURE — 99214 OFFICE O/P EST MOD 30 MIN: CPT | Performed by: SURGERY

## 2025-08-04 RX ORDER — HYDROXYZINE HYDROCHLORIDE 10 MG/1
TABLET, FILM COATED ORAL
COMMUNITY
Start: 2025-07-18 | End: 2025-08-11

## 2025-08-05 RX ORDER — TIZANIDINE 2 MG/1
2 TABLET ORAL
Qty: 20 TABLET | Refills: 0 | Status: SHIPPED | OUTPATIENT
Start: 2025-08-05 | End: 2025-08-11

## 2025-08-11 ENCOUNTER — OFFICE VISIT (OUTPATIENT)
Dept: FAMILY MEDICINE CLINIC | Facility: CLINIC | Age: 25
End: 2025-08-11
Payer: COMMERCIAL

## 2025-08-11 ENCOUNTER — TELEPHONE (OUTPATIENT)
Age: 25
End: 2025-08-11

## (undated) DEVICE — MARYLAND BIPOLAR FORCEPS: Brand: ENDOWRIST

## (undated) DEVICE — GAUZE ROLL KITTNER

## (undated) DEVICE — SEAL

## (undated) DEVICE — CLAMP TOWEL TUBING DISPOSABLE

## (undated) DEVICE — SPECIMEN CONTAINER STERILE PEEL PACK

## (undated) DEVICE — TUBING SUCTION 5MM X 12 FT

## (undated) DEVICE — SYRINGE 20ML LL

## (undated) DEVICE — GAUZE SPONGES,16 PLY: Brand: CURITY

## (undated) DEVICE — SUT PROLENE 0 CT-1 30 IN 8424H

## (undated) DEVICE — JP CHANNEL DRAIN, 24FR HUBLESS: Brand: CARDINAL HEALTH

## (undated) DEVICE — SINGLE TUBING WITH LARGE CONNECTOR FOR THORACIC SUCTION SYSTEM PUMP: Brand: THOPAZ TUBING SINGLE

## (undated) DEVICE — FIRST STEP BEDSIDE KIT - STAND-UP POUCH, ENDOSCOPIC CLEANING PAD - 1 POUCH: Brand: FIRST STEP BEDSIDE KIT - STAND-UP POUCH, ENDOSCOPIC CLEANING PAD

## (undated) DEVICE — ELECTRO LUBE IS A SINGLE PATIENT USE DEVICE THAT IS INTENDED TO BE USED ON ELECTROSURGICAL ELECTRODES TO REDUCE STICKING.: Brand: KEY SURGICAL ELECTRO LUBE

## (undated) DEVICE — COLUMN DRAPE

## (undated) DEVICE — EXOFIN PRECISION PEN HIGH VISCOSITY TOPICAL SKIN ADHESIVE: Brand: EXOFIN PRECISION PEN, 1G

## (undated) DEVICE — SINGLE USE SUCTION VALVE MAJ-209: Brand: SINGLE USE SUCTION VALVE (STERILE)

## (undated) DEVICE — CANNULA SEAL

## (undated) DEVICE — Device

## (undated) DEVICE — NEEDLE SPINAL 20G X 3.5 LF

## (undated) DEVICE — ANTIBACTERIAL UNDYED BRAIDED (POLYGLACTIN 910), SYNTHETIC ABSORBABLE SUTURE: Brand: COATED VICRYL

## (undated) DEVICE — SUT MONOCRYL 4-0 PS-2 27 IN Y426H

## (undated) DEVICE — GLOVE SRG BIOGEL ECLIPSE 7.5

## (undated) DEVICE — INTENDED FOR TISSUE SEPARATION, AND OTHER PROCEDURES THAT REQUIRE A SHARP SURGICAL BLADE TO PUNCTURE OR CUT.: Brand: BARD-PARKER SAFETY BLADES SIZE 15, STERILE

## (undated) DEVICE — SINGLE USE BIOPSY VALVE MAJ-210: Brand: SINGLE USE BIOPSY VALVE (STERILE)

## (undated) DEVICE — TUBING SMOKE EVAC W/FILTRATION DEVICE PLUMEPORT ACTIV

## (undated) DEVICE — PROGRASP FORCEPS: Brand: ENDOWRIST

## (undated) DEVICE — NEEDLE HYPO 23G X 1-1/2 IN

## (undated) DEVICE — CANISTER FOR THORACIC SUCTION SYSTEM: Brand: THOPAZ DISPOSABLE CANISTER 0.8L

## (undated) DEVICE — ADHESIVE SKIN CLOSURE SYS EXOFIN FUSION 22CM

## (undated) DEVICE — KIT, BETHLEHEM THORACIC ROBOT: Brand: CARDINAL HEALTH

## (undated) DEVICE — ARM DRAPE

## (undated) DEVICE — PERMANENT CAUTERY HOOK: Brand: ENDOWRIST

## (undated) DEVICE — TROCARS: Brand: KII® OPTICAL ACCESS SYSTEM

## (undated) DEVICE — CADIERE FORCEPS: Brand: ENDOWRIST

## (undated) DEVICE — UMBILICAL TAPE: Brand: DEROYAL

## (undated) DEVICE — OASIS DRAIN, SINGLE, INLINE & ATS COMPATIBLE: Brand: OASIS

## (undated) DEVICE — SURGICEL 4 X 8IN

## (undated) DEVICE — PAD GROUNDING DUAL ADULT

## (undated) DEVICE — PACK,HEAVY DRAINAGE,STERILE: Brand: MEDLINE INDUSTRIES, INC.

## (undated) DEVICE — VISUALIZATION SYSTEM: Brand: CLEARIFY

## (undated) DEVICE — REM POLYHESIVE ADULT PATIENT RETURN ELECTRODE: Brand: VALLEYLAB